# Patient Record
Sex: MALE | Race: WHITE | Employment: OTHER | URBAN - METROPOLITAN AREA
[De-identification: names, ages, dates, MRNs, and addresses within clinical notes are randomized per-mention and may not be internally consistent; named-entity substitution may affect disease eponyms.]

---

## 2017-01-02 RX ORDER — GLIMEPIRIDE 1 MG/1
TABLET ORAL
Qty: 90 TABLET | Refills: 1 | Status: SHIPPED | OUTPATIENT
Start: 2017-01-02 | End: 2017-07-01

## 2017-03-03 RX ORDER — ATORVASTATIN CALCIUM 40 MG/1
TABLET, FILM COATED ORAL
Qty: 30 TABLET | Refills: 0 | Status: SHIPPED | OUTPATIENT
Start: 2017-03-03 | End: 2017-04-06

## 2017-03-03 RX ORDER — LISINOPRIL 5 MG/1
TABLET ORAL
Qty: 30 TABLET | Refills: 0 | Status: SHIPPED | OUTPATIENT
Start: 2017-03-03 | End: 2017-04-06

## 2017-03-08 ENCOUNTER — TELEPHONE (OUTPATIENT)
Dept: INTERNAL MEDICINE CLINIC | Facility: CLINIC | Age: 77
End: 2017-03-08

## 2017-03-08 ENCOUNTER — OFFICE VISIT (OUTPATIENT)
Dept: INTERNAL MEDICINE CLINIC | Facility: CLINIC | Age: 77
End: 2017-03-08

## 2017-03-08 VITALS
OXYGEN SATURATION: 98 % | DIASTOLIC BLOOD PRESSURE: 80 MMHG | SYSTOLIC BLOOD PRESSURE: 124 MMHG | WEIGHT: 191.63 LBS | BODY MASS INDEX: 30.08 KG/M2 | HEART RATE: 100 BPM | TEMPERATURE: 97 F | HEIGHT: 67 IN

## 2017-03-08 DIAGNOSIS — N28.9 RENAL INSUFFICIENCY: ICD-10-CM

## 2017-03-08 DIAGNOSIS — IMO0001 UNCONTROLLED TYPE 2 DIABETES MELLITUS WITHOUT COMPLICATION, WITHOUT LONG-TERM CURRENT USE OF INSULIN: Chronic | ICD-10-CM

## 2017-03-08 DIAGNOSIS — R42 DIZZINESS: Primary | ICD-10-CM

## 2017-03-08 LAB
ALBUMIN SERPL BCP-MCNC: 4.3 G/DL (ref 3.5–4.8)
ALBUMIN/GLOB SERPL: 1.8 {RATIO} (ref 1–2)
ALP SERPL-CCNC: 75 U/L (ref 32–100)
ALT SERPL-CCNC: 40 U/L (ref 17–63)
ANION GAP SERPL CALC-SCNC: 10 MMOL/L (ref 0–18)
AST SERPL-CCNC: 28 U/L (ref 15–41)
BASOPHILS # BLD: 0 K/UL (ref 0–0.2)
BASOPHILS NFR BLD: 1 %
BILIRUB SERPL-MCNC: 0.5 MG/DL (ref 0.3–1.2)
BUN SERPL-MCNC: 21 MG/DL (ref 8–20)
BUN/CREAT SERPL: 15.4 (ref 10–20)
CALCIUM SERPL-MCNC: 9.6 MG/DL (ref 8.5–10.5)
CHLORIDE SERPL-SCNC: 106 MMOL/L (ref 95–110)
CO2 SERPL-SCNC: 26 MMOL/L (ref 22–32)
CREAT SERPL-MCNC: 1.36 MG/DL (ref 0.5–1.5)
EOSINOPHIL # BLD: 0.6 K/UL (ref 0–0.7)
EOSINOPHIL NFR BLD: 8 %
ERYTHROCYTE [DISTWIDTH] IN BLOOD BY AUTOMATED COUNT: 13.3 % (ref 11–15)
GLOBULIN PLAS-MCNC: 2.4 G/DL (ref 2.5–3.7)
GLUCOSE SERPL-MCNC: 141 MG/DL (ref 70–99)
HCT VFR BLD AUTO: 45.9 % (ref 41–52)
HGB BLD-MCNC: 15.2 G/DL (ref 13.5–17.5)
LYMPHOCYTES # BLD: 1.5 K/UL (ref 1–4)
LYMPHOCYTES NFR BLD: 22 %
MCH RBC QN AUTO: 31.6 PG (ref 27–32)
MCHC RBC AUTO-ENTMCNC: 33.2 G/DL (ref 32–37)
MCV RBC AUTO: 95.4 FL (ref 80–100)
MONOCYTES # BLD: 0.5 K/UL (ref 0–1)
MONOCYTES NFR BLD: 8 %
NEUTROPHILS # BLD AUTO: 4.1 K/UL (ref 1.8–7.7)
NEUTROPHILS NFR BLD: 61 %
OSMOLALITY UR CALC.SUM OF ELEC: 299 MOSM/KG (ref 275–295)
PLATELET # BLD AUTO: 295 K/UL (ref 140–400)
PMV BLD AUTO: 7.7 FL (ref 7.4–10.3)
POTASSIUM SERPL-SCNC: 4.6 MMOL/L (ref 3.3–5.1)
PROT SERPL-MCNC: 6.7 G/DL (ref 5.9–8.4)
RBC # BLD AUTO: 4.81 M/UL (ref 4.5–5.9)
SODIUM SERPL-SCNC: 142 MMOL/L (ref 136–144)
TSH SERPL-ACNC: 4.89 UIU/ML (ref 0.34–5.6)
VIT B12 SERPL-MCNC: 491 PG/ML (ref 181–914)
WBC # BLD AUTO: 6.8 K/UL (ref 4–11)

## 2017-03-08 PROCEDURE — 36415 COLL VENOUS BLD VENIPUNCTURE: CPT | Performed by: INTERNAL MEDICINE

## 2017-03-08 PROCEDURE — 99213 OFFICE O/P EST LOW 20 MIN: CPT | Performed by: INTERNAL MEDICINE

## 2017-03-08 PROCEDURE — G0463 HOSPITAL OUTPT CLINIC VISIT: HCPCS | Performed by: INTERNAL MEDICINE

## 2017-03-08 RX ORDER — DOXEPIN HYDROCHLORIDE 50 MG/1
1 CAPSULE ORAL DAILY
Status: ON HOLD | COMMUNITY
End: 2018-10-25

## 2017-03-08 NOTE — PROGRESS NOTES
HPI:    Patient ID: Wolfgang Sullivan is a 77year old male. HPI  Dizziness  Patient has had episodes of orthostatic lightheadedness in the morning. Later in the day he felt better. Now he feels just fine. Once he gets up and going he feels better.    C Comment:Other reaction(s): Unknown  Shellfish               Swelling    Comment:Other reaction(s): SHELLFISH DERIVED  Shellfish Allergy       Unknown    HISTORY:  Past Medical History   Diagnosis Date   • Prostate cancer ([facility] [address] 2001     S/P radical prostate

## 2017-03-08 NOTE — PATIENT INSTRUCTIONS
ASSESSMENT/PLAN:   Diagnoses and all orders for this visit:    Dizziness  Unclear cause for his dizziness.  Recommend lab testing, needs to get up slow in the morning and make sure he is drinking plenty of fluids  diabetes mellitus   Will check his A1C

## 2017-03-08 NOTE — TELEPHONE ENCOUNTER
Called and spoke to patient. Per patient for the past two days he has had a couple of episodes of feeling dizzy when he arises from a laying position into a sitting position.   Per patient, this morning he sat up from his bed to get up for the day and when

## 2017-03-09 LAB — HBA1C MFR BLD: 6.8 % (ref 4–6)

## 2017-03-10 NOTE — PROGRESS NOTES
Quick Note:    CBC Normal (white blood cells and red blood cells and platelets),   CMP Normal (electrolytes, and liver functions), significant decline in kidney function. No motrin, ibuprofen, advil, alleve, naprosyn with these medications.  No motrin, ibupr

## 2017-03-13 ENCOUNTER — OFFICE VISIT (OUTPATIENT)
Dept: INTERNAL MEDICINE CLINIC | Facility: CLINIC | Age: 77
End: 2017-03-13

## 2017-03-13 VITALS
OXYGEN SATURATION: 99 % | BODY MASS INDEX: 29.98 KG/M2 | WEIGHT: 191 LBS | DIASTOLIC BLOOD PRESSURE: 80 MMHG | SYSTOLIC BLOOD PRESSURE: 118 MMHG | TEMPERATURE: 98 F | HEIGHT: 67 IN | HEART RATE: 84 BPM | RESPIRATION RATE: 16 BRPM

## 2017-03-13 DIAGNOSIS — R42 DIZZINESS: ICD-10-CM

## 2017-03-13 DIAGNOSIS — I10 ESSENTIAL HYPERTENSION: Primary | ICD-10-CM

## 2017-03-13 DIAGNOSIS — N28.9 RENAL INSUFFICIENCY: ICD-10-CM

## 2017-03-13 LAB
ANION GAP SERPL CALC-SCNC: 8 MMOL/L (ref 0–18)
BACTERIA UR QL AUTO: NEGATIVE /HPF
BILIRUB UR QL: NEGATIVE
BUN SERPL-MCNC: 15 MG/DL (ref 8–20)
BUN/CREAT SERPL: 12.8 (ref 10–20)
CALCIUM SERPL-MCNC: 9.7 MG/DL (ref 8.5–10.5)
CHLORIDE SERPL-SCNC: 107 MMOL/L (ref 95–110)
CLARITY UR: CLEAR
CO2 SERPL-SCNC: 27 MMOL/L (ref 22–32)
COLOR UR: YELLOW
CREAT SERPL-MCNC: 1.17 MG/DL (ref 0.5–1.5)
ERYTHROCYTE [SEDIMENTATION RATE] IN BLOOD: 5 MM/HR (ref 0–20)
GLUCOSE SERPL-MCNC: 153 MG/DL (ref 70–99)
HGB UR QL STRIP.AUTO: NEGATIVE
HYALINE CASTS #/AREA URNS AUTO: 1 /LPF
LEUKOCYTE ESTERASE UR QL STRIP.AUTO: NEGATIVE
NITRITE UR QL STRIP.AUTO: NEGATIVE
OSMOLALITY UR CALC.SUM OF ELEC: 298 MOSM/KG (ref 275–295)
PH UR: 5 [PH] (ref 5–8)
POTASSIUM SERPL-SCNC: 5 MMOL/L (ref 3.3–5.1)
PROT UR-MCNC: NEGATIVE MG/DL
RBC #/AREA URNS AUTO: 0 /HPF
SODIUM SERPL-SCNC: 142 MMOL/L (ref 136–144)
SP GR UR STRIP: 1.02 (ref 1–1.03)
UROBILINOGEN UR STRIP-ACNC: <2
VIT C UR-MCNC: 40 MG/DL
WBC #/AREA URNS AUTO: <1 /HPF

## 2017-03-13 PROCEDURE — 99212 OFFICE O/P EST SF 10 MIN: CPT | Performed by: INTERNAL MEDICINE

## 2017-03-13 PROCEDURE — G0463 HOSPITAL OUTPT CLINIC VISIT: HCPCS | Performed by: INTERNAL MEDICINE

## 2017-03-13 PROCEDURE — 36415 COLL VENOUS BLD VENIPUNCTURE: CPT | Performed by: INTERNAL MEDICINE

## 2017-03-13 NOTE — PROGRESS NOTES
HPI:    Patient ID: Chikis Haynes is a 77year old male. HPI  Dizziness  Patient's symptoms have fully resolved. He has had no further dizziness. Drinking more fluids and needs his renal function rechecked. Hasn't been taking any NSAIDs.     Hypertamanda CONCENTRATE OR) Take by mouth. Disp:  Rfl:    Glucose Blood (ONETOUCH ULTRA BLUE) In Vitro Strip  Disp:  Rfl:    Vitamin C (VITAMIN C) 500 MG Oral Tab Take 1 tablet by mouth 2 (two) times daily.  1 daily Disp:  Rfl:    aspirin (ASPIR-81) 81 MG Oral Tab EC T

## 2017-03-15 LAB — ANA NUCLEOLAR TITR SER IF: 40 {TITER}

## 2017-03-16 ENCOUNTER — HOSPITAL ENCOUNTER (OUTPATIENT)
Dept: ULTRASOUND IMAGING | Age: 77
Discharge: HOME | End: 2017-03-16
Attending: INTERNAL MEDICINE
Payer: MEDICARE

## 2017-03-16 DIAGNOSIS — N28.9 RENAL INSUFFICIENCY: ICD-10-CM

## 2017-03-16 PROCEDURE — 76770 US EXAM ABDO BACK WALL COMP: CPT

## 2017-04-06 RX ORDER — LISINOPRIL 5 MG/1
TABLET ORAL
Qty: 30 TABLET | Refills: 0 | Status: SHIPPED | OUTPATIENT
Start: 2017-04-06 | End: 2017-05-04

## 2017-04-06 RX ORDER — ATORVASTATIN CALCIUM 40 MG/1
TABLET, FILM COATED ORAL
Qty: 30 TABLET | Refills: 0 | Status: SHIPPED | OUTPATIENT
Start: 2017-04-06 | End: 2017-05-04

## 2017-05-04 RX ORDER — ATORVASTATIN CALCIUM 40 MG/1
TABLET, FILM COATED ORAL
Qty: 30 TABLET | Refills: 3 | Status: SHIPPED | OUTPATIENT
Start: 2017-05-04 | End: 2017-09-08

## 2017-05-04 RX ORDER — LISINOPRIL 5 MG/1
TABLET ORAL
Qty: 30 TABLET | Refills: 3 | Status: SHIPPED | OUTPATIENT
Start: 2017-05-04 | End: 2017-09-01

## 2017-07-04 RX ORDER — GLIMEPIRIDE 1 MG/1
TABLET ORAL
Qty: 90 TABLET | Refills: 0 | Status: SHIPPED | OUTPATIENT
Start: 2017-07-04 | End: 2017-09-29

## 2017-07-13 ENCOUNTER — TELEPHONE (OUTPATIENT)
Dept: INTERNAL MEDICINE CLINIC | Facility: CLINIC | Age: 77
End: 2017-07-13

## 2017-07-13 RX ORDER — MONTELUKAST SODIUM 10 MG/1
10 TABLET ORAL DAILY
Qty: 7 TABLET | Refills: 0 | Status: SHIPPED | OUTPATIENT
Start: 2017-07-13 | End: 2017-07-20

## 2017-07-13 RX ORDER — AMOXICILLIN 500 MG/1
500 CAPSULE ORAL 3 TIMES DAILY
Qty: 30 CAPSULE | Refills: 0 | Status: SHIPPED | OUTPATIENT
Start: 2017-07-13 | End: 2017-07-23

## 2017-07-13 NOTE — TELEPHONE ENCOUNTER
Spoke with wife Leslee Canseco, informed prescription was sent to pharmacy and to make appointment if not feeling better. Wife verbalized understanding.

## 2017-07-13 NOTE — TELEPHONE ENCOUNTER
Try singuilar 10 mg at night. If no better, can he come in after 4 days. Rx. Sent.  See new Rx, sent to pharmacy please notify patientSee new Rx, f/u if not better

## 2017-09-01 RX ORDER — LISINOPRIL 5 MG/1
TABLET ORAL
Qty: 30 TABLET | Refills: 3 | Status: SHIPPED | OUTPATIENT
Start: 2017-09-01 | End: 2017-12-28

## 2017-09-08 RX ORDER — ATORVASTATIN CALCIUM 40 MG/1
TABLET, FILM COATED ORAL
Qty: 30 TABLET | Refills: 0 | Status: SHIPPED | OUTPATIENT
Start: 2017-09-08 | End: 2017-10-13

## 2017-09-29 RX ORDER — GLIMEPIRIDE 1 MG/1
TABLET ORAL
Qty: 90 TABLET | Refills: 0 | Status: SHIPPED | OUTPATIENT
Start: 2017-09-29 | End: 2017-12-28

## 2017-10-04 ENCOUNTER — OFFICE VISIT (OUTPATIENT)
Dept: INTERNAL MEDICINE CLINIC | Facility: CLINIC | Age: 77
End: 2017-10-04

## 2017-10-04 ENCOUNTER — MED REC SCAN ONLY (OUTPATIENT)
Dept: INTERNAL MEDICINE CLINIC | Facility: CLINIC | Age: 77
End: 2017-10-04

## 2017-10-04 VITALS
HEIGHT: 67 IN | DIASTOLIC BLOOD PRESSURE: 84 MMHG | SYSTOLIC BLOOD PRESSURE: 128 MMHG | BODY MASS INDEX: 29.19 KG/M2 | WEIGHT: 186 LBS | TEMPERATURE: 98 F | HEART RATE: 73 BPM

## 2017-10-04 DIAGNOSIS — IMO0001 UNCONTROLLED TYPE 2 DIABETES MELLITUS WITHOUT COMPLICATION, WITHOUT LONG-TERM CURRENT USE OF INSULIN: Primary | Chronic | ICD-10-CM

## 2017-10-04 DIAGNOSIS — Z12.5 SCREENING PSA (PROSTATE SPECIFIC ANTIGEN): ICD-10-CM

## 2017-10-04 DIAGNOSIS — Z85.46 PERSONAL HISTORY OF PROSTATE CANCER: ICD-10-CM

## 2017-10-04 DIAGNOSIS — I10 ESSENTIAL HYPERTENSION: ICD-10-CM

## 2017-10-04 PROCEDURE — 90653 IIV ADJUVANT VACCINE IM: CPT | Performed by: INTERNAL MEDICINE

## 2017-10-04 PROCEDURE — G0008 ADMIN INFLUENZA VIRUS VAC: HCPCS | Performed by: INTERNAL MEDICINE

## 2017-10-04 PROCEDURE — 36415 COLL VENOUS BLD VENIPUNCTURE: CPT | Performed by: INTERNAL MEDICINE

## 2017-10-04 PROCEDURE — 99213 OFFICE O/P EST LOW 20 MIN: CPT | Performed by: INTERNAL MEDICINE

## 2017-10-04 PROCEDURE — G0463 HOSPITAL OUTPT CLINIC VISIT: HCPCS | Performed by: INTERNAL MEDICINE

## 2017-10-04 NOTE — PATIENT INSTRUCTIONS
ASSESSMENT/PLAN:   Diagnoses and all orders for this visit:    Uncontrolled type 2 diabetes mellitus without complication, without long-term current use of insulin ([facility] [address]  Patient overall doing well, to recheck labs  Essential hypertension  BP overall doing

## 2017-10-04 NOTE — PROGRESS NOTES
HPI:    Patient ID: Wolfgang Sullivan is a 77year old male. HPI  Diabetes  Patient here for follow up of Diabetes. Has been taking medications regularly. Currently taking metformin/glimeperide  Checks sugars 1 times daily.   Fasting sugars average 3   multivitamin Oral Tab Take 1 tablet by mouth daily. Disp:  Rfl:    METFORMIN HCL 1000 MG Oral Tab TAKE ONE TABLET BY MOUTH TWICE DAILY Disp: 180 tablet Rfl: 1   Omega-3 Fatty Acids (FISH OIL CONCENTRATE OR) Take by mouth.  Disp:  Rfl:    Glucose Blood (

## 2017-10-13 RX ORDER — ATORVASTATIN CALCIUM 40 MG/1
TABLET, FILM COATED ORAL
Qty: 30 TABLET | Refills: 3 | Status: SHIPPED | OUTPATIENT
Start: 2017-10-13 | End: 2017-10-23

## 2017-10-20 NOTE — TELEPHONE ENCOUNTER
Diabetes Medications  Protocol Criteria:  · Appointment scheduled in the past 6 months or the next 3 months  · A1C < 7.5 in the past 6 months  · Creatinine in the past 12 months  · Creatinine result < 1.5   Recent Outpatient Visits            2 weeks ago U

## 2017-10-23 ENCOUNTER — TELEPHONE (OUTPATIENT)
Dept: INTERNAL MEDICINE CLINIC | Facility: CLINIC | Age: 77
End: 2017-10-23

## 2017-10-23 RX ORDER — ATORVASTATIN CALCIUM 40 MG/1
TABLET, FILM COATED ORAL
Qty: 90 TABLET | Refills: 1 | Status: SHIPPED | OUTPATIENT
Start: 2017-10-23 | End: 2018-10-26

## 2017-10-23 NOTE — TELEPHONE ENCOUNTER
Current Outpatient Prescriptions:     •  ATORVASTATIN 40 MG Oral Tab, TAKE ONE TABLET BY MOUTH ONCE DAILY, Disp: 30 tablet, Rfl: 3    Patient calling states [facility] never received rx from October 13th please send new rx patient is completely out of medi

## 2017-12-28 RX ORDER — GLIMEPIRIDE 1 MG/1
TABLET ORAL
Qty: 90 TABLET | Refills: 0 | Status: SHIPPED | OUTPATIENT
Start: 2017-12-28 | End: 2018-03-24

## 2017-12-28 RX ORDER — LISINOPRIL 5 MG/1
TABLET ORAL
Qty: 90 TABLET | Refills: 0 | Status: SHIPPED | OUTPATIENT
Start: 2017-12-28 | End: 2018-03-19

## 2018-01-12 ENCOUNTER — TELEPHONE (OUTPATIENT)
Dept: INTERNAL MEDICINE CLINIC | Facility: CLINIC | Age: 78
End: 2018-01-12

## 2018-01-19 ENCOUNTER — TELEPHONE (OUTPATIENT)
Dept: INTERNAL MEDICINE CLINIC | Facility: CLINIC | Age: 78
End: 2018-01-19

## 2018-01-19 NOTE — TELEPHONE ENCOUNTER
Patient states his pharmacy will not take the rx send for the One touch meter,lancets and strips .  They need a new rx

## 2018-01-27 ENCOUNTER — HOSPITAL ENCOUNTER (EMERGENCY)
Facility: HOSPITAL | Age: 78
Discharge: HOME | End: 2018-01-27
Attending: EMERGENCY MEDICINE
Payer: MEDICARE

## 2018-01-27 ENCOUNTER — APPOINTMENT (OUTPATIENT)
Dept: GENERAL RADIOLOGY | Facility: HOSPITAL | Age: 78
End: 2018-01-27
Payer: MEDICARE

## 2018-01-27 VITALS
HEART RATE: 77 BPM | OXYGEN SATURATION: 97 % | SYSTOLIC BLOOD PRESSURE: 136 MMHG | TEMPERATURE: 98 F | DIASTOLIC BLOOD PRESSURE: 66 MMHG | RESPIRATION RATE: 18 BRPM

## 2018-01-27 DIAGNOSIS — R09.1 PLEURISY: Primary | ICD-10-CM

## 2018-01-27 LAB
ANION GAP SERPL CALC-SCNC: 8 MMOL/L (ref 0–18)
BASOPHILS # BLD: 0 K/UL (ref 0–0.2)
BASOPHILS NFR BLD: 1 %
BUN SERPL-MCNC: 16 MG/DL (ref 8–20)
BUN/CREAT SERPL: 12.5 (ref 10–20)
CALCIUM SERPL-MCNC: 9.1 MG/DL (ref 8.5–10.5)
CHLORIDE SERPL-SCNC: 104 MMOL/L (ref 95–110)
CO2 SERPL-SCNC: 25 MMOL/L (ref 22–32)
CREAT SERPL-MCNC: 1.28 MG/DL (ref 0.5–1.5)
D DIMER PPP FEU-MCNC: <0.27 MCG/ML (ref ?–0.76)
EOSINOPHIL # BLD: 0.4 K/UL (ref 0–0.7)
EOSINOPHIL NFR BLD: 7 %
ERYTHROCYTE [DISTWIDTH] IN BLOOD BY AUTOMATED COUNT: 13.7 % (ref 11–15)
GLUCOSE SERPL-MCNC: 177 MG/DL (ref 70–99)
HCT VFR BLD AUTO: 43.6 % (ref 41–52)
HGB BLD-MCNC: 14.5 G/DL (ref 13.5–17.5)
LYMPHOCYTES # BLD: 1.3 K/UL (ref 1–4)
LYMPHOCYTES NFR BLD: 23 %
MCH RBC QN AUTO: 31.6 PG (ref 27–32)
MCHC RBC AUTO-ENTMCNC: 33.1 G/DL (ref 32–37)
MCV RBC AUTO: 95.3 FL (ref 80–100)
MONOCYTES # BLD: 0.5 K/UL (ref 0–1)
MONOCYTES NFR BLD: 8 %
NEUTROPHILS # BLD AUTO: 3.4 K/UL (ref 1.8–7.7)
NEUTROPHILS NFR BLD: 62 %
OSMOLALITY UR CALC.SUM OF ELEC: 290 MOSM/KG (ref 275–295)
PLATELET # BLD AUTO: 276 K/UL (ref 140–400)
PMV BLD AUTO: 7.2 FL (ref 7.4–10.3)
POTASSIUM SERPL-SCNC: 4.2 MMOL/L (ref 3.3–5.1)
RBC # BLD AUTO: 4.58 M/UL (ref 4.5–5.9)
SODIUM SERPL-SCNC: 137 MMOL/L (ref 136–144)
TROPONIN I SERPL-MCNC: 0 NG/ML (ref ?–0.03)
TROPONIN I SERPL-MCNC: 0 NG/ML (ref ?–0.03)
WBC # BLD AUTO: 5.6 K/UL (ref 4–11)

## 2018-01-27 PROCEDURE — 80048 BASIC METABOLIC PNL TOTAL CA: CPT

## 2018-01-27 PROCEDURE — 93005 ELECTROCARDIOGRAM TRACING: CPT

## 2018-01-27 PROCEDURE — 71045 X-RAY EXAM CHEST 1 VIEW: CPT

## 2018-01-27 PROCEDURE — 85025 COMPLETE CBC W/AUTO DIFF WBC: CPT | Performed by: EMERGENCY MEDICINE

## 2018-01-27 PROCEDURE — 93010 ELECTROCARDIOGRAM REPORT: CPT | Performed by: EMERGENCY MEDICINE

## 2018-01-27 PROCEDURE — 80048 BASIC METABOLIC PNL TOTAL CA: CPT | Performed by: EMERGENCY MEDICINE

## 2018-01-27 PROCEDURE — 99285 EMERGENCY DEPT VISIT HI MDM: CPT

## 2018-01-27 PROCEDURE — 84484 ASSAY OF TROPONIN QUANT: CPT

## 2018-01-27 PROCEDURE — 84484 ASSAY OF TROPONIN QUANT: CPT | Performed by: EMERGENCY MEDICINE

## 2018-01-27 PROCEDURE — 36415 COLL VENOUS BLD VENIPUNCTURE: CPT

## 2018-01-27 PROCEDURE — 85379 FIBRIN DEGRADATION QUANT: CPT | Performed by: EMERGENCY MEDICINE

## 2018-01-27 PROCEDURE — 85025 COMPLETE CBC W/AUTO DIFF WBC: CPT

## 2018-01-28 NOTE — ED PROVIDER NOTES
Patient Seen in: [facility] AND [facility] Emergency Department    History   Patient presents with:  Chest Pain Angina (cardiovascular)    Stated Complaint: CP    HPI    Patient is a 69-year-old male presents to the emergency department with a chief complaint of 97.7 °F (36.5 °C) (Oral)   Resp 19   SpO2 97%         Physical Exam   Constitutional: He is oriented to person, place, and time. He appears well-developed and well-nourished. HENT:   Head: Normocephalic and atraumatic.    Eyes: Conjunctivae and EOM are no noted on EKG Report. Rate: 62  Rhythm: Sinus Rhythm  Reading: No acute changes.   Rare PVC           ED Course as of Jan 27 1814  ------------------------------------------------------------       MDM   Troponin and d-dimer normal.  Chest x-ray normal.  No

## 2018-03-19 RX ORDER — LISINOPRIL 5 MG/1
TABLET ORAL
Qty: 90 TABLET | Refills: 0 | Status: SHIPPED | OUTPATIENT
Start: 2018-03-19 | End: 2018-06-29

## 2018-03-25 RX ORDER — GLIMEPIRIDE 1 MG/1
TABLET ORAL
Qty: 90 TABLET | Refills: 1 | Status: SHIPPED | OUTPATIENT
Start: 2018-03-25 | End: 2018-08-17

## 2018-04-04 ENCOUNTER — OFFICE VISIT (OUTPATIENT)
Dept: INTERNAL MEDICINE CLINIC | Facility: CLINIC | Age: 78
End: 2018-04-04

## 2018-04-04 VITALS
TEMPERATURE: 98 F | BODY MASS INDEX: 25.76 KG/M2 | WEIGHT: 170 LBS | DIASTOLIC BLOOD PRESSURE: 74 MMHG | SYSTOLIC BLOOD PRESSURE: 107 MMHG | HEART RATE: 73 BPM | HEIGHT: 68 IN | OXYGEN SATURATION: 98 %

## 2018-04-04 DIAGNOSIS — IMO0001 UNCONTROLLED TYPE 2 DIABETES MELLITUS WITHOUT COMPLICATION, WITHOUT LONG-TERM CURRENT USE OF INSULIN: Primary | Chronic | ICD-10-CM

## 2018-04-04 DIAGNOSIS — I10 ESSENTIAL HYPERTENSION: ICD-10-CM

## 2018-04-04 PROCEDURE — 99213 OFFICE O/P EST LOW 20 MIN: CPT | Performed by: INTERNAL MEDICINE

## 2018-04-04 PROCEDURE — 36415 COLL VENOUS BLD VENIPUNCTURE: CPT | Performed by: INTERNAL MEDICINE

## 2018-04-04 RX ORDER — BIOTIN 1 MG
1 TABLET ORAL DAILY
COMMUNITY
End: 2018-09-10

## 2018-04-04 NOTE — PROGRESS NOTES
HPI:    Patient ID: Jerson Shay is a 69year old male. HPI  Diabetes  Patient here for follow up of Diabetes. Has been taking medications regularly. Currently taking metformin/glimeperid  Checks sugars 1 times daily.   Fasting sugars average 9 Blood Glucose Monitoring Suppl ([address]   [address]) w/Device Does not apply Kit Testing twice a day    Diagnosis E11.65 Disp: 1 kit Rfl: 0   ONETOUCH LANCETS Does not apply Misc Testing twice a day   Diagnosis E11.65 Disp: 100 each Rfl: 3   Glucose Bl long-term current use of insulin (HCC)  Sugar overall doing very well, will check labs  Essential hypertension  BP quite good, likely related to his increased walking, keep it up!!           DB#[id number]

## 2018-04-15 NOTE — TELEPHONE ENCOUNTER
Refilled per Epic protocol.     Diabetes Medications  Protocol Criteria:  · Appointment scheduled in the past 6 months or the next 3 months  · A1C < 7.5 in the past 6 months  · Creatinine in the past 12 months  · Creatinine result < 1.5   Recent Outpatient

## 2018-05-14 ENCOUNTER — PATIENT OUTREACH (OUTPATIENT)
Dept: CASE MANAGEMENT | Age: 78
End: 2018-05-14

## 2018-05-14 NOTE — PROGRESS NOTES
Outreached to patient in regards to our Chronic Care Management program. Patient stated he would like to think about it before enrollment. I informed patient I will mail out letter and follow up in 2 weeks and patient agreed. Thank you.

## 2018-05-26 ENCOUNTER — APPOINTMENT (OUTPATIENT)
Dept: ULTRASOUND IMAGING | Facility: HOSPITAL | Age: 78
DRG: 694 | End: 2018-05-26
Attending: EMERGENCY MEDICINE
Payer: MEDICARE

## 2018-05-26 ENCOUNTER — HOSPITAL ENCOUNTER (EMERGENCY)
Facility: HOSPITAL | Age: 78
Discharge: HOME | DRG: 694 | End: 2018-05-26
Attending: EMERGENCY MEDICINE
Payer: MEDICARE

## 2018-05-26 ENCOUNTER — APPOINTMENT (OUTPATIENT)
Dept: CT IMAGING | Facility: HOSPITAL | Age: 78
DRG: 694 | End: 2018-05-26
Attending: EMERGENCY MEDICINE
Payer: MEDICARE

## 2018-05-26 VITALS
HEART RATE: 72 BPM | OXYGEN SATURATION: 100 % | HEIGHT: 68 IN | WEIGHT: 171 LBS | TEMPERATURE: 98 F | DIASTOLIC BLOOD PRESSURE: 71 MMHG | RESPIRATION RATE: 16 BRPM | BODY MASS INDEX: 25.91 KG/M2 | SYSTOLIC BLOOD PRESSURE: 139 MMHG

## 2018-05-26 DIAGNOSIS — R10.32 ABDOMINAL PAIN, LEFT LOWER QUADRANT: Primary | ICD-10-CM

## 2018-05-26 DIAGNOSIS — E86.0 DEHYDRATION: ICD-10-CM

## 2018-05-26 PROCEDURE — 96374 THER/PROPH/DIAG INJ IV PUSH: CPT

## 2018-05-26 PROCEDURE — 81003 URINALYSIS AUTO W/O SCOPE: CPT | Performed by: EMERGENCY MEDICINE

## 2018-05-26 PROCEDURE — 85025 COMPLETE CBC W/AUTO DIFF WBC: CPT | Performed by: EMERGENCY MEDICINE

## 2018-05-26 PROCEDURE — 96361 HYDRATE IV INFUSION ADD-ON: CPT

## 2018-05-26 PROCEDURE — 74176 CT ABD & PELVIS W/O CONTRAST: CPT | Performed by: EMERGENCY MEDICINE

## 2018-05-26 PROCEDURE — 99285 EMERGENCY DEPT VISIT HI MDM: CPT

## 2018-05-26 PROCEDURE — 93975 VASCULAR STUDY: CPT | Performed by: EMERGENCY MEDICINE

## 2018-05-26 PROCEDURE — 76870 US EXAM SCROTUM: CPT | Performed by: EMERGENCY MEDICINE

## 2018-05-26 PROCEDURE — 80048 BASIC METABOLIC PNL TOTAL CA: CPT | Performed by: EMERGENCY MEDICINE

## 2018-05-26 RX ORDER — MORPHINE SULFATE 4 MG/ML
INJECTION, SOLUTION INTRAMUSCULAR; INTRAVENOUS
Status: DISCONTINUED
Start: 2018-05-26 | End: 2018-05-26

## 2018-05-26 RX ORDER — MORPHINE SULFATE 4 MG/ML
4 INJECTION, SOLUTION INTRAMUSCULAR; INTRAVENOUS ONCE
Status: COMPLETED | OUTPATIENT
Start: 2018-05-26 | End: 2018-05-26

## 2018-05-26 NOTE — ED NOTES
Pt reports, \"last void 530am, rates lower left abd pain 2/10, bladder scan resulted 0ml\" Bladder non distended. Denies fever/chills.

## 2018-05-26 NOTE — ED INITIAL ASSESSMENT (HPI)
Lower abd pain with urinary retention + urgency since 0530, also reports recent left testicular swelling several days ago, now resolved, pt reports pain/soreness to site.

## 2018-05-26 NOTE — ED PROVIDER NOTES
Patient Seen in: [facility] AND [facility] Emergency Department    History   Patient presents with:  Abdomen/Flank Pain (GI/)    Stated Complaint: L Testicular Pain. Feels the need to urinate but can't.  Lower Left Abdominal Pa*    HPI    52-year-old male franky reviewed and negative except as noted above.     Physical Exam   ED Triage Vitals  BP: 131/80 [05/26/18 0705]  Pulse: 69 [05/26/18 0705]  Resp: 20 [05/26/18 0705]  Temp: 98.1 °F (36.7 °C) [05/26/18 0705]  Temp src: Oral [05/26/18 0705]  SpO2: 100 % [05/26/1 at the right lower pole. No solid or suspicious masses or hydronephrosis. 4. Uncomplicated diverticulosis.  Fat halo sign involving several loops of colon which may reflect obesity although findings also have been reported in cases of inflammatory bowel di within normal limits   BASIC METABOLIC PANEL (8) - Abnormal; Notable for the following:     Glucose 136 (*)     Calculated Osmolality 297 (*)     GFR, Non- 49 (*)     GFR, -American 57 (*)     All other components within normal limit basic health screening including reassessment of your blood pressure.     Medications Prescribed:  Discharge Medication List as of 5/26/2018 11:58 AM

## 2018-05-26 NOTE — ED NOTES
Pt back from [address]. Family at [facility].

## 2018-05-29 ENCOUNTER — APPOINTMENT (OUTPATIENT)
Dept: CT IMAGING | Facility: HOSPITAL | Age: 78
DRG: 694 | End: 2018-05-29
Attending: EMERGENCY MEDICINE
Payer: MEDICARE

## 2018-05-29 ENCOUNTER — OFFICE VISIT (OUTPATIENT)
Dept: INTERNAL MEDICINE CLINIC | Facility: CLINIC | Age: 78
End: 2018-05-29

## 2018-05-29 ENCOUNTER — HOSPITAL ENCOUNTER (INPATIENT)
Facility: HOSPITAL | Age: 78
LOS: 2 days | Discharge: HOME | DRG: 694 | End: 2018-05-31
Attending: EMERGENCY MEDICINE | Admitting: INTERNAL MEDICINE
Payer: MEDICARE

## 2018-05-29 VITALS
SYSTOLIC BLOOD PRESSURE: 153 MMHG | HEART RATE: 66 BPM | DIASTOLIC BLOOD PRESSURE: 84 MMHG | TEMPERATURE: 97 F | OXYGEN SATURATION: 99 %

## 2018-05-29 DIAGNOSIS — IMO0001 UNCONTROLLED TYPE 2 DIABETES MELLITUS WITHOUT COMPLICATION, WITHOUT LONG-TERM CURRENT USE OF INSULIN: Chronic | ICD-10-CM

## 2018-05-29 DIAGNOSIS — R10.32 ABDOMINAL PAIN, LLQ: Primary | ICD-10-CM

## 2018-05-29 DIAGNOSIS — D73.5 SPLENIC INFARCT: ICD-10-CM

## 2018-05-29 DIAGNOSIS — N50.812 TESTICULAR PAIN, LEFT: ICD-10-CM

## 2018-05-29 DIAGNOSIS — IMO0002 NONSPECIFIC ELEVATION OF LEVEL OF TRANSAMINASE OR LACTIC ACID DEHYDROGENASE (LDH): ICD-10-CM

## 2018-05-29 DIAGNOSIS — R10.32 ABDOMINAL PAIN, LEFT LOWER QUADRANT: Primary | ICD-10-CM

## 2018-05-29 PROCEDURE — G0463 HOSPITAL OUTPT CLINIC VISIT: HCPCS | Performed by: INTERNAL MEDICINE

## 2018-05-29 PROCEDURE — 74177 CT ABD & PELVIS W/CONTRAST: CPT | Performed by: EMERGENCY MEDICINE

## 2018-05-29 PROCEDURE — 99214 OFFICE O/P EST MOD 30 MIN: CPT | Performed by: INTERNAL MEDICINE

## 2018-05-29 RX ORDER — DIPHENHYDRAMINE HYDROCHLORIDE 50 MG/ML
25 INJECTION INTRAMUSCULAR; INTRAVENOUS ONCE
Status: COMPLETED | OUTPATIENT
Start: 2018-05-29 | End: 2018-05-29

## 2018-05-29 RX ORDER — METHYLPREDNISOLONE SODIUM SUCCINATE 125 MG/2ML
80 INJECTION, POWDER, LYOPHILIZED, FOR SOLUTION INTRAMUSCULAR; INTRAVENOUS ONCE
Status: COMPLETED | OUTPATIENT
Start: 2018-05-29 | End: 2018-05-29

## 2018-05-29 RX ORDER — MORPHINE SULFATE 4 MG/ML
2 INJECTION, SOLUTION INTRAMUSCULAR; INTRAVENOUS EVERY 4 HOURS PRN
Status: DISCONTINUED | OUTPATIENT
Start: 2018-05-29 | End: 2018-05-31

## 2018-05-29 RX ORDER — MULTIVIT WITH MINERALS/LUTEIN
500 TABLET ORAL DAILY
Status: ON HOLD | COMMUNITY
End: 2018-10-25

## 2018-05-29 RX ORDER — LISINOPRIL 5 MG/1
5 TABLET ORAL DAILY
Status: DISCONTINUED | OUTPATIENT
Start: 2018-05-30 | End: 2018-05-30

## 2018-05-29 RX ORDER — ASCORBIC ACID 500 MG
500 TABLET ORAL DAILY
Status: DISCONTINUED | OUTPATIENT
Start: 2018-05-30 | End: 2018-05-31

## 2018-05-29 RX ORDER — ATORVASTATIN CALCIUM 40 MG/1
40 TABLET, FILM COATED ORAL NIGHTLY
Status: DISCONTINUED | OUTPATIENT
Start: 2018-05-29 | End: 2018-05-31

## 2018-05-29 RX ORDER — MULTIVITAMIN WITH IRON
300 TABLET ORAL DAILY
Status: DISCONTINUED | OUTPATIENT
Start: 2018-05-29 | End: 2018-05-29 | Stop reason: RX

## 2018-05-29 RX ORDER — HYDROMORPHONE HYDROCHLORIDE 1 MG/ML
0.5 INJECTION, SOLUTION INTRAMUSCULAR; INTRAVENOUS; SUBCUTANEOUS EVERY 30 MIN PRN
Status: ACTIVE | OUTPATIENT
Start: 2018-05-29 | End: 2018-05-29

## 2018-05-29 RX ORDER — ONDANSETRON 2 MG/ML
4 INJECTION INTRAMUSCULAR; INTRAVENOUS EVERY 4 HOURS PRN
Status: DISCONTINUED | OUTPATIENT
Start: 2018-05-29 | End: 2018-05-31

## 2018-05-29 RX ORDER — DEXTROSE MONOHYDRATE 25 G/50ML
50 INJECTION, SOLUTION INTRAVENOUS AS NEEDED
Status: DISCONTINUED | OUTPATIENT
Start: 2018-05-29 | End: 2018-05-31

## 2018-05-29 RX ORDER — MORPHINE SULFATE 4 MG/ML
INJECTION, SOLUTION INTRAMUSCULAR; INTRAVENOUS
Status: COMPLETED
Start: 2018-05-29 | End: 2018-05-29

## 2018-05-29 RX ORDER — HYDROCODONE BITARTRATE AND ACETAMINOPHEN 5; 325 MG/1; MG/1
1 TABLET ORAL EVERY 6 HOURS PRN
Status: DISCONTINUED | OUTPATIENT
Start: 2018-05-29 | End: 2018-05-31

## 2018-05-29 RX ORDER — MORPHINE SULFATE 4 MG/ML
2 INJECTION, SOLUTION INTRAMUSCULAR; INTRAVENOUS ONCE
Status: COMPLETED | OUTPATIENT
Start: 2018-05-29 | End: 2018-05-29

## 2018-05-29 RX ORDER — FERROUS GLUCONATE 324(37.5)
324 TABLET ORAL 2 TIMES DAILY
Status: DISCONTINUED | OUTPATIENT
Start: 2018-05-29 | End: 2018-05-31

## 2018-05-29 RX ORDER — MORPHINE SULFATE 4 MG/ML
4 INJECTION, SOLUTION INTRAMUSCULAR; INTRAVENOUS ONCE
Status: COMPLETED | OUTPATIENT
Start: 2018-05-29 | End: 2018-05-29

## 2018-05-29 RX ORDER — ONDANSETRON 2 MG/ML
4 INJECTION INTRAMUSCULAR; INTRAVENOUS EVERY 6 HOURS PRN
Status: DISCONTINUED | OUTPATIENT
Start: 2018-05-29 | End: 2018-05-31

## 2018-05-29 RX ORDER — ASPIRIN 81 MG/1
81 TABLET ORAL EVERY MORNING
Status: DISCONTINUED | OUTPATIENT
Start: 2018-05-30 | End: 2018-05-31

## 2018-05-29 RX ORDER — DOXEPIN HYDROCHLORIDE 50 MG/1
1 CAPSULE ORAL DAILY
Status: DISCONTINUED | OUTPATIENT
Start: 2018-05-30 | End: 2018-05-31

## 2018-05-29 NOTE — PROGRESS NOTES
HPI:    Patient ID: Cate Sidhu is a 69year old male.     HPI  Patient comes in today with complaint of left lower quadrant abdominal pain shooting down and pain and discomfort to the left testicle patient was in  with same symptoms he had CAT Glucose Monitoring Suppl ([address]   [address]) w/Device Does not apply Kit Testing twice a day    Diagnosis E11.65 Disp: 1 kit Rfl: 0   ONETOUCH LANCETS Does not apply Misc Testing twice a day   Diagnosis E11.65 Disp: 100 each Rfl: 3   Glucose Blood (ONE OTHER SURGICAL HISTORY      Comment: radical prostatectomy   Family History   Problem Relation Age of Onset   • Heart Attack Father      CAD   • Cancer Father      lung   • Cancer Sister      colon      Social History: Smoking status: Former Smoker

## 2018-05-29 NOTE — ED NOTES
Pt care discussed with Dr. Millan. Pt seen here on 5/26 and had a full work up done. Per MD, waiting for orders until MD mcfarland completed.

## 2018-05-29 NOTE — ED PROVIDER NOTES
Patient Seen in: [facility] AND [facility] Emergency Department    History   Patient presents with:  Abdomen/Flank Pain (GI/)    Stated Complaint: LLQ abdominal pain with bilateral testicle swelling since Saturday    HPI    69year old male with past medical h radical prostatectomy        Smoking status: Former Smoker                                                              Packs/day: 1.00      Years: 0.00         Types: Cigarettes     Quit date: 1/1/1998  Smokeless tobacco: Never Used                      A testis shows no mass and no tenderness. Circumcised. Genitourinary Comments: Pt states testicular appearance at baseline with known chronic hydroceles. Black stool, heme negative. Musculoskeletal: Normal range of motion.  He exhibits no edema or tendern result                 Please view results for these tests on the individual orders.    LACTIC ACID RFLX DO NOT CANCEL   RAINBOW DRAW LAVENDER   RAINBOW DRAW DARK GREEN   RAINBOW DRAW GOLD       ED Course as of May 29 1549  --------------------------------- with results and pt will be admitted. Disposition and Plan     Clinical Impression:  Abdominal pain, left lower quadrant  (primary encounter diagnosis)    Disposition:  There is no disposition on file for this visit.   There is no disposition time on marlene

## 2018-05-29 NOTE — IMAGING NOTE
Ok to inject per Dr. Aneudy Dunn. Pt only has a shrimp allergy. Patient has never been injected by iodine.

## 2018-05-29 NOTE — PATIENT INSTRUCTIONS
ASSESSMENT/PLAN:   Abdominal pain, llq  (primary encounter diagnosis) patient with guarding and rebound tenderness in left lower quadrant sounds like acute abdomen belly soft will reevaluate in ER probably admitted and have further GI and surgery see him

## 2018-05-29 NOTE — ED INITIAL ASSESSMENT (HPI)
Pt sent to ED by pmd for eval of LLQ and swelling to bilateral testicles with pain to the left side. Voiding per normal but urine tests \"showed some blood\". Reports having diarrhea yesterday.

## 2018-05-29 NOTE — PROGRESS NOTES
Outreached to patient in regards to letter mailed for enrollment to Chronic Care Management program. Patient stated that he would like to think about it and I asked patient if I can call him later in the week and patient agreed. Thank you.

## 2018-05-30 ENCOUNTER — APPOINTMENT (OUTPATIENT)
Dept: ULTRASOUND IMAGING | Facility: HOSPITAL | Age: 78
DRG: 694 | End: 2018-05-30
Attending: INTERNAL MEDICINE
Payer: MEDICARE

## 2018-05-30 ENCOUNTER — APPOINTMENT (OUTPATIENT)
Dept: CV DIAGNOSTICS | Facility: HOSPITAL | Age: 78
DRG: 694 | End: 2018-05-30
Attending: INTERNAL MEDICINE
Payer: MEDICARE

## 2018-05-30 PROCEDURE — 76770 US EXAM ABDO BACK WALL COMP: CPT | Performed by: INTERNAL MEDICINE

## 2018-05-30 PROCEDURE — 99221 1ST HOSP IP/OBS SF/LOW 40: CPT | Performed by: SURGERY

## 2018-05-30 PROCEDURE — 99222 1ST HOSP IP/OBS MODERATE 55: CPT | Performed by: UROLOGY

## 2018-05-30 PROCEDURE — 99223 1ST HOSP IP/OBS HIGH 75: CPT | Performed by: INTERNAL MEDICINE

## 2018-05-30 PROCEDURE — 93306 TTE W/DOPPLER COMPLETE: CPT | Performed by: INTERNAL MEDICINE

## 2018-05-30 PROCEDURE — 99222 1ST HOSP IP/OBS MODERATE 55: CPT | Performed by: INTERNAL MEDICINE

## 2018-05-30 RX ORDER — SODIUM CHLORIDE 9 MG/ML
INJECTION, SOLUTION INTRAVENOUS CONTINUOUS
Status: DISCONTINUED | OUTPATIENT
Start: 2018-05-30 | End: 2018-05-31

## 2018-05-30 RX ORDER — ENOXAPARIN SODIUM 100 MG/ML
40 INJECTION SUBCUTANEOUS DAILY
Status: DISCONTINUED | OUTPATIENT
Start: 2018-05-30 | End: 2018-05-31

## 2018-05-30 NOTE — CONSULTS
Hematology Consultation Note    Patient Name: Freida Ty   YOB: 1940   Medical Record Number: [id number]   CSN: [id number]   Consulting Physician: Lei Biggs MD  Referring Physician(s): Anne Sultana MD  Date of Consultation II or unspecified type diabetes mellitus without mention of complication, not stated as uncontrolled        Past Surgical History:  Past Surgical History:  No date: APPENDECTOMY  No date: CHEST X-RAY 1 VW      Comment: chest xray negative  No date: Samuel Catherine weakness. Neurological: Negative for headaches, dizziness, speech problems, gait problems and weakness. A comprehensive 14 point review of systems was completed. Pertinent positives and negatives noted in the the HPI.      Vital Signs:  /78 (BP L for prostate cancer in 2001 status post radical prostatectomy with noted undetectable PSA in October, 2017, bilateral hydroceles, & mild hypogammaglobulinemia admitted with LLQ abdominal pain found to have evidence of mild splenic infarct.     Plan:    1.)

## 2018-05-30 NOTE — PROGRESS NOTES
Pharmacy Note: Dietary Supplement Discontinuation Per Policy    FISH OIL CONCENTRATE CAPS 300 mg has been discontinued on Claven Dasen per policy. This supplement may be restarted upon discharge using the medication reconciliation process.     Thank stefanie

## 2018-05-30 NOTE — PROGRESS NOTES
[facility] HOSP - [facility]    GI Progress Note      Kat Rodriguez Patient Status:  Inpatient    1940 MRN [id number]   Location [facility] 4W/SW/SE Attending Bandar Schwab MD   Hosp Day # 1 PCP Jennifer Campo.  Martínez Moore MD          [address] [place] [address] approximately 10% of the splenic parenchyma. 3. Equivocal wall thickening involving the hepatic flexure of the colon, which may reflect low-grade infectious/inflammatory colitis. No definite CT manifestations of ischemic bowel.  No evidence of bowel obstruc

## 2018-05-30 NOTE — CONSULTS
[facility]    Report of Consultation    Kenneth London Patient Status:  Inpatient    1940 MRN [id number]   Location [facility] 4W/SW/SE Attending Rajesh Bustamante MD   Hosp Day # 1 PCP Mu España.  Malia Mora MD     Date of Medications:    Current Facility-Administered Medications:  0.9%  NaCl infusion  Intravenous Continuous   Enoxaparin Sodium (LOVENOX) 40 MG/0.4ML injection 40 mg 40 mg Subcutaneous Daily   ondansetron HCl (ZOFRAN) injection 4 mg 4 mg Intravenous Q4H PRN strips. Use to test blood glucose twice daily   atorvastatin 40 MG Oral Tab TAKE ONE TABLET BY MOUTH ONCE DAILY   multivitamin Oral Tab Take 1 tablet by mouth daily. Omega-3 Fatty Acids (FISH OIL CONCENTRATE OR) Take 300 mg by mouth.      Glucose Blood ( has a normal mood and affect.  His speech is normal and behavior is normal.       Results:     Laboratory Data:    Lab Results  Component Value Date   WBC 9.5 05/30/2018   HGB 13.2 (L) 05/30/2018   HCT 39.0 (L) 05/30/2018    05/30/2018   CREATSERUM 1 structures. 9. Moderate left greater than right hydroceles, partially imaged. 10. Lesser incidental findings as above.      Dictated by (CST): Ruben aHwk MD on 5/29/2018 at 15:52     Approved by (CST): Ruben Hawk MD on 5/29/2018 at 16:07

## 2018-05-30 NOTE — CONSULTS
[facility]    Report of Consultation    Chikis San Martin Patient Status:  Inpatient    1940 MRN [id number]   Location [facility] 4W/SW/SE Attending Jose Snowden MD   Hosp Day # 0 PCP Jerrod Barrios.  Sherrie Neumann MD     Reason for C History:  No date: APPENDECTOMY  No date: CHEST X-RAY 1 VW      Comment: chest xray negative  No date: CHOLECYSTECTOMY  2005: INGUINAL HERNIA REPAIR Right  2001: OTHER SURGICAL HISTORY      Comment: radical prostatectomy  Family History   Problem Relation sounds active all four quadrants,     no masses, no organomegaly   Genitalia:    Normal male without lesion, discharge or tenderness, bilateral hydrocele       Extremities:   Extremities normal, atraumatic, no cyanosis or edema   Pulses:   2+ and symmetric which may reflect chronic inflammation, obesity, or steroid use amongst other etiologies. 5. Colonic diverticulosis. 6. Large hiatal hernia. 7. Post cholecystectomy. 8. Post prostatectomy.  Surgical clips in the pelvis limiting assessment of the pelvic stru significance. Plan:  1. Continue zosyn  2. Renal US  3.  Urology consult    Juan Diego Palafox MD  5/29/2018  8:23 PM

## 2018-05-30 NOTE — CM/SW NOTE
05/30/18 1600   CM/SW Referral Data   Referral Source    Reason for Referral Discharge planning   Informant Patient   Pertinent Medical Hx   Primary Care Physician Name (Dr. Angela Waldron)   Patient Info   Patient's Mental Status Alert;Oriented

## 2018-05-30 NOTE — CONSULTS
[facility]    Report of Consultation    Wolfgang Sullivan Patient Status:  Inpatient    1940 MRN [id number]   Location [facility] 4W/SW/SE Attending Butch Posey MD   Hosp Day # 1 PCP Aris Bonilla.  Ashely Muller MD     Date of APPENDECTOMY  No date: CHEST X-RAY 1 VW      Comment: chest xray negative  No date: CHOLECYSTECTOMY  2005: INGUINAL HERNIA REPAIR Right  2001: OTHER SURGICAL HISTORY      Comment: radical prostatectomy    Family History  Family History   Problem Relation A TWICE DAILY   GLIMEPIRIDE 1 MG Oral Tab TAKE ONE TABLET BY MOUTH ONCE DAILY WITH BREAKFAST   LISINOPRIL 5 MG Oral Tab TAKE ONE TABLET BY MOUTH ONCE DAILY   Blood Glucose Monitoring Suppl ([address]   [address]) w/Device Does not apply Kit Testing twice a d Results  Component Value Date   WBC 9.5 05/30/2018   HGB 13.2 (L) 05/30/2018   HCT 39.0 (L) 05/30/2018    05/30/2018   CREATSERUM 1.75 (H) 05/30/2018   BUN 22 (H) 05/30/2018    (L) 05/30/2018   K 5.0 05/30/2018    05/30/2018   CO2 24 05/ lower quadrant  Continue Zosyn pending the results of his urine culture which was sent as part of a urinalysis with reflex culture yesterday. His pain appears to be resolved at the present time.   He could have passed a small stone before CT scan at either

## 2018-05-30 NOTE — H&P
[facility]    History and Physical    Felicitas Astorga Patient Status:  Inpatient    1940 MRN [id number]   Location [facility] 4W/SW/SE Attending Gonzalo Stallings MD   Hosp Day # 1 PCP Bianka Murry.  Melquiades Pennington MD     Date:   • Type II or unspecified type diabetes mellitus without mention of complication, not stated as uncontrolled      Past Surgical History:  No date: APPENDECTOMY  No date: CHEST X-RAY 1 VW      Comment: chest xray negative  No date: CHOLECYSTECTOMY  2005: mouth.     Glucose Blood (ONETOUCH ULTRA BLUE) In Vitro Strip    aspirin (ASPIR-81) 81 MG Oral Tab EC Take 81 mg by mouth every morning.  take 1 tablet (81MG)  by ORAL route  every day   Ferrous Gluconate 325 MG Oral Tab Take 1 tablet by mouth 2 (two) times LLQ tenderness). Genitourinary:   Genitourinary Comments: No tenderness    Lymphadenopathy:     He has no cervical adenopathy. Neurological: He is alert and oriented to person, place, and time. Skin: Skin is warm and dry.    Psychiatric: He has a norm etiologies. 5. Colonic diverticulosis. 6. Large hiatal hernia. 7. Post cholecystectomy. 8. Post prostatectomy. Surgical clips in the pelvis limiting assessment of the pelvic structures. 9. Moderate left greater than right hydroceles, partially imaged.  10. will reasonably be expected to span two midnight's based on the clinical documentation in H+P. Based on patients current state of illness, I anticipate that, after discharge, patient will require TBD.         Seven Null MD  5/30/2018

## 2018-05-31 VITALS
SYSTOLIC BLOOD PRESSURE: 122 MMHG | RESPIRATION RATE: 20 BRPM | HEIGHT: 68 IN | TEMPERATURE: 98 F | HEART RATE: 78 BPM | DIASTOLIC BLOOD PRESSURE: 68 MMHG | WEIGHT: 175.5 LBS | OXYGEN SATURATION: 96 % | BODY MASS INDEX: 26.6 KG/M2

## 2018-05-31 PROCEDURE — 99238 HOSP IP/OBS DSCHRG MGMT 30/<: CPT | Performed by: INTERNAL MEDICINE

## 2018-05-31 RX ORDER — CEFADROXIL 500 MG/1
500 CAPSULE ORAL 2 TIMES DAILY
Qty: 10 CAPSULE | Refills: 0 | Status: SHIPPED | OUTPATIENT
Start: 2018-05-31 | End: 2018-06-05

## 2018-05-31 RX ORDER — PANTOPRAZOLE SODIUM 40 MG/1
40 TABLET, DELAYED RELEASE ORAL
Status: DISCONTINUED | OUTPATIENT
Start: 2018-05-31 | End: 2018-05-31

## 2018-05-31 RX ORDER — PANTOPRAZOLE SODIUM 40 MG/1
40 TABLET, DELAYED RELEASE ORAL
Qty: 30 TABLET | Refills: 0 | Status: SHIPPED | OUTPATIENT
Start: 2018-06-01 | End: 2018-09-04

## 2018-05-31 NOTE — PROGRESS NOTES
[facility]    GI Progress Note      Binmaggie Fitch Patient Status:  Inpatient    1940 MRN [id number]   Location [facility] 4W/SW/SE Attending Eugenio Ca MD   Hosp Day # 2 PCP Will Cárdenas.  Richi Crews MD          [address] [place] [address] Normal echotexture. No hydronephrosis, mass, calculus or perirenal fluid. No hydronephrosis is seen. BLADDER:        Normal.  No visible wall thickening, mass, or calculus. Jets could not be identified after 5 minutes of observation.      CONCLUSION:

## 2018-06-01 ENCOUNTER — PATIENT OUTREACH (OUTPATIENT)
Dept: CASE MANAGEMENT | Age: 78
End: 2018-06-01

## 2018-06-01 NOTE — PROGRESS NOTES
Mónica Ped [phone] for post hospital follow up, [facility] contact information provided. TCM book by date 6-.

## 2018-06-01 NOTE — DISCHARGE SUMMARY
DISCHARGE SUMMARY     Jennie Reece Patient Status:  Inpatient    1940 MRN [id number]   Location [facility] 4W/SW/SE Attending No att. providers found   [facility] Day # 2 PCP Janiya Joe MD     Date of Admission: 2018  Date of Dis auscultation bilaterally  Cardiovascular: S1, S2 normal  Abdominal: soft, non-tender; bowel sounds normal; no masses,  no organomegaly  Extremities: extremities normal, atraumatic, no cyanosis or edema  Pulses: 2+ and symmetric    Procedures during hospita known as:  AMARYL      TAKE ONE TABLET BY MOUTH ONCE DAILY WITH BREAKFAST   Quantity:  90 tablet  Refills:  1     lisinopril 5 MG Tabs  Commonly known as:  PRINIVIL,ZESTRIL      TAKE ONE TABLET BY MOUTH ONCE DAILY   Quantity:  90 tablet  Refills:  0     Me R-1    LISINOPRIL 5 MG Oral Tab  TAKE ONE TABLET BY MOUTH ONCE DAILY, Normal, Disp-90 tablet, R-0    Blood Glucose Monitoring Suppl ([address]   [address]) w/Device Does not apply Kit  Testing twice a day    Diagnosis E11.65, Normal, Disp-1 kit, R-0    ONE Nadia Menon MD 6/1/2018    Time spent:  30 to 74 minutes

## 2018-06-04 NOTE — PROGRESS NOTES
Mónica Montana [phone] for post hospital follow up, NCM contact information provided.  TCM book by date 6-

## 2018-06-06 ENCOUNTER — OFFICE VISIT (OUTPATIENT)
Dept: INTERNAL MEDICINE CLINIC | Facility: CLINIC | Age: 78
End: 2018-06-06

## 2018-06-06 ENCOUNTER — TELEPHONE (OUTPATIENT)
Dept: HEMATOLOGY/ONCOLOGY | Facility: HOSPITAL | Age: 78
End: 2018-06-06

## 2018-06-06 ENCOUNTER — HOSPITAL ENCOUNTER (OUTPATIENT)
Dept: ULTRASOUND IMAGING | Age: 78
Discharge: HOME | End: 2018-06-06
Attending: INTERNAL MEDICINE | Admitting: INTERNAL MEDICINE
Payer: MEDICARE

## 2018-06-06 VITALS
HEIGHT: 68 IN | DIASTOLIC BLOOD PRESSURE: 70 MMHG | HEART RATE: 65 BPM | SYSTOLIC BLOOD PRESSURE: 136 MMHG | BODY MASS INDEX: 27.43 KG/M2 | WEIGHT: 181 LBS | OXYGEN SATURATION: 98 % | TEMPERATURE: 98 F

## 2018-06-06 DIAGNOSIS — R10.32 ABDOMINAL PAIN, LEFT LOWER QUADRANT: ICD-10-CM

## 2018-06-06 DIAGNOSIS — D73.5 SPLENIC INFARCT: ICD-10-CM

## 2018-06-06 DIAGNOSIS — R10.32 ABDOMINAL PAIN, LEFT LOWER QUADRANT: Primary | ICD-10-CM

## 2018-06-06 PROCEDURE — 99495 TRANSJ CARE MGMT MOD F2F 14D: CPT | Performed by: INTERNAL MEDICINE

## 2018-06-06 PROCEDURE — 81003 URINALYSIS AUTO W/O SCOPE: CPT | Performed by: INTERNAL MEDICINE

## 2018-06-06 PROCEDURE — 1111F DSCHRG MED/CURRENT MED MERGE: CPT | Performed by: INTERNAL MEDICINE

## 2018-06-06 PROCEDURE — 76775 US EXAM ABDO BACK WALL LIM: CPT | Performed by: INTERNAL MEDICINE

## 2018-06-06 RX ORDER — TRAMADOL HYDROCHLORIDE 50 MG/1
50 TABLET ORAL EVERY 6 HOURS PRN
Qty: 30 TABLET | Refills: 1 | Status: SHIPPED | OUTPATIENT
Start: 2018-06-06 | End: 2018-07-10 | Stop reason: ALTCHOICE

## 2018-06-06 NOTE — PROGRESS NOTES
Several attempts made to reach the patient with no return call. Patient completed HFU on 6/6/2018. Closing encounter.

## 2018-06-06 NOTE — ASSESSMENT & PLAN NOTE
Pain had resolved and it was presumed to be passed renal stone (but we never got the stone). Patient now with recurrent symptoms.  Spoke with Dr Rajwinder Whittaker, will repeat US of his kidneys today and f/u with him in the am. Should take tylenol for pain, will give

## 2018-06-06 NOTE — PROGRESS NOTES
HPI:    Sanaz Tom is a 69year old male here today for hospital follow up.    He was discharged from Inpatient hospital[facility]  to [address] [place] [address] Date: 5/29/18  Discharge Date: 5/31/18  Hospital Discharge Diagnosis:    abd pain LLQ, hyd Tab TAKE ONE TABLET BY MOUTH ONCE DAILY WITH BREAKFAST   LISINOPRIL 5 MG Oral Tab TAKE ONE TABLET BY MOUTH ONCE DAILY   Blood Glucose Monitoring Suppl ([address]   [address]) w/Device Does not apply Kit Testing twice a day    Diagnosis E11.65   ONETOUCH LA ago. His smoking use included Cigarettes. He smoked 1.00 pack per day. He has never used smokeless tobacco. He reports that he does not drink alcohol or use drugs. ROS:   Review of Systems   Respiratory: Negative for cough and shortness of breath.     C Diagnoses and/or Management Options: moderate  · Amount and/or Complexity of Data to Be Reviewed: moderate  · Risk of Significant Complications, Morbidity, and/or Mortality: moderate    Overall Risk:   moderate    Patient seen within 7 days from [facility]

## 2018-06-07 ENCOUNTER — OFFICE VISIT (OUTPATIENT)
Dept: SURGERY | Facility: CLINIC | Age: 78
End: 2018-06-07

## 2018-06-07 ENCOUNTER — APPOINTMENT (OUTPATIENT)
Dept: LAB | Facility: HOSPITAL | Age: 78
End: 2018-06-07
Attending: UROLOGY
Payer: MEDICARE

## 2018-06-07 ENCOUNTER — TELEPHONE (OUTPATIENT)
Dept: SURGERY | Facility: CLINIC | Age: 78
End: 2018-06-07

## 2018-06-07 VITALS
DIASTOLIC BLOOD PRESSURE: 83 MMHG | TEMPERATURE: 98 F | SYSTOLIC BLOOD PRESSURE: 122 MMHG | BODY MASS INDEX: 27.43 KG/M2 | HEART RATE: 87 BPM | HEIGHT: 68 IN | RESPIRATION RATE: 16 BRPM | WEIGHT: 181 LBS

## 2018-06-07 DIAGNOSIS — R10.9 LEFT FLANK PAIN: Primary | ICD-10-CM

## 2018-06-07 DIAGNOSIS — N13.30 HYDRONEPHROSIS OF LEFT KIDNEY: ICD-10-CM

## 2018-06-07 PROCEDURE — G0463 HOSPITAL OUTPT CLINIC VISIT: HCPCS | Performed by: UROLOGY

## 2018-06-07 PROCEDURE — 99213 OFFICE O/P EST LOW 20 MIN: CPT | Performed by: UROLOGY

## 2018-06-07 PROCEDURE — 81003 URINALYSIS AUTO W/O SCOPE: CPT

## 2018-06-07 NOTE — TELEPHONE ENCOUNTER
Dr. Marianne Dang asked me to call pt and make him an appointment for today, I called pt and offered 4:00pm opening and pt was very happy. Instructed pt to come to the [facility] OF THE [address] in the [facility] pt is aware and agrees. Pt placed on schedule.

## 2018-06-07 NOTE — PROGRESS NOTES
Freida Ty is a 69year old male.     HPI:   Patient presents with:  Testicular Swelling: left testicle pain and swelling   Abdominal Pain: left lower abd pain travels to Left groin       69-year-old male who I saw in consultation May 29, 2018 for l History: Smoking status: Former Smoker                                                              Packs/day: 1.00      Years: 0.00         Types: Cigarettes     Quit date: 1/1/1998  Smokeless tobacco: Never Used                      Alcohol use:  No 81 MG Oral Tab EC Take 81 mg by mouth every morning. take 1 tablet (81MG)  by ORAL route  every day Disp:  Rfl:    Ferrous Gluconate 325 MG Oral Tab Take 1 tablet by mouth 2 (two) times daily.  take 1 by Oral route 3 times every day Disp:  Rfl:        Aller

## 2018-06-13 ENCOUNTER — HOSPITAL ENCOUNTER (OUTPATIENT)
Dept: CT IMAGING | Facility: HOSPITAL | Age: 78
Discharge: HOME | End: 2018-06-13
Attending: UROLOGY
Payer: MEDICARE

## 2018-06-13 DIAGNOSIS — N13.30 HYDRONEPHROSIS OF LEFT KIDNEY: ICD-10-CM

## 2018-06-13 DIAGNOSIS — R10.9 LEFT FLANK PAIN: ICD-10-CM

## 2018-06-13 PROCEDURE — 82565 ASSAY OF CREATININE: CPT

## 2018-06-13 PROCEDURE — 74178 CT ABD&PLV WO CNTR FLWD CNTR: CPT | Performed by: UROLOGY

## 2018-06-21 ENCOUNTER — OFFICE VISIT (OUTPATIENT)
Dept: SURGERY | Facility: CLINIC | Age: 78
End: 2018-06-21

## 2018-06-21 VITALS
HEART RATE: 77 BPM | SYSTOLIC BLOOD PRESSURE: 125 MMHG | RESPIRATION RATE: 17 BRPM | HEIGHT: 68 IN | WEIGHT: 181 LBS | DIASTOLIC BLOOD PRESSURE: 90 MMHG | BODY MASS INDEX: 27.43 KG/M2

## 2018-06-21 DIAGNOSIS — R10.9 LEFT FLANK PAIN: Primary | ICD-10-CM

## 2018-06-21 DIAGNOSIS — N13.30 HYDRONEPHROSIS OF LEFT KIDNEY: ICD-10-CM

## 2018-06-21 PROCEDURE — 99213 OFFICE O/P EST LOW 20 MIN: CPT | Performed by: UROLOGY

## 2018-06-21 PROCEDURE — G0463 HOSPITAL OUTPT CLINIC VISIT: HCPCS | Performed by: UROLOGY

## 2018-06-21 NOTE — PROGRESS NOTES
Neeraj Sales is a 69year old male. HPI:   Patient presents with:  genital pain  Kidney Problem    72-year-old male presents in follow-up to a previous visit June 7, 2018.   The patient had been admitted in May 2018 with left-sided flank pain that mention of complication, not stated as uncontrolled       Past Surgical History:  No date: APPENDECTOMY  No date: CHEST X-RAY 1 VW      Comment: chest xray negative  No date: CHOLECYSTECTOMY  2005: INGUINAL HERNIA REPAIR Right  2001: OTHER SURGICAL HISTORY covered by insurance, glucometer, lancets and test strips.   Use to test blood glucose twice daily Disp: 1 kit Rfl: 0   atorvastatin 40 MG Oral Tab TAKE ONE TABLET BY MOUTH ONCE DAILY Disp: 90 tablet Rfl: 1   multivitamin Oral Tab Take 1 tablet by mouth germania Visit:    No prescriptions requested or ordered in this encounter    Imaging & Referrals:  None     6/21/2018  Garrick Gee MD

## 2018-06-29 RX ORDER — LISINOPRIL 5 MG/1
TABLET ORAL
Qty: 90 TABLET | Refills: 0 | Status: SHIPPED | OUTPATIENT
Start: 2018-06-29 | End: 2018-09-29

## 2018-07-10 ENCOUNTER — OFFICE VISIT (OUTPATIENT)
Dept: INTERNAL MEDICINE CLINIC | Facility: CLINIC | Age: 78
End: 2018-07-10

## 2018-07-10 ENCOUNTER — HOSPITAL ENCOUNTER (OUTPATIENT)
Dept: ULTRASOUND IMAGING | Facility: HOSPITAL | Age: 78
Discharge: HOME | End: 2018-07-10
Attending: INTERNAL MEDICINE | Admitting: INTERNAL MEDICINE
Payer: MEDICARE

## 2018-07-10 VITALS
BODY MASS INDEX: 27.89 KG/M2 | WEIGHT: 184 LBS | HEIGHT: 68 IN | HEART RATE: 85 BPM | DIASTOLIC BLOOD PRESSURE: 76 MMHG | SYSTOLIC BLOOD PRESSURE: 116 MMHG

## 2018-07-10 DIAGNOSIS — M79.661 RIGHT CALF PAIN: ICD-10-CM

## 2018-07-10 DIAGNOSIS — M79.661 RIGHT CALF PAIN: Primary | ICD-10-CM

## 2018-07-10 PROCEDURE — 99213 OFFICE O/P EST LOW 20 MIN: CPT | Performed by: INTERNAL MEDICINE

## 2018-07-10 PROCEDURE — G0463 HOSPITAL OUTPT CLINIC VISIT: HCPCS | Performed by: INTERNAL MEDICINE

## 2018-07-10 PROCEDURE — 93971 EXTREMITY STUDY: CPT | Performed by: INTERNAL MEDICINE

## 2018-07-10 NOTE — PROGRESS NOTES
Pt has been informed to report to [facility] hospital today at 7:30 for call and hold US.

## 2018-07-12 NOTE — PATIENT INSTRUCTIONS
ASSESSMENT/PLAN:   Right calf pain  (primary encounter diagnosis) this could be either ligament or muscle strain or tear also have to rule out DVT will order ultrasound for venous Doppler and also ultrasound of muscle ligament, ice as need med for svitlana

## 2018-07-12 NOTE — PROGRESS NOTES
HPI:    Patient ID: Josiah Lewis is a 69year old male. HPI  Patient comes in with complaint of right cough pain for a few days now he was walking when suddenly he felt the pain pain with pressure symptoms. Not much swelling. No direct trauma. Disp: 100 each Rfl: 3   Glucose Blood (ONETOUCH ULTRA BLUE) In Vitro Strip Testing twice a day  Diagnosis E11.65 Disp: 100 strip Rfl: 3   Blood Glucose Monitoring Suppl Does not apply Kit Please dispense unit covered by insurance, glucometer, lancets and t swelling    Neurological: He is alert and oriented to person, place, and time. He has normal reflexes. Skin: Skin is warm and dry. Psychiatric: He has a normal mood and affect.  His behavior is normal. Judgment and thought content normal.   Nursing note

## 2018-08-08 ENCOUNTER — HOSPITAL ENCOUNTER (OUTPATIENT)
Dept: CT IMAGING | Facility: HOSPITAL | Age: 78
Discharge: HOME | End: 2018-08-08
Attending: UROLOGY
Payer: MEDICARE

## 2018-08-08 DIAGNOSIS — N13.30 HYDRONEPHROSIS OF LEFT KIDNEY: ICD-10-CM

## 2018-08-08 LAB — CREAT BLD-MCNC: 1.2 MG/DL (ref 0.5–1.5)

## 2018-08-08 PROCEDURE — 74178 CT ABD&PLV WO CNTR FLWD CNTR: CPT | Performed by: UROLOGY

## 2018-08-08 PROCEDURE — 82565 ASSAY OF CREATININE: CPT

## 2018-08-16 ENCOUNTER — TELEPHONE (OUTPATIENT)
Dept: SURGERY | Facility: CLINIC | Age: 78
End: 2018-08-16

## 2018-08-16 ENCOUNTER — OFFICE VISIT (OUTPATIENT)
Dept: SURGERY | Facility: CLINIC | Age: 78
End: 2018-08-16
Payer: MEDICARE

## 2018-08-16 ENCOUNTER — LAB ENCOUNTER (OUTPATIENT)
Dept: LAB | Facility: HOSPITAL | Age: 78
End: 2018-08-16
Attending: UROLOGY
Payer: MEDICARE

## 2018-08-16 VITALS
BODY MASS INDEX: 27.89 KG/M2 | WEIGHT: 184 LBS | HEART RATE: 81 BPM | DIASTOLIC BLOOD PRESSURE: 79 MMHG | RESPIRATION RATE: 16 BRPM | SYSTOLIC BLOOD PRESSURE: 118 MMHG | TEMPERATURE: 98 F | HEIGHT: 68 IN

## 2018-08-16 DIAGNOSIS — R39.9 SYMPTOM INVOLVING BLADDER: ICD-10-CM

## 2018-08-16 DIAGNOSIS — N13.30 HYDRONEPHROSIS OF LEFT KIDNEY: Primary | ICD-10-CM

## 2018-08-16 DIAGNOSIS — Z01.818 PREOP EXAMINATION: ICD-10-CM

## 2018-08-16 DIAGNOSIS — N13.4 HYDROURETER ON LEFT: Primary | ICD-10-CM

## 2018-08-16 LAB
ANION GAP SERPL CALC-SCNC: 7 MMOL/L (ref 0–18)
BASOPHILS # BLD: 0 K/UL (ref 0–0.2)
BASOPHILS NFR BLD: 1 %
BUN SERPL-MCNC: 17 MG/DL (ref 8–20)
BUN/CREAT SERPL: 13.4 (ref 10–20)
CALCIUM SERPL-MCNC: 9.9 MG/DL (ref 8.5–10.5)
CHLORIDE SERPL-SCNC: 108 MMOL/L (ref 95–110)
CO2 SERPL-SCNC: 26 MMOL/L (ref 22–32)
CREAT SERPL-MCNC: 1.27 MG/DL (ref 0.5–1.5)
EOSINOPHIL # BLD: 0.5 K/UL (ref 0–0.7)
EOSINOPHIL NFR BLD: 7 %
ERYTHROCYTE [DISTWIDTH] IN BLOOD BY AUTOMATED COUNT: 13.3 % (ref 11–15)
GLUCOSE SERPL-MCNC: 50 MG/DL (ref 70–99)
HCT VFR BLD AUTO: 43.7 % (ref 41–52)
HGB BLD-MCNC: 14.7 G/DL (ref 13.5–17.5)
LYMPHOCYTES # BLD: 1.2 K/UL (ref 1–4)
LYMPHOCYTES NFR BLD: 20 %
MCH RBC QN AUTO: 32.3 PG (ref 27–32)
MCHC RBC AUTO-ENTMCNC: 33.7 G/DL (ref 32–37)
MCV RBC AUTO: 95.9 FL (ref 80–100)
MONOCYTES # BLD: 0.6 K/UL (ref 0–1)
MONOCYTES NFR BLD: 10 %
NEUTROPHILS # BLD AUTO: 3.9 K/UL (ref 1.8–7.7)
NEUTROPHILS NFR BLD: 62 %
OSMOLALITY UR CALC.SUM OF ELEC: 291 MOSM/KG (ref 275–295)
PLATELET # BLD AUTO: 289 K/UL (ref 140–400)
PMV BLD AUTO: 7.1 FL (ref 7.4–10.3)
POTASSIUM SERPL-SCNC: 5.1 MMOL/L (ref 3.3–5.1)
RBC # BLD AUTO: 4.56 M/UL (ref 4.5–5.9)
SODIUM SERPL-SCNC: 141 MMOL/L (ref 136–144)
WBC # BLD AUTO: 6.3 K/UL (ref 4–11)

## 2018-08-16 PROCEDURE — 80048 BASIC METABOLIC PNL TOTAL CA: CPT

## 2018-08-16 PROCEDURE — 85025 COMPLETE CBC W/AUTO DIFF WBC: CPT

## 2018-08-16 PROCEDURE — 88108 CYTOPATH CONCENTRATE TECH: CPT | Performed by: UROLOGY

## 2018-08-16 PROCEDURE — 99213 OFFICE O/P EST LOW 20 MIN: CPT | Performed by: UROLOGY

## 2018-08-16 PROCEDURE — 36415 COLL VENOUS BLD VENIPUNCTURE: CPT

## 2018-08-16 PROCEDURE — 87086 URINE CULTURE/COLONY COUNT: CPT

## 2018-08-16 PROCEDURE — G0463 HOSPITAL OUTPT CLINIC VISIT: HCPCS | Performed by: UROLOGY

## 2018-08-16 NOTE — TELEPHONE ENCOUNTER
Paged Dr. Roderick Mckeon to inform him of critical lab result, then phoned patient. Patient has hx of diabetes. Dr. Roderick Mckeon called back and is aware of patient's blood sugar = 50.  Per Dr. Katie Medrano, patient should drink orange juice, go home, test blood ortiz

## 2018-08-16 NOTE — TELEPHONE ENCOUNTER
Reviewing labs glucose was 50. Called patient and he states he has had lunch since his labs were done. Denies any lightheadedness, dizziness, or diaphoresis. 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   Assured

## 2018-08-16 NOTE — PROGRESS NOTES
Radha Nelson is a 69year old male.     HPI:   Patient presents with:  Kidney Problem: pt has no urology issues today as discussed in your last visit pt had  CT scan done 8/8/2016      69year-old male presents in follow-up to a previous visit June 21 Alcohol use:  No                 Medications (Active prior to today's visit):    Current Outpatient Prescriptions:  METFORMIN HCL 1000 MG Oral Tab TAKE 1 TABLET BY MOUTH TWICE DAILY Disp: 180 tablet Rfl: 0   LISINOPRIL 5 MG Oral Tab TAKE 1 TAB SWELLING    Comment:Other reaction(s): SHELLFISH DERIVED  Shellfish Allergy       UNKNOWN      ROS:       PHYSICAL EXAM:        ASSESSMENT/PLAN:   Assessment   Preop examination  Symptom involving bladder  Hydronephrosis of left kidney  (primary encounter

## 2018-08-16 NOTE — TELEPHONE ENCOUNTER
Patient seen in office, scheduled for cystoscopy, left retrograde pyelogram, left ureteroscopy, possible biopsy, left stent placement, Thursday 09/13/18 @ 2:00, [facility]/outpatient, went over pre/labs with patient, labs were done today, all questi

## 2018-08-17 ENCOUNTER — TELEPHONE (OUTPATIENT)
Dept: SURGERY | Facility: CLINIC | Age: 78
End: 2018-08-17

## 2018-08-17 NOTE — TELEPHONE ENCOUNTER
Notified patient regarding negative urine cytology. Advised to continue with current plan and he agreed.

## 2018-08-20 ENCOUNTER — OFFICE VISIT (OUTPATIENT)
Dept: INTERNAL MEDICINE CLINIC | Facility: CLINIC | Age: 78
End: 2018-08-20
Payer: MEDICARE

## 2018-08-20 VITALS
SYSTOLIC BLOOD PRESSURE: 99 MMHG | WEIGHT: 183 LBS | BODY MASS INDEX: 28 KG/M2 | OXYGEN SATURATION: 98 % | DIASTOLIC BLOOD PRESSURE: 65 MMHG | HEART RATE: 87 BPM | TEMPERATURE: 98 F

## 2018-08-20 DIAGNOSIS — I73.9 CLAUDICATION (HCC): ICD-10-CM

## 2018-08-20 DIAGNOSIS — M79.661 RIGHT CALF PAIN: Primary | ICD-10-CM

## 2018-08-20 PROCEDURE — 99213 OFFICE O/P EST LOW 20 MIN: CPT | Performed by: INTERNAL MEDICINE

## 2018-08-20 NOTE — PROGRESS NOTES
HPI:    Patient ID: Jerson Shay is a 69year old male. HPI  Calf pain  Patient with right calf pain for last 2 months. Worse with walking, feels better after he stops walking.  When he rests a few days, it is better, then recurs if he walks too mu route 3 times every day Disp:  Rfl:    ascorbic acid, VITAMIN C, 250 MG Oral Tab Take 500 mg by mouth daily.  Disp:  Rfl:      Allergies:  Iodine-131                  Comment:Other reaction(s): Unknown  Shellfish               SWELLING    Comment:Other reac

## 2018-08-20 NOTE — PATIENT INSTRUCTIONS
ASSESSMENT/PLAN:   Diagnoses and all orders for this visit:    Right calf pain  -     US ART LE DOPPLERS (WLZ=[id number]); Future    Claudication ([address]  -     US ART LE DOPPLERS ([id number]=[id number]); Future    patient with right calf pain with exertion.  Sounds like

## 2018-08-21 ENCOUNTER — HOSPITAL ENCOUNTER (OUTPATIENT)
Dept: ULTRASOUND IMAGING | Facility: HOSPITAL | Age: 78
Discharge: HOME | End: 2018-08-21
Attending: INTERNAL MEDICINE
Payer: MEDICARE

## 2018-08-21 DIAGNOSIS — M79.661 RIGHT CALF PAIN: ICD-10-CM

## 2018-08-21 DIAGNOSIS — I73.9 CLAUDICATION (HCC): ICD-10-CM

## 2018-08-21 PROCEDURE — 93924 LWR XTR VASC STDY BILAT: CPT | Performed by: INTERNAL MEDICINE

## 2018-08-31 ENCOUNTER — TELEPHONE (OUTPATIENT)
Dept: INTERNAL MEDICINE CLINIC | Facility: CLINIC | Age: 78
End: 2018-08-31

## 2018-08-31 NOTE — TELEPHONE ENCOUNTER
Patient phoned office stating that he met with Dr. Bianka Izquierdo who wants to do a procedure on patient's back (sounds like a laminectomy). Patient was wondering if you cleared him for this surgery and what his next steps are.  Should he come in for preop clearjayson

## 2018-09-04 ENCOUNTER — LAB ENCOUNTER (OUTPATIENT)
Dept: LAB | Facility: HOSPITAL | Age: 78
End: 2018-09-04
Attending: INTERNAL MEDICINE
Payer: MEDICARE

## 2018-09-04 ENCOUNTER — OFFICE VISIT (OUTPATIENT)
Dept: INTERNAL MEDICINE CLINIC | Facility: CLINIC | Age: 78
End: 2018-09-04
Payer: MEDICARE

## 2018-09-04 VITALS
DIASTOLIC BLOOD PRESSURE: 70 MMHG | WEIGHT: 186 LBS | BODY MASS INDEX: 28.19 KG/M2 | HEART RATE: 79 BPM | HEIGHT: 68 IN | SYSTOLIC BLOOD PRESSURE: 114 MMHG

## 2018-09-04 DIAGNOSIS — M54.50 LUMBAR SPINE PAIN: ICD-10-CM

## 2018-09-04 DIAGNOSIS — R93.41 ABNORMAL COMPUTED TOMOGRAPHY OF URETER: ICD-10-CM

## 2018-09-04 DIAGNOSIS — Z01.818 PRE-OP EVALUATION: ICD-10-CM

## 2018-09-04 DIAGNOSIS — Z01.818 PRE-OP EVALUATION: Primary | ICD-10-CM

## 2018-09-04 DIAGNOSIS — I73.9 CLAUDICATION OF CALF MUSCLES (HCC): ICD-10-CM

## 2018-09-04 LAB
ALBUMIN SERPL BCP-MCNC: 4.1 G/DL (ref 3.5–4.8)
ALBUMIN/GLOB SERPL: 1.7 {RATIO} (ref 1–2)
ALP SERPL-CCNC: 62 U/L (ref 32–100)
ALT SERPL-CCNC: 34 U/L (ref 17–63)
ANION GAP SERPL CALC-SCNC: 5 MMOL/L (ref 0–18)
AST SERPL-CCNC: 28 U/L (ref 15–41)
BACTERIA UR QL AUTO: NEGATIVE /HPF
BASOPHILS # BLD: 0 K/UL (ref 0–0.2)
BASOPHILS NFR BLD: 1 %
BILIRUB SERPL-MCNC: 0.5 MG/DL (ref 0.3–1.2)
BILIRUB UR QL: NEGATIVE
BUN SERPL-MCNC: 19 MG/DL (ref 8–20)
BUN/CREAT SERPL: 15 (ref 10–20)
CALCIUM SERPL-MCNC: 9.4 MG/DL (ref 8.5–10.5)
CHLORIDE SERPL-SCNC: 108 MMOL/L (ref 95–110)
CLARITY UR: CLEAR
CO2 SERPL-SCNC: 27 MMOL/L (ref 22–32)
COLOR UR: YELLOW
CREAT SERPL-MCNC: 1.27 MG/DL (ref 0.5–1.5)
EOSINOPHIL # BLD: 0.5 K/UL (ref 0–0.7)
EOSINOPHIL NFR BLD: 7 %
ERYTHROCYTE [DISTWIDTH] IN BLOOD BY AUTOMATED COUNT: 13.3 % (ref 11–15)
GLOBULIN PLAS-MCNC: 2.4 G/DL (ref 2.5–3.7)
GLUCOSE SERPL-MCNC: 118 MG/DL (ref 70–99)
GLUCOSE UR-MCNC: NEGATIVE MG/DL
HCT VFR BLD AUTO: 42.9 % (ref 41–52)
HGB BLD-MCNC: 14.3 G/DL (ref 13.5–17.5)
HGB UR QL STRIP.AUTO: NEGATIVE
INR BLD: 0.9 (ref 0.9–1.2)
LEUKOCYTE ESTERASE UR QL STRIP.AUTO: NEGATIVE
LYMPHOCYTES # BLD: 1.8 K/UL (ref 1–4)
LYMPHOCYTES NFR BLD: 24 %
MCH RBC QN AUTO: 31.9 PG (ref 27–32)
MCHC RBC AUTO-ENTMCNC: 33.4 G/DL (ref 32–37)
MCV RBC AUTO: 95.7 FL (ref 80–100)
MONOCYTES # BLD: 0.6 K/UL (ref 0–1)
MONOCYTES NFR BLD: 8 %
NEUTROPHILS # BLD AUTO: 4.4 K/UL (ref 1.8–7.7)
NEUTROPHILS NFR BLD: 60 %
NITRITE UR QL STRIP.AUTO: NEGATIVE
OSMOLALITY UR CALC.SUM OF ELEC: 293 MOSM/KG (ref 275–295)
PATIENT FASTING: NO
PH UR: 5 [PH] (ref 5–8)
PLATELET # BLD AUTO: 303 K/UL (ref 140–400)
PMV BLD AUTO: 7.6 FL (ref 7.4–10.3)
POTASSIUM SERPL-SCNC: 4.9 MMOL/L (ref 3.3–5.1)
PROT SERPL-MCNC: 6.5 G/DL (ref 5.9–8.4)
PROT UR-MCNC: NEGATIVE MG/DL
PROTHROMBIN TIME: 11.6 SECONDS (ref 11.8–14.5)
RBC # BLD AUTO: 4.48 M/UL (ref 4.5–5.9)
RBC #/AREA URNS AUTO: 1 /HPF
SODIUM SERPL-SCNC: 140 MMOL/L (ref 136–144)
SP GR UR STRIP: 1.03 (ref 1–1.03)
UROBILINOGEN UR STRIP-ACNC: <2
VIT C UR-MCNC: 40 MG/DL
WBC # BLD AUTO: 7.2 K/UL (ref 4–11)
WBC #/AREA URNS AUTO: <1 /HPF

## 2018-09-04 PROCEDURE — 36415 COLL VENOUS BLD VENIPUNCTURE: CPT

## 2018-09-04 PROCEDURE — 93000 ELECTROCARDIOGRAM COMPLETE: CPT | Performed by: INTERNAL MEDICINE

## 2018-09-04 PROCEDURE — 85610 PROTHROMBIN TIME: CPT

## 2018-09-04 PROCEDURE — 85025 COMPLETE CBC W/AUTO DIFF WBC: CPT

## 2018-09-04 PROCEDURE — 80053 COMPREHEN METABOLIC PANEL: CPT

## 2018-09-04 PROCEDURE — 81003 URINALYSIS AUTO W/O SCOPE: CPT

## 2018-09-04 PROCEDURE — 99214 OFFICE O/P EST MOD 30 MIN: CPT | Performed by: INTERNAL MEDICINE

## 2018-09-04 NOTE — PATIENT INSTRUCTIONS
ASSESSMENT/PLAN:   Pre-op evaluation  (primary encounter diagnosis) exam as above EKG with normal sinus rhythm no ST-T wave changes normal axis pending lab results she will be cleared for surgery  Lumbar spine pain-follow with Dr. Lenny Hood of

## 2018-09-04 NOTE — PROGRESS NOTES
HPI:    Patient ID: Zak Hendrickson is a 69year old male. HPI  Patient comes in today for clearance for low back surgery by Dr. Ludivina Walters is getting L4 problem which causes pain and claudication to the right calf.   Patient also with history of left u apply Misc Testing twice a day   Diagnosis E11.65 Disp: 100 each Rfl: 3   Glucose Blood (ONETOUCH ULTRA BLUE) In Vitro Strip Testing twice a day  Diagnosis E11.65 Disp: 100 strip Rfl: 3   Blood Glucose Monitoring Suppl Does not apply Kit Please dispense un Packs/day: 1.00      Years: 0.00         Types: Cigarettes     Quit date: 1/1/1998  Smokeless tobacco: Never Used                      Alcohol use:  No                 PHYSICAL EXAM:    Physical Exam   Constitutional: He is oriented to per Prothrombin Time (PT) [E]    Meds This Visit:  No prescriptions requested or ordered in this encounter    Imaging & Referrals:  ELECTROCARDIOGRAM, COMPLETE        [place]#[id number]

## 2018-09-06 ENCOUNTER — TELEPHONE (OUTPATIENT)
Dept: SURGERY | Facility: CLINIC | Age: 78
End: 2018-09-06

## 2018-09-13 ENCOUNTER — APPOINTMENT (OUTPATIENT)
Dept: GENERAL RADIOLOGY | Facility: HOSPITAL | Age: 78
End: 2018-09-13
Attending: UROLOGY
Payer: MEDICARE

## 2018-09-13 ENCOUNTER — HOSPITAL ENCOUNTER (OUTPATIENT)
Facility: HOSPITAL | Age: 78
Setting detail: HOSPITAL OUTPATIENT SURGERY
Discharge: HOME | End: 2018-09-13
Attending: UROLOGY | Admitting: UROLOGY
Payer: MEDICARE

## 2018-09-13 ENCOUNTER — TELEPHONE (OUTPATIENT)
Dept: SURGERY | Facility: CLINIC | Age: 78
End: 2018-09-13

## 2018-09-13 ENCOUNTER — ANESTHESIA EVENT (OUTPATIENT)
Dept: SURGERY | Facility: HOSPITAL | Age: 78
End: 2018-09-13
Payer: MEDICARE

## 2018-09-13 ENCOUNTER — ANESTHESIA (OUTPATIENT)
Dept: SURGERY | Facility: HOSPITAL | Age: 78
End: 2018-09-13
Payer: MEDICARE

## 2018-09-13 VITALS
HEIGHT: 68 IN | SYSTOLIC BLOOD PRESSURE: 130 MMHG | WEIGHT: 180 LBS | DIASTOLIC BLOOD PRESSURE: 83 MMHG | BODY MASS INDEX: 27.28 KG/M2 | TEMPERATURE: 97 F | HEART RATE: 66 BPM | OXYGEN SATURATION: 97 % | RESPIRATION RATE: 10 BRPM

## 2018-09-13 DIAGNOSIS — N13.4 HYDROURETER ON LEFT: ICD-10-CM

## 2018-09-13 LAB
GLUCOSE BLDC GLUCOMTR-MCNC: 123 MG/DL (ref 70–99)
GLUCOSE BLDC GLUCOMTR-MCNC: 126 MG/DL (ref 70–99)

## 2018-09-13 PROCEDURE — 74420 UROGRAPHY RTRGR +-KUB: CPT | Performed by: UROLOGY

## 2018-09-13 PROCEDURE — BT1F1ZZ FLUOROSCOPY OF LEFT KIDNEY, URETER AND BLADDER USING LOW OSMOLAR CONTRAST: ICD-10-PCS | Performed by: UROLOGY

## 2018-09-13 PROCEDURE — 0T778DZ DILATION OF LEFT URETER WITH INTRALUMINAL DEVICE, VIA NATURAL OR ARTIFICIAL OPENING ENDOSCOPIC: ICD-10-PCS | Performed by: UROLOGY

## 2018-09-13 PROCEDURE — 52332 CYSTOSCOPY AND TREATMENT: CPT | Performed by: UROLOGY

## 2018-09-13 DEVICE — STENT URET 6F 26CM WO GW INL: Type: IMPLANTABLE DEVICE | Site: URETER | Status: FUNCTIONAL

## 2018-09-13 RX ORDER — HYDROCODONE BITARTRATE AND ACETAMINOPHEN 5; 325 MG/1; MG/1
1 TABLET ORAL AS NEEDED
Status: DISCONTINUED | OUTPATIENT
Start: 2018-09-13 | End: 2018-09-13

## 2018-09-13 RX ORDER — ACETAMINOPHEN 500 MG
1000 TABLET ORAL ONCE
Status: DISCONTINUED | OUTPATIENT
Start: 2018-09-13 | End: 2018-09-13 | Stop reason: HOSPADM

## 2018-09-13 RX ORDER — SODIUM CHLORIDE, SODIUM LACTATE, POTASSIUM CHLORIDE, CALCIUM CHLORIDE 600; 310; 30; 20 MG/100ML; MG/100ML; MG/100ML; MG/100ML
INJECTION, SOLUTION INTRAVENOUS CONTINUOUS
Status: DISCONTINUED | OUTPATIENT
Start: 2018-09-13 | End: 2018-09-13

## 2018-09-13 RX ORDER — ONDANSETRON 2 MG/ML
4 INJECTION INTRAMUSCULAR; INTRAVENOUS ONCE AS NEEDED
Status: DISCONTINUED | OUTPATIENT
Start: 2018-09-13 | End: 2018-09-13

## 2018-09-13 RX ORDER — LIDOCAINE HYDROCHLORIDE 20 MG/ML
JELLY TOPICAL AS NEEDED
Status: DISCONTINUED | OUTPATIENT
Start: 2018-09-13 | End: 2018-09-13 | Stop reason: HOSPADM

## 2018-09-13 RX ORDER — PHENAZOPYRIDINE HYDROCHLORIDE 200 MG/1
200 TABLET, FILM COATED ORAL ONCE
Status: COMPLETED | OUTPATIENT
Start: 2018-09-13 | End: 2018-09-13

## 2018-09-13 RX ORDER — LEVOFLOXACIN 5 MG/ML
500 INJECTION, SOLUTION INTRAVENOUS ONCE
Status: COMPLETED | OUTPATIENT
Start: 2018-09-13 | End: 2018-09-13

## 2018-09-13 RX ORDER — HYDROCODONE BITARTRATE AND ACETAMINOPHEN 5; 325 MG/1; MG/1
2 TABLET ORAL AS NEEDED
Status: DISCONTINUED | OUTPATIENT
Start: 2018-09-13 | End: 2018-09-13

## 2018-09-13 RX ORDER — MORPHINE SULFATE 4 MG/ML
2 INJECTION, SOLUTION INTRAMUSCULAR; INTRAVENOUS EVERY 10 MIN PRN
Status: DISCONTINUED | OUTPATIENT
Start: 2018-09-13 | End: 2018-09-13

## 2018-09-13 RX ORDER — MORPHINE SULFATE 10 MG/ML
6 INJECTION, SOLUTION INTRAMUSCULAR; INTRAVENOUS EVERY 10 MIN PRN
Status: DISCONTINUED | OUTPATIENT
Start: 2018-09-13 | End: 2018-09-13

## 2018-09-13 RX ORDER — DEXTROSE MONOHYDRATE 25 G/50ML
50 INJECTION, SOLUTION INTRAVENOUS
Status: DISCONTINUED | OUTPATIENT
Start: 2018-09-13 | End: 2018-09-13

## 2018-09-13 RX ORDER — EPHEDRINE SULFATE 50 MG/ML
INJECTION, SOLUTION INTRAVENOUS AS NEEDED
Status: DISCONTINUED | OUTPATIENT
Start: 2018-09-13 | End: 2018-09-13 | Stop reason: SURG

## 2018-09-13 RX ORDER — MORPHINE SULFATE 4 MG/ML
4 INJECTION, SOLUTION INTRAMUSCULAR; INTRAVENOUS EVERY 10 MIN PRN
Status: DISCONTINUED | OUTPATIENT
Start: 2018-09-13 | End: 2018-09-13

## 2018-09-13 RX ORDER — PHENAZOPYRIDINE HYDROCHLORIDE 200 MG/1
200 TABLET, FILM COATED ORAL 3 TIMES DAILY PRN
Qty: 10 TABLET | Refills: 0 | Status: SHIPPED | OUTPATIENT
Start: 2018-09-13 | End: 2018-10-16

## 2018-09-13 RX ORDER — FAMOTIDINE 20 MG/1
20 TABLET ORAL ONCE
Status: DISCONTINUED | OUTPATIENT
Start: 2018-09-13 | End: 2018-09-13 | Stop reason: HOSPADM

## 2018-09-13 RX ORDER — ONDANSETRON 2 MG/ML
INJECTION INTRAMUSCULAR; INTRAVENOUS AS NEEDED
Status: DISCONTINUED | OUTPATIENT
Start: 2018-09-13 | End: 2018-09-13 | Stop reason: SURG

## 2018-09-13 RX ORDER — NALOXONE HYDROCHLORIDE 0.4 MG/ML
80 INJECTION, SOLUTION INTRAMUSCULAR; INTRAVENOUS; SUBCUTANEOUS AS NEEDED
Status: DISCONTINUED | OUTPATIENT
Start: 2018-09-13 | End: 2018-09-13

## 2018-09-13 RX ORDER — CEFADROXIL 500 MG/1
500 CAPSULE ORAL 2 TIMES DAILY
Qty: 6 CAPSULE | Refills: 0 | Status: SHIPPED | OUTPATIENT
Start: 2018-09-14 | End: 2018-09-17

## 2018-09-13 RX ORDER — HALOPERIDOL 5 MG/ML
0.25 INJECTION INTRAMUSCULAR ONCE AS NEEDED
Status: DISCONTINUED | OUTPATIENT
Start: 2018-09-13 | End: 2018-09-13

## 2018-09-13 RX ORDER — METOCLOPRAMIDE 10 MG/1
10 TABLET ORAL ONCE
Status: DISCONTINUED | OUTPATIENT
Start: 2018-09-13 | End: 2018-09-13 | Stop reason: HOSPADM

## 2018-09-13 RX ORDER — LIDOCAINE HYDROCHLORIDE 10 MG/ML
INJECTION, SOLUTION EPIDURAL; INFILTRATION; INTRACAUDAL; PERINEURAL AS NEEDED
Status: DISCONTINUED | OUTPATIENT
Start: 2018-09-13 | End: 2018-09-13 | Stop reason: SURG

## 2018-09-13 RX ADMIN — LEVOFLOXACIN 500 MG: 5 INJECTION, SOLUTION INTRAVENOUS at 14:13:00

## 2018-09-13 RX ADMIN — ONDANSETRON 4 MG: 2 INJECTION INTRAMUSCULAR; INTRAVENOUS at 14:43:00

## 2018-09-13 RX ADMIN — EPHEDRINE SULFATE 5 MG: 50 INJECTION, SOLUTION INTRAVENOUS at 14:19:00

## 2018-09-13 RX ADMIN — EPHEDRINE SULFATE 10 MG: 50 INJECTION, SOLUTION INTRAVENOUS at 14:27:00

## 2018-09-13 RX ADMIN — LIDOCAINE HYDROCHLORIDE 20 MG: 10 INJECTION, SOLUTION EPIDURAL; INFILTRATION; INTRACAUDAL; PERINEURAL at 14:08:00

## 2018-09-13 RX ADMIN — SODIUM CHLORIDE, SODIUM LACTATE, POTASSIUM CHLORIDE, CALCIUM CHLORIDE: 600; 310; 30; 20 INJECTION, SOLUTION INTRAVENOUS at 14:06:00

## 2018-09-13 RX ADMIN — SODIUM CHLORIDE, SODIUM LACTATE, POTASSIUM CHLORIDE, CALCIUM CHLORIDE: 600; 310; 30; 20 INJECTION, SOLUTION INTRAVENOUS at 14:43:00

## 2018-09-13 NOTE — OPERATIVE REPORT
[facility]    Operative Note     Kasey Lester Location: OR   [facility] [id number] MRN [id number]   Admission Date 9/13/2018 Operation Date 9/13/2018   Attending Physician Navid Perea MD Operating Physician MD René Parada hydroureter or hydronephrosis could be appreciated. At this point I placed to sensor wires on the left side.   Over 1 of those wires I then placed a Stortz digital ureteroscope without resistance was able to advance the scope all the way up to the collecti

## 2018-09-13 NOTE — ANESTHESIA POSTPROCEDURE EVALUATION
Patient: Chriss Guerra    Procedure Summary     Date:  09/13/18 Room / Location:  [facility] OR  / [facility] OR    Anesthesia Start:  0444 Anesthesia Stop:  4476    Procedures:       CYSTOSCOPY RETROGRADE (Left Ureter)      CYSTOSCOPY URETEROSCOPY (Lef

## 2018-09-13 NOTE — TELEPHONE ENCOUNTER
Staff this patient had a stent placed today in the operating room. He needs an office cystoscopy and stent removal 2 weeks from now. Please place his name and the stent logbook to follow him.

## 2018-09-13 NOTE — H&P
71-year-old male presents in follow-up to a previous visit June 21, 2018. He has had intermittent episodes of left-sided flank pain although none since I saw him June 21, 2018.   Previous imaging studies demonstrated slight irregularity about the left mid Oral Tab TAKE 1 TABLET BY MOUTH TWICE DAILY Disp: 180 tablet Rfl: 0   LISINOPRIL 5 MG Oral Tab TAKE 1 TABLET BY MOUTH ONCE DAILY Disp: 90 tablet Rfl: 0   Pantoprazole Sodium 40 MG Oral Tab EC Take 1 tablet (40 mg total) by mouth every morning before breakf     NAD  Alert and oriented x3  Intact neurologically grossly  No LE edema  Phallus normal.  ASSESSMENT/PLAN:   Assessment   Preop examination  Symptom involving bladder  Hydronephrosis of left kidney  (primary encounter diagnosis)        Reviewed anna

## 2018-09-13 NOTE — INTERVAL H&P NOTE
Pre-op Diagnosis: Hydroureter on left [N13.4]    The above referenced H&P was reviewed by Kiley Armendariz MD on 9/13/2018, the patient was examined and no significant changes have occurred in the patient's condition since the H&P was performed.   I discussed

## 2018-09-13 NOTE — ANESTHESIA PROCEDURE NOTES
ANESTHESIA INTUBATION  Date/Time: 9/13/2018 2:11 PM  Urgency: elective    Airway not difficult    General Information and Staff    Patient location during procedure: OR  Anesthesiologist: Ricky Shi MD  Resident/CRNA: Rebekah Lane CRNA  Perfor

## 2018-09-13 NOTE — ANESTHESIA PREPROCEDURE EVALUATION
Anesthesia PreOp Note    HPI:     Colton Castillo is a 69year old male who presents for preoperative consultation requested by:  Colleen Headley MD    Date of Surgery: 9/13/2018    Procedure(s):  CYSTOSCOPY RETROGRADE  CYSTOSCOPY URETEROSCOPY  URINARY B Sciatica         Date Noted: 06/12/2009        Past Medical History:   Diagnosis Date   • Calculus of kidney    • Cataracts, bilateral 12-7-15   • Glaucoma    • History of blood transfusion     5 years ago   • Hypogammaglobulinemia (HCC)     mild, IgG1 and Take 300 mg by mouth. Disp:  Rfl:  9/7/2018   Glucose Blood (ONETOUCH ULTRA BLUE) In Vitro Strip  Disp:  Rfl:  Past Week at Unknown time   aspirin (ASPIR-81) 81 MG Oral Tab EC Take 81 mg by mouth every morning.  take 1 tablet (81MG)  by ORAL route  every Alcohol use: Yes        Alcohol/week: 0.0 oz        Comment: rarely      Drug use: No      Sexual activity: Not on file    Other Topics      Concerns:         Service: Not Asked        Blood Transfusions: Not Asked        Caffeine Concern:  Yes Dental - normal exam     Pulmonary - negative ROS and normal exam   Cardiovascular - normal exam  (+) hypertension,     Rhythm: regular  Rate: normal    Neuro/Psych      GI/Hepatic/Renal      Endo/Other    (+) diabetes mellitus,   Abdominal  - normal exam

## 2018-09-15 ENCOUNTER — TELEPHONE (OUTPATIENT)
Dept: SURGERY | Facility: CLINIC | Age: 78
End: 2018-09-15

## 2018-09-17 NOTE — TELEPHONE ENCOUNTER
Pt called stating pt had a procedure 9-13-18 and will need to have a stent removed. Call pt to schedule.

## 2018-09-17 NOTE — TELEPHONE ENCOUNTER
I scheduled cysto stent removal on Friday 9/28 @ 2:00 pm in [address] [place] [address] room. Dr. Niurka Mcdaniel, is this ok? Please route back so that we may call the patient. Thanks. Name highlighted in surgery book that patient received stent.

## 2018-09-18 NOTE — TELEPHONE ENCOUNTER
Phoned pt and spoke with him. Informed him of procedure as scheduled in [facility] and in Nurse Tayler's t.e. Pt states he wants Benny Maxwell to know that if he had been told he was going to have a stent placed, he never would have agreed to the biopsy.  Informed p

## 2018-09-20 ENCOUNTER — HOSPITAL ENCOUNTER (INPATIENT)
Facility: HOSPITAL | Age: 78
LOS: 1 days | Discharge: HOME | DRG: 684 | End: 2018-09-22
Attending: EMERGENCY MEDICINE | Admitting: INTERNAL MEDICINE
Payer: MEDICARE

## 2018-09-20 ENCOUNTER — APPOINTMENT (OUTPATIENT)
Dept: CT IMAGING | Facility: HOSPITAL | Age: 78
DRG: 684 | End: 2018-09-20
Attending: EMERGENCY MEDICINE
Payer: MEDICARE

## 2018-09-20 DIAGNOSIS — N20.0 KIDNEY STONE: Primary | ICD-10-CM

## 2018-09-20 DIAGNOSIS — N17.9 AKI (ACUTE KIDNEY INJURY) (HCC): ICD-10-CM

## 2018-09-20 PROBLEM — E87.5 HYPERKALEMIA: Status: ACTIVE | Noted: 2018-09-20

## 2018-09-20 PROBLEM — N13.2 HYDRONEPHROSIS WITH URINARY OBSTRUCTION DUE TO RENAL CALCULUS: Status: ACTIVE | Noted: 2018-09-20

## 2018-09-20 LAB
ANION GAP SERPL CALC-SCNC: 10 MMOL/L (ref 0–18)
ANION GAP SERPL CALC-SCNC: 6 MMOL/L (ref 0–18)
BASOPHILS # BLD: 0.1 K/UL (ref 0–0.2)
BASOPHILS NFR BLD: 1 %
BUN SERPL-MCNC: 25 MG/DL (ref 8–20)
BUN SERPL-MCNC: 26 MG/DL (ref 8–20)
BUN/CREAT SERPL: 14.7 (ref 10–20)
BUN/CREAT SERPL: 15.5 (ref 10–20)
CALCIUM SERPL-MCNC: 8.7 MG/DL (ref 8.5–10.5)
CALCIUM SERPL-MCNC: 9.7 MG/DL (ref 8.5–10.5)
CHLORIDE SERPL-SCNC: 105 MMOL/L (ref 95–110)
CHLORIDE SERPL-SCNC: 108 MMOL/L (ref 95–110)
CO2 SERPL-SCNC: 22 MMOL/L (ref 22–32)
CO2 SERPL-SCNC: 25 MMOL/L (ref 22–32)
CREAT SERPL-MCNC: 1.61 MG/DL (ref 0.5–1.5)
CREAT SERPL-MCNC: 1.77 MG/DL (ref 0.5–1.5)
EOSINOPHIL # BLD: 0.6 K/UL (ref 0–0.7)
EOSINOPHIL NFR BLD: 7 %
ERYTHROCYTE [DISTWIDTH] IN BLOOD BY AUTOMATED COUNT: 13 % (ref 11–15)
ERYTHROCYTE [DISTWIDTH] IN BLOOD BY AUTOMATED COUNT: 13 % (ref 11–15)
GLUCOSE BLDC GLUCOMTR-MCNC: 162 MG/DL (ref 70–99)
GLUCOSE BLDC GLUCOMTR-MCNC: 188 MG/DL (ref 70–99)
GLUCOSE BLDC GLUCOMTR-MCNC: 201 MG/DL (ref 70–99)
GLUCOSE BLDC GLUCOMTR-MCNC: 204 MG/DL (ref 70–99)
GLUCOSE BLDC GLUCOMTR-MCNC: 208 MG/DL (ref 70–99)
GLUCOSE SERPL-MCNC: 192 MG/DL (ref 70–99)
GLUCOSE SERPL-MCNC: 226 MG/DL (ref 70–99)
HCT VFR BLD AUTO: 38.6 % (ref 41–52)
HCT VFR BLD AUTO: 43 % (ref 41–52)
HGB BLD-MCNC: 12.8 G/DL (ref 13.5–17.5)
HGB BLD-MCNC: 14.1 G/DL (ref 13.5–17.5)
LYMPHOCYTES # BLD: 2.9 K/UL (ref 1–4)
LYMPHOCYTES NFR BLD: 34 %
MCH RBC QN AUTO: 31.5 PG (ref 27–32)
MCH RBC QN AUTO: 31.7 PG (ref 27–32)
MCHC RBC AUTO-ENTMCNC: 32.8 G/DL (ref 32–37)
MCHC RBC AUTO-ENTMCNC: 33.3 G/DL (ref 32–37)
MCV RBC AUTO: 95.5 FL (ref 80–100)
MCV RBC AUTO: 95.9 FL (ref 80–100)
MONOCYTES # BLD: 0.8 K/UL (ref 0–1)
MONOCYTES NFR BLD: 9 %
NEUTROPHILS # BLD AUTO: 4.4 K/UL (ref 1.8–7.7)
NEUTROPHILS NFR BLD: 50 %
OSMOLALITY UR CALC.SUM OF ELEC: 294 MOSM/KG (ref 275–295)
OSMOLALITY UR CALC.SUM OF ELEC: 300 MOSM/KG (ref 275–295)
PLATELET # BLD AUTO: 280 K/UL (ref 140–400)
PLATELET # BLD AUTO: 358 K/UL (ref 140–400)
PMV BLD AUTO: 7.3 FL (ref 7.4–10.3)
PMV BLD AUTO: 7.5 FL (ref 7.4–10.3)
POTASSIUM SERPL-SCNC: 4.7 MMOL/L (ref 3.3–5.1)
POTASSIUM SERPL-SCNC: 5.7 MMOL/L (ref 3.3–5.1)
POTASSIUM SERPL-SCNC: 6.3 MMOL/L (ref 3.3–5.1)
RBC # BLD AUTO: 4.04 M/UL (ref 4.5–5.9)
RBC # BLD AUTO: 4.49 M/UL (ref 4.5–5.9)
RBC #/AREA URNS AUTO: 1559 /HPF
SODIUM SERPL-SCNC: 136 MMOL/L (ref 136–144)
SODIUM SERPL-SCNC: 140 MMOL/L (ref 136–144)
SP GR UR STRIP: 1.02 (ref 1–1.03)
WBC # BLD AUTO: 8.8 K/UL (ref 4–11)
WBC # BLD AUTO: 9.1 K/UL (ref 4–11)
WBC #/AREA URNS AUTO: 48 /HPF

## 2018-09-20 PROCEDURE — 99222 1ST HOSP IP/OBS MODERATE 55: CPT | Performed by: INTERNAL MEDICINE

## 2018-09-20 PROCEDURE — 74176 CT ABD & PELVIS W/O CONTRAST: CPT | Performed by: EMERGENCY MEDICINE

## 2018-09-20 RX ORDER — SODIUM CHLORIDE 9 MG/ML
INJECTION, SOLUTION INTRAVENOUS CONTINUOUS
Status: DISCONTINUED | OUTPATIENT
Start: 2018-09-20 | End: 2018-09-22

## 2018-09-20 RX ORDER — SODIUM CHLORIDE 9 MG/ML
INJECTION, SOLUTION INTRAVENOUS ONCE
Status: COMPLETED | OUTPATIENT
Start: 2018-09-20 | End: 2018-09-20

## 2018-09-20 RX ORDER — DEXTROSE MONOHYDRATE 25 G/50ML
50 INJECTION, SOLUTION INTRAVENOUS AS NEEDED
Status: DISCONTINUED | OUTPATIENT
Start: 2018-09-20 | End: 2018-09-22

## 2018-09-20 RX ORDER — ONDANSETRON 2 MG/ML
4 INJECTION INTRAMUSCULAR; INTRAVENOUS ONCE
Status: COMPLETED | OUTPATIENT
Start: 2018-09-20 | End: 2018-09-20

## 2018-09-20 RX ORDER — HYDROCODONE BITARTRATE AND ACETAMINOPHEN 5; 325 MG/1; MG/1
1 TABLET ORAL EVERY 6 HOURS PRN
Status: DISCONTINUED | OUTPATIENT
Start: 2018-09-20 | End: 2018-09-22

## 2018-09-20 RX ORDER — MORPHINE SULFATE 4 MG/ML
4 INJECTION, SOLUTION INTRAMUSCULAR; INTRAVENOUS ONCE
Status: COMPLETED | OUTPATIENT
Start: 2018-09-20 | End: 2018-09-20

## 2018-09-20 RX ORDER — ASCORBIC ACID 500 MG
500 TABLET ORAL DAILY
Status: DISCONTINUED | OUTPATIENT
Start: 2018-09-20 | End: 2018-09-22

## 2018-09-20 RX ORDER — ONDANSETRON 2 MG/ML
4 INJECTION INTRAMUSCULAR; INTRAVENOUS EVERY 6 HOURS PRN
Status: DISCONTINUED | OUTPATIENT
Start: 2018-09-20 | End: 2018-09-22

## 2018-09-20 RX ORDER — PHENAZOPYRIDINE HYDROCHLORIDE 200 MG/1
200 TABLET, FILM COATED ORAL 3 TIMES DAILY PRN
Status: DISCONTINUED | OUTPATIENT
Start: 2018-09-20 | End: 2018-09-22

## 2018-09-20 RX ORDER — SODIUM POLYSTYRENE SULFONATE 15 G/60ML
15 SUSPENSION ORAL; RECTAL ONCE
Status: COMPLETED | OUTPATIENT
Start: 2018-09-20 | End: 2018-09-20

## 2018-09-20 RX ORDER — 0.9 % SODIUM CHLORIDE 0.9 %
VIAL (ML) INJECTION
Status: COMPLETED
Start: 2018-09-20 | End: 2018-09-20

## 2018-09-20 RX ORDER — MORPHINE SULFATE 2 MG/ML
2 INJECTION, SOLUTION INTRAMUSCULAR; INTRAVENOUS EVERY 6 HOURS PRN
Status: DISCONTINUED | OUTPATIENT
Start: 2018-09-20 | End: 2018-09-22

## 2018-09-20 RX ORDER — ATORVASTATIN CALCIUM 40 MG/1
40 TABLET, FILM COATED ORAL DAILY
Status: DISCONTINUED | OUTPATIENT
Start: 2018-09-20 | End: 2018-09-22

## 2018-09-20 RX ORDER — ASPIRIN 81 MG/1
81 TABLET ORAL EVERY MORNING
Status: DISCONTINUED | OUTPATIENT
Start: 2018-09-20 | End: 2018-09-22

## 2018-09-20 RX ORDER — MULTIVITAMIN WITH IRON
300 TABLET ORAL DAILY
Status: DISCONTINUED | OUTPATIENT
Start: 2018-09-20 | End: 2018-09-20 | Stop reason: RX

## 2018-09-20 RX ORDER — DOXEPIN HYDROCHLORIDE 50 MG/1
1 CAPSULE ORAL DAILY
Status: DISCONTINUED | OUTPATIENT
Start: 2018-09-20 | End: 2018-09-22

## 2018-09-20 RX ORDER — ALFUZOSIN HYDROCHLORIDE 10 MG/1
10 TABLET, EXTENDED RELEASE ORAL
Status: DISCONTINUED | OUTPATIENT
Start: 2018-09-20 | End: 2018-09-22

## 2018-09-20 NOTE — ED NOTES
Per lab, \"Dark yellow color which interferes with the microscopic part of the urine assessment\".  ermd notified

## 2018-09-20 NOTE — H&P
[facility]    History and Physical    Viktoriya Linn Patient Status:  Observation    1940 MRN [id number]   Location [facility] 5SW/SE Attending Arturo Griffin MD   Hosp Day # 0 PCP Jenae Cross MD     Date:   CYSTOSCOPY URETEROSCOPY;  Left      Comment:  Performed by Stanley Maloney MD at [address] [facility] MAIN OR  2005: [address]; Right  2001: OTHER SURGICAL HISTORY      Comment:  radical prostatectomy  Family History   Problem Relation Age of Onset   • Heart At Constitutional: Negative. HENT: Negative. Eyes: Negative. Respiratory: Negative. Cardiovascular: Negative. Gastrointestinal: Negative. Endocrine: Negative. Genitourinary: Positive for flank pain. Skin: Negative.     Allergic/Immun 3. Large hiatal hernia. 4. Hepatic steatosis. The preliminary report was provided by [facility] and there is no significant discrepancy.   Dictated by (CST): Erika Stafford MD on 9/20/2018 at 6:48     Approved by (CST): Erika Stafford MD on 9/

## 2018-09-20 NOTE — PLAN OF CARE
Problem: Patient Centered Care  Goal: Patient preferences are identified and integrated in the patient's plan of care  Interventions:  - What would you like us to know as we care for you?   - Provide timely, complete, and accurate information to patient/fa Problem: METABOLIC/FLUID AND ELECTROLYTES - ADULT  Goal: Glucose maintained within prescribed range  INTERVENTIONS:  - Monitor Blood Glucose as ordered  - Assess for signs and symptoms of hyperglycemia and hypoglycemia  - Administer ordered medications

## 2018-09-20 NOTE — PLAN OF CARE
Problem: Diabetes/Glucose Control  Goal: Glucose maintained within prescribed range  INTERVENTIONS:  - Monitor Blood Glucose as ordered  - Assess for signs and symptoms of hyperglycemia and hypoglycemia  - Administer ordered medications to maintain glucose goal  INTERVENTIONS:  - Encourage pt to monitor pain and request assistance  - Assess pain using appropriate pain scale  - Administer analgesics based on type and severity of pain and evaluate response  - Implement non-pharmacological measures as appropria

## 2018-09-20 NOTE — ED INITIAL ASSESSMENT (HPI)
Pt states has procedure with Dr. Yury Vinson on stent placed in Lt ureter on Last Thursday. 10:45pm began having rt flank/back pain and nausea.

## 2018-09-20 NOTE — CM/SW NOTE
09/20/18 1300   CM/SW Screening   Referral Source Nurse;Case [facility] staff; Chart review;Nursing rounds   Patient's Mental Status Alert;Oriented   Patient's [address]   Patient lives with Spouse   Patient Status Jorge

## 2018-09-20 NOTE — ED NOTES
Received pt from triage. Pt here with c/o rt flank pain. Pt with recent urology procedure and had stent placed on lt side last week. Pt states his pain began tonight with difficulty passing urine. Pt states last \"normal\" void was approx 2 days ago.  Pt wi

## 2018-09-21 LAB
25(OH)D3 SERPL-MCNC: 40.9 NG/ML (ref 30–100)
ANION GAP SERPL CALC-SCNC: 9 MMOL/L (ref 0–18)
BUN SERPL-MCNC: 23 MG/DL (ref 8–20)
BUN/CREAT SERPL: 11.3 (ref 10–20)
CALCIUM SERPL-MCNC: 8.2 MG/DL (ref 8.5–10.5)
CHLORIDE SERPL-SCNC: 108 MMOL/L (ref 95–110)
CO2 SERPL-SCNC: 21 MMOL/L (ref 22–32)
CREAT SERPL-MCNC: 2.04 MG/DL (ref 0.5–1.5)
GLUCOSE BLDC GLUCOMTR-MCNC: 160 MG/DL (ref 70–99)
GLUCOSE BLDC GLUCOMTR-MCNC: 170 MG/DL (ref 70–99)
GLUCOSE BLDC GLUCOMTR-MCNC: 192 MG/DL (ref 70–99)
GLUCOSE BLDC GLUCOMTR-MCNC: 192 MG/DL (ref 70–99)
GLUCOSE SERPL-MCNC: 191 MG/DL (ref 70–99)
HBA1C MFR BLD: 7.1 % (ref 4–6)
OSMOLALITY UR CALC.SUM OF ELEC: 295 MOSM/KG (ref 275–295)
POTASSIUM SERPL-SCNC: 4.2 MMOL/L (ref 3.3–5.1)
SODIUM SERPL-SCNC: 138 MMOL/L (ref 136–144)

## 2018-09-21 PROCEDURE — 99232 SBSQ HOSP IP/OBS MODERATE 35: CPT | Performed by: INTERNAL MEDICINE

## 2018-09-21 PROCEDURE — 99213 OFFICE O/P EST LOW 20 MIN: CPT | Performed by: UROLOGY

## 2018-09-21 NOTE — PLAN OF CARE
Problem: Patient Centered Care  Goal: Patient preferences are identified and integrated in the patient's plan of care  Interventions:  - What would you like us to know as we care for you?   - Provide timely, complete, and accurate information to patient/fa METABOLIC/FLUID AND ELECTROLYTES - ADULT  Goal: Glucose maintained within prescribed range  INTERVENTIONS:  - Monitor Blood Glucose as ordered  - Assess for signs and symptoms of hyperglycemia and hypoglycemia  - Administer ordered medications to maintain

## 2018-09-21 NOTE — CONSULTS
[facility]    Report of Consultation    Yaniquezach Renterialer Patient Status:  Observation    1940 MRN [id number]   Location [facility] 5SW/SE Attending Jose Matthews., MD   Hosp Day # 0 PCP Raoul Lockett.  Sheree Morales MD     Date of A Shari Rowland MD at [address]  9/13/2018: [place]; Left      Comment:  Performed by Christina Hankins MD at [address] [place] [address] OR  9/13/2018: CYSTOSCOPY URETEROSCOPY;  Left      Comment:  Performed by Christina Hankins MD at [address] [place] [address] OR  2005: INGUINAL HER ONCE DAILY   ascorbic acid, VITAMIN C, 250 MG Oral Tab Take 500 mg by mouth daily. atorvastatin 40 MG Oral Tab TAKE ONE TABLET BY MOUTH ONCE DAILY   multivitamin Oral Tab Take 1 tablet by mouth daily.    Omega-3 Fatty Acids (FISH OIL CONCENTRATE OR) Take 09/04/2018    PSA 0.0 10/04/2017         Imaging:  Ct Abdomen+pelvis Kidneystone 2d Rndr(no Iv,no Oral)(cpt=74176)    Result Date: 9/20/2018  CONCLUSION:   1. Mild right hydroureteronephrosis related to a 2 mm calculus in the mid ureter.   2. Left ureteral

## 2018-09-21 NOTE — PLAN OF CARE
Problem: Patient Centered Care  Goal: Patient preferences are identified and integrated in the patient's plan of care  Interventions:  - What would you like us to know as we care for you?  I had a stent put in my left ureter and am due to have it taken out strategies to promote bladder emptying  Outcome: Progressing      Problem: METABOLIC/FLUID AND ELECTROLYTES - ADULT  Goal: Glucose maintained within prescribed range  INTERVENTIONS:  - Monitor Blood Glucose as ordered  - Assess for signs and symptoms of hy

## 2018-09-21 NOTE — PROGRESS NOTES
[facility] - [facility]    Progress Note    Callie Killian Patient Status:  Observation    1940 MRN [id number]   Location [facility] 5SW/SE Attending Carissa Pascual MD   Hosp Day # 0 PCP Serena Abrams.  Ronnie Blackwell MD        Subjective: 09/20/2018     (H) 09/20/2018    CA 8.7 09/20/2018    ALB 4.1 09/04/2018    ALKPHO 62 09/04/2018    BILT 0.5 09/04/2018    TP 6.5 09/04/2018    AST 28 09/04/2018    ALT 34 09/04/2018    PTT 29.1 05/30/2018    INR 0.9 09/04/2018    TSH 4.89 03/08/201

## 2018-09-21 NOTE — CONSULTS
[facility] - [facility]    Report of Consultation    Estefania Hearn Patient Status:  Observation    1940 MRN [id number]   Location [facility] 5SW/SE Attending Kimberley Jimenez MD   Hosp Day # 0 PCP Eloisa Cortez.  Steffen Werner MD     Date of A SURGICAL HISTORY      Comment:  radical prostatectomy    Family History  Family History   Problem Relation Age of Onset   • Heart Attack Father         CAD   • Cancer Father         lung   • Cancer Sister         colon       Social History  Social History Take 1 tablet by mouth daily. Omega-3 Fatty Acids (FISH OIL CONCENTRATE OR) Take 300 mg by mouth. aspirin (ASPIR-81) 81 MG Oral Tab EC Take 81 mg by mouth every morning.  take 1 tablet (81MG)  by ORAL route  every day   Blood Glucose Monitoring Suppl 1.61*  1.77*   --   2.04*   GFRAA  47*  42*   --   36*   GFRNAA  41*  36*   --   31*   CA  9.7  8.7   --   8.2*   NA  140  136   --   138   K  4.7  6.3*  5.7*  4.2   CL  105  108   --   108   CO2  25  22   --   21*        Imaging:                Impression

## 2018-09-22 ENCOUNTER — APPOINTMENT (OUTPATIENT)
Dept: ULTRASOUND IMAGING | Facility: HOSPITAL | Age: 78
DRG: 684 | End: 2018-09-22
Attending: UROLOGY
Payer: MEDICARE

## 2018-09-22 VITALS
OXYGEN SATURATION: 96 % | RESPIRATION RATE: 18 BRPM | HEIGHT: 68 IN | BODY MASS INDEX: 25.43 KG/M2 | WEIGHT: 167.81 LBS | DIASTOLIC BLOOD PRESSURE: 74 MMHG | SYSTOLIC BLOOD PRESSURE: 148 MMHG | TEMPERATURE: 99 F | HEART RATE: 82 BPM

## 2018-09-22 LAB
ALBUMIN SERPL BCP-MCNC: 2.7 G/DL (ref 3.5–4.8)
ANION GAP SERPL CALC-SCNC: 4 MMOL/L (ref 0–18)
BASOPHILS # BLD: 0.1 K/UL (ref 0–0.2)
BASOPHILS NFR BLD: 1 %
BUN SERPL-MCNC: 17 MG/DL (ref 8–20)
BUN/CREAT SERPL: 13.5 (ref 10–20)
CALCIUM SERPL-MCNC: 8 MG/DL (ref 8.5–10.5)
CHLORIDE SERPL-SCNC: 110 MMOL/L (ref 95–110)
CO2 SERPL-SCNC: 25 MMOL/L (ref 22–32)
CREAT SERPL-MCNC: 1.26 MG/DL (ref 0.5–1.5)
EOSINOPHIL # BLD: 0.3 K/UL (ref 0–0.7)
EOSINOPHIL NFR BLD: 6 %
ERYTHROCYTE [DISTWIDTH] IN BLOOD BY AUTOMATED COUNT: 13.4 % (ref 11–15)
GLUCOSE BLDC GLUCOMTR-MCNC: 143 MG/DL (ref 70–99)
GLUCOSE BLDC GLUCOMTR-MCNC: 189 MG/DL (ref 70–99)
GLUCOSE SERPL-MCNC: 168 MG/DL (ref 70–99)
HCT VFR BLD AUTO: 35.1 % (ref 41–52)
HGB BLD-MCNC: 11.7 G/DL (ref 13.5–17.5)
LYMPHOCYTES # BLD: 1.5 K/UL (ref 1–4)
LYMPHOCYTES NFR BLD: 24 %
MCH RBC QN AUTO: 31.9 PG (ref 27–32)
MCHC RBC AUTO-ENTMCNC: 33.5 G/DL (ref 32–37)
MCV RBC AUTO: 95.3 FL (ref 80–100)
MONOCYTES # BLD: 0.6 K/UL (ref 0–1)
MONOCYTES NFR BLD: 9 %
NEUTROPHILS # BLD AUTO: 3.8 K/UL (ref 1.8–7.7)
NEUTROPHILS NFR BLD: 61 %
OSMOLALITY UR CALC.SUM OF ELEC: 293 MOSM/KG (ref 275–295)
PHOSPHATE SERPL-MCNC: 2.7 MG/DL (ref 2.4–4.7)
PLATELET # BLD AUTO: 254 K/UL (ref 140–400)
PMV BLD AUTO: 7.2 FL (ref 7.4–10.3)
POTASSIUM SERPL-SCNC: 4 MMOL/L (ref 3.3–5.1)
RBC # BLD AUTO: 3.68 M/UL (ref 4.5–5.9)
SODIUM SERPL-SCNC: 139 MMOL/L (ref 136–144)
WBC # BLD AUTO: 6.3 K/UL (ref 4–11)

## 2018-09-22 PROCEDURE — 99213 OFFICE O/P EST LOW 20 MIN: CPT | Performed by: UROLOGY

## 2018-09-22 PROCEDURE — 99238 HOSP IP/OBS DSCHRG MGMT 30/<: CPT | Performed by: INTERNAL MEDICINE

## 2018-09-22 PROCEDURE — 76775 US EXAM ABDO BACK WALL LIM: CPT | Performed by: UROLOGY

## 2018-09-22 NOTE — PROGRESS NOTES
Lynda Jacques is a 69year old male.     HPI:   Patient presents with:  Postop/Procedure Problem      History provided by patient, nurse Laura Loza, review of hospital records    History of left flank pain shown to be left hydronephrosis beginning of Septem X-RAY 1 VW      Comment:  chest xray negative  No date: CHOLECYSTECTOMY  9/13/2018: CYSTOSCOPY RETROGRADE; Left      Comment:  Performed by Zak Nuñez MD at [address]  9/13/2018: [place];  Left      Comment:  Performed by Luis Miguel Hill Oral, Daily with breakfast    Allergies:  No Known Allergies      ROS:   General review of systems -- please see HPI section above. Denies chest pain; shortness of breath. No bowel movement for 2 days but patient does not feel that he is constipated. SPECGRAVITY  1.025  1.015  1.023  1.024  1.032  >1.040*  1.015  1.024  1.026  1.020   PHURINE  5.0  5.0  5.0  5.0  5.0  5.0  5.5  5.0  5.0   --    PROUR   --    --   Negative  Negative  Negative  Negative   --   Negative  Negative   --    GLUUR  neg  neg [facility], CT ABDOMEN + PELVIS KIDNEYSTONE 2D RNDR (NO IV NO ORAL) (CPT=741, 9/20/2018, 1:39. [address] (NGP=[id number]), 6/06/2018, 13:20.   INDICATIONS: Hydronephrosis  TECHNIQUE: Ultrasound examination was Date: 9/20/2018  PROCEDURE:   CT ABDOMEN + PELVIS KIDNEYSTONE 2D RNDR (NO IV NO ORAL) (ATY=71196)  COMPARISON: [facility], XR RETROGRADE PYELOGRAM (THQ=17722), 9/13/2018, 14:22.   [facility], CT UROGRAM (W+WO)  (QTB=32661), Distal end of left ureteral stent present. There are a few foci of intraluminal gas. PELVIC ORGANS: No visible mass. The multiple surgical clips present throughout the pelvis. Prior prostatectomy. Bilateral fat containing inguinal hernias.  BONES: Multile 9/22/18 renal ultrasound shows no hydronephrosis. I explained treatment options and patient understands and wants to be observed. We will follow with Dr. Betty Mtz.     2.  Left ureteral strictures  Dilated ureteroscopically Doctor Betty Mtz 9/18/18; patient wyatt

## 2018-09-22 NOTE — PROGRESS NOTES
[facility]    Progress Note    Rosi Vila Patient Status:  Observation    1940 MRN [id number]   Location [facility] 5SW/SE Attending Vinicio Toledo MD   Hosp Day # 0 PCP Nata Chan.  Ildefonso Rojo MD       Subjective:   Ru preliminary report was provided by [facility] and there is no significant discrepancy.   Dictated by (CST): Gisell Humphrey MD on 9/20/2018 at 6:48     Approved by (CST): Gisell Humphrey MD on 9/20/2018 at 6:56                Krys Jenkins MD  9/

## 2018-09-22 NOTE — PLAN OF CARE
Problem: Patient Centered Care  Goal: Patient preferences are identified and integrated in the patient's plan of care  Interventions:  - What would you like us to know as we care for you?  I had a stent put in my left ureter and am due to have it taken out METABOLIC/FLUID AND ELECTROLYTES - ADULT  Goal: Glucose maintained within prescribed range  INTERVENTIONS:  - Monitor Blood Glucose as ordered  - Assess for signs and symptoms of hyperglycemia and hypoglycemia  - Administer ordered medications to maintain

## 2018-09-22 NOTE — DISCHARGE SUMMARY
DISCHARGE SUMMARY     Chriss Guerra Patient Status:  Observation    1940 MRN [id number]   Location [facility] 5SW/SE Attending Nery Diaz MD   Hosp Day # 0 PCP Arlette Hodge.  Sahara Zamora MD     Date of Admission: 2018  Date of Disch Tabs  Commonly known as:  GLUCOPHAGE      TAKE 1 TABLET BY MOUTH TWICE DAILY   Quantity:  180 tablet  Refills:  0     multivitamin Tabs      Take 1 tablet by mouth daily.    Refills:  0     ONETOUCH LANCETS Misc      Testing twice a day   Diagnosis E11.65 BLUE) In Vitro Strip      !! - Potential duplicate medications found. Please discuss with provider.               Discharge Diet: As tolerated and Advised to make sure he pushes plenty of fluids particularly water    Discharge Activity: As tolerated    Foll

## 2018-09-22 NOTE — PLAN OF CARE
Problem: Patient Centered Care  Goal: Patient preferences are identified and integrated in the patient's plan of care  Interventions:  - What would you like us to know as we care for you?  I had a stent put in my left ureter and am due to have it taken out patient's medication profile  - Implement strategies to promote bladder emptying  Outcome: Adequate for Discharge      Problem: METABOLIC/FLUID AND ELECTROLYTES - ADULT  Goal: Glucose maintained within prescribed range  INTERVENTIONS:  - Monitor Blood Gluc

## 2018-09-24 ENCOUNTER — TELEPHONE (OUTPATIENT)
Dept: SURGERY | Facility: CLINIC | Age: 78
End: 2018-09-24

## 2018-09-24 NOTE — TELEPHONE ENCOUNTER
Pt seen in ED on 9/20 for Kidney stone. Pt calling to schedule follow up appointment. Pt does have apt schedule for 9/28 for stent removal. Please advise. Thank you.

## 2018-09-28 ENCOUNTER — OFFICE VISIT (OUTPATIENT)
Dept: SURGERY | Facility: CLINIC | Age: 78
End: 2018-09-28
Payer: MEDICARE

## 2018-09-28 VITALS
BODY MASS INDEX: 28.25 KG/M2 | TEMPERATURE: 98 F | SYSTOLIC BLOOD PRESSURE: 142 MMHG | HEIGHT: 67 IN | DIASTOLIC BLOOD PRESSURE: 81 MMHG | HEART RATE: 93 BPM | WEIGHT: 180 LBS

## 2018-09-28 DIAGNOSIS — N13.4 HYDROURETER ON LEFT: Primary | ICD-10-CM

## 2018-09-28 PROCEDURE — 52310 CYSTOSCOPY AND TREATMENT: CPT | Performed by: UROLOGY

## 2018-09-28 RX ORDER — CIPROFLOXACIN 500 MG/1
500 TABLET, FILM COATED ORAL ONCE
Status: COMPLETED | OUTPATIENT
Start: 2018-09-28 | End: 2018-09-28

## 2018-09-28 RX ADMIN — CIPROFLOXACIN 500 MG: 500 TABLET, FILM COATED ORAL at 15:36:00

## 2018-09-28 NOTE — PROGRESS NOTES
Carlos Barbour is a 69year old male. HPI:   Patient presents with:   Follow - Up: Cystoscopy with left stent removal with BIJU      79-year-old male presents for cystoscopy and left ureteral stent removal.  The patient is status post cystoscopy, left History   Problem Relation Age of Onset   • Heart Attack Father         CAD   • Cancer Father         lung   • Cancer Sister         colon      Social History: Social History    Tobacco Use      Smoking status: Former Smoker        Packs/day: 1.00        T Allergies:  No Known Allergies      ROS:       PHYSICAL EXAM:   Arthur Tinoco  : 1940  Referring Physician: No ref. provider found     Patient presents with:   Follow - Up: Cystoscopy with left stent removal with KHB          CYSTOSCOPY

## 2018-09-29 ENCOUNTER — IMMUNIZATION (OUTPATIENT)
Dept: INTERNAL MEDICINE CLINIC | Facility: CLINIC | Age: 78
End: 2018-09-29
Payer: MEDICARE

## 2018-09-29 ENCOUNTER — MED REC SCAN ONLY (OUTPATIENT)
Dept: INTERNAL MEDICINE CLINIC | Facility: CLINIC | Age: 78
End: 2018-09-29

## 2018-09-29 PROCEDURE — G0008 ADMIN INFLUENZA VIRUS VAC: HCPCS | Performed by: INTERNAL MEDICINE

## 2018-09-29 PROCEDURE — 90653 IIV ADJUVANT VACCINE IM: CPT | Performed by: INTERNAL MEDICINE

## 2018-09-29 RX ORDER — LISINOPRIL 5 MG/1
TABLET ORAL
Qty: 90 TABLET | Refills: 0 | Status: SHIPPED | OUTPATIENT
Start: 2018-09-29 | End: 2019-01-05

## 2018-10-09 ENCOUNTER — TELEPHONE (OUTPATIENT)
Dept: INTERNAL MEDICINE CLINIC | Facility: CLINIC | Age: 78
End: 2018-10-09

## 2018-10-09 ENCOUNTER — OFFICE VISIT (OUTPATIENT)
Dept: INTERNAL MEDICINE CLINIC | Facility: CLINIC | Age: 78
End: 2018-10-09
Payer: MEDICARE

## 2018-10-09 VITALS
SYSTOLIC BLOOD PRESSURE: 138 MMHG | HEIGHT: 67 IN | RESPIRATION RATE: 16 BRPM | TEMPERATURE: 98 F | DIASTOLIC BLOOD PRESSURE: 56 MMHG | HEART RATE: 82 BPM | OXYGEN SATURATION: 98 % | BODY MASS INDEX: 28.88 KG/M2 | WEIGHT: 184 LBS

## 2018-10-09 DIAGNOSIS — R10.11 RIGHT UPPER QUADRANT ABDOMINAL PAIN: Primary | ICD-10-CM

## 2018-10-09 DIAGNOSIS — IMO0001 UNCONTROLLED TYPE 2 DIABETES MELLITUS WITHOUT COMPLICATION, WITHOUT LONG-TERM CURRENT USE OF INSULIN: ICD-10-CM

## 2018-10-09 PROCEDURE — 81003 URINALYSIS AUTO W/O SCOPE: CPT | Performed by: INTERNAL MEDICINE

## 2018-10-09 PROCEDURE — 99214 OFFICE O/P EST MOD 30 MIN: CPT | Performed by: INTERNAL MEDICINE

## 2018-10-09 NOTE — PATIENT INSTRUCTIONS
ASSESSMENT/PLAN:   Right upper quadrant abdominal pain  (primary encounter diagnosis) Try capascaium or tiger balm 2 times  A day for 7 days. Check urine.      Uncontrolled type 2 diabetes mellitus without complication, without long-term current use of insu each other. This can cause the foramen or the spinal canal to narrow (called stenosis) and press against a nerve. Date Last Reviewed: 3/1/2018  © 7846-6256 The [employer] [address]. [address] [facility] [place], [facility] [place]. All rights reserved.  This inf back. Don’t try to lift more than you can handle. · When sitting, keep your lower back supported. Use a rolled-up towel as needed. When to call your healthcare provider  Seek medical care right away if:  · You can't stand or walk.   · You have a temperatu medicines only as directed. ? Heat  After the first 48 hours, heat can relax sore muscles and improve blood flow. · Try a warm bath or shower. Or use a heating pad set on low. To prevent a burn, keep a cloth between you and the heating pad.   · Don’t use and thigh muscles stretched and strong. This gives your back better support. Be sure to walk with your spine’s three curves aligned, by keeping your head, hips, and toes connected by a vertical line.    Date Last Reviewed: 10/18/2015  © 8604-1201 The [employer]. Always follow your healthcare professional's instructions. Relieving Tension in Your Back  Being relaxed helps keep your mind healthy and your back ready to move. Take short breaks often. Walk around. Stretch. Switch tasks.  Also give the follo side.  · Repeat 10 times. Date Last Reviewed: 3/1/2018  © 7307-9773 [employer]. 1407 [facility] [place] [place]. All rights reserved. This information is not intended as a substitute for professional medical care.  Always follow yo let your back sag. Be sure to keep your weight evenly distributed. Don’t sit back on your hips.   · Hold for 5 seconds. · Return to starting position. · Tuck your head and lift (arch) your back. · Hold for 5 seconds  · Return to starting position.   · Re

## 2018-10-09 NOTE — PROGRESS NOTES
HPI:    Patient ID: Jennie Reece is a 69year old male. Picked up pot of dirt and sharp pain R side abdomen. More when bends 10 days. In and out hospital due to kidney stones. Stent L kidney. Never figured out what type of stones.  Sees  Component Value Date/Time     (H) 09/22/2018 06:09 AM     09/22/2018 06:09 AM    K 4.0 09/22/2018 06:09 AM     09/22/2018 06:09 AM    CO2 25 09/22/2018 06:09 AM    CREATSERUM 1.26 09/22/2018 06:09 AM    CA 8.0 (L) 09/22/2018 06:09 AM All other systems reviewed and are negative.           Current Outpatient Medications:  LISINOPRIL 5 MG Oral Tab TAKE 1 TABLET BY MOUTH ONCE DAILY Disp: 90 tablet Rfl: 0   METFORMIN HCL 1000 MG Oral Tab TAKE 1 TABLET BY MOUTH TWICE DAILY Disp: 180 tablet Rf • Type II or unspecified type diabetes mellitus without mention of complication, not stated as uncontrolled       Past Surgical History:   Procedure Laterality Date   • APPENDECTOMY     • CHEST X-RAY 1 VW      chest xray negative   • CHOLECYSTECTOMY     • Pulmonary/Chest: Effort normal and breath sounds normal. No tachypnea and no bradypnea. No respiratory distress. He has no wheezes. He has no rales. Abdominal: Soft. Bowel sounds are normal. He exhibits no distension. There is no tenderness.  There is no Uncontrolled type 2 diabetes mellitus without complication, without long-term current use of insulin (hcc) Slightly higher but concerns when drops. Hold new meds. Or changes. . Careful with diet and excercise at least 30 minutes 3-4 times a week.  Check suga

## 2018-10-10 ENCOUNTER — TELEPHONE (OUTPATIENT)
Dept: INTERNAL MEDICINE CLINIC | Facility: CLINIC | Age: 78
End: 2018-10-10

## 2018-10-10 PROBLEM — R10.32 ABDOMINAL PAIN, LEFT LOWER QUADRANT: Status: RESOLVED | Noted: 2018-05-29 | Resolved: 2018-10-10

## 2018-10-10 NOTE — TELEPHONE ENCOUNTER
Pt will gave sx by the end of October and EKG says \"in process\". Lee Caba from pre admission wants to know if it can be finalize and sent to them. EKG print out placed on Dr. Arnold Szymanski desk at [facility]  for review.

## 2018-10-16 ENCOUNTER — APPOINTMENT (OUTPATIENT)
Dept: LAB | Age: 78
End: 2018-10-16
Attending: ORTHOPAEDIC SURGERY
Payer: MEDICARE

## 2018-10-16 DIAGNOSIS — Z01.818 PREOP TESTING: ICD-10-CM

## 2018-10-16 PROCEDURE — 87641 MR-STAPH DNA AMP PROBE: CPT

## 2018-10-24 NOTE — H&P
[place] [facility] - [facility]    History and Physical    Radha Nelson Patient Status:  Hospital Outpatient Surgery    1940 MRN [id number]   Location [place] [address] Attending Trae Doty MD   Hosp Day # 0 PCP Orestes Presdelores SURGICAL HISTORY  2001    radical prostatectomy     Family History   Problem Relation Age of Onset   • Heart Attack Father         CAD   • Cancer Father         lung   • Cancer Sister         colon   • Other (Other) Mother         alzheimers     Social His Dr. Barby Dolan. Risks, benefits, and alternative treatments discussed with patient. Patient understands and elects to proceed with surgery. Consent Signed.       Francisco Wen PA-C  10/24/2018

## 2018-10-25 ENCOUNTER — HOSPITAL ENCOUNTER (OUTPATIENT)
Facility: HOSPITAL | Age: 78
Discharge: HOME | End: 2018-10-25
Attending: ORTHOPAEDIC SURGERY | Admitting: ORTHOPAEDIC SURGERY
Payer: MEDICARE

## 2018-10-25 ENCOUNTER — ANESTHESIA (OUTPATIENT)
Dept: SURGERY | Facility: HOSPITAL | Age: 78
End: 2018-10-25
Payer: MEDICARE

## 2018-10-25 ENCOUNTER — ANESTHESIA EVENT (OUTPATIENT)
Dept: SURGERY | Facility: HOSPITAL | Age: 78
End: 2018-10-25
Payer: MEDICARE

## 2018-10-25 ENCOUNTER — APPOINTMENT (OUTPATIENT)
Dept: GENERAL RADIOLOGY | Facility: HOSPITAL | Age: 78
End: 2018-10-25
Attending: ORTHOPAEDIC SURGERY
Payer: MEDICARE

## 2018-10-25 VITALS
OXYGEN SATURATION: 97 % | BODY MASS INDEX: 27.43 KG/M2 | HEIGHT: 68 IN | TEMPERATURE: 98 F | SYSTOLIC BLOOD PRESSURE: 130 MMHG | WEIGHT: 181 LBS | DIASTOLIC BLOOD PRESSURE: 81 MMHG | RESPIRATION RATE: 16 BRPM | HEART RATE: 65 BPM

## 2018-10-25 DIAGNOSIS — E11.65 UNCONTROLLED TYPE 2 DIABETES MELLITUS WITH HYPERGLYCEMIA (HCC): Chronic | ICD-10-CM

## 2018-10-25 DIAGNOSIS — Z01.818 PREOP TESTING: Primary | ICD-10-CM

## 2018-10-25 PROBLEM — M48.062 LUMBAR STENOSIS WITH NEUROGENIC CLAUDICATION: Status: ACTIVE | Noted: 2018-10-25

## 2018-10-25 PROCEDURE — 76001 XR C-ARM FLUORO >1 HOUR  (CPT=76001): CPT | Performed by: ORTHOPAEDIC SURGERY

## 2018-10-25 PROCEDURE — 85027 COMPLETE CBC AUTOMATED: CPT | Performed by: ORTHOPAEDIC SURGERY

## 2018-10-25 PROCEDURE — 01NB0ZZ RELEASE LUMBAR NERVE, OPEN APPROACH: ICD-10-PCS | Performed by: ORTHOPAEDIC SURGERY

## 2018-10-25 PROCEDURE — 82962 GLUCOSE BLOOD TEST: CPT

## 2018-10-25 PROCEDURE — 95937 NEUROMUSCULAR JUNCTION TEST: CPT | Performed by: ORTHOPAEDIC SURGERY

## 2018-10-25 PROCEDURE — 4A0FX3Z MEASUREMENT OF MUSCULOSKELETAL CONTRACTILITY, EXTERNAL APPROACH: ICD-10-PCS | Performed by: ORTHOPAEDIC SURGERY

## 2018-10-25 PROCEDURE — 95938 SOMATOSENSORY TESTING: CPT | Performed by: ORTHOPAEDIC SURGERY

## 2018-10-25 PROCEDURE — 4A11X4G MONITORING OF PERIPHERAL NERVOUS ELECTRICAL ACTIVITY, INTRAOPERATIVE, EXTERNAL APPROACH: ICD-10-PCS | Performed by: ORTHOPAEDIC SURGERY

## 2018-10-25 RX ORDER — ONDANSETRON 2 MG/ML
INJECTION INTRAMUSCULAR; INTRAVENOUS AS NEEDED
Status: DISCONTINUED | OUTPATIENT
Start: 2018-10-25 | End: 2018-10-25 | Stop reason: SURG

## 2018-10-25 RX ORDER — LISINOPRIL 5 MG/1
5 TABLET ORAL DAILY
Status: DISCONTINUED | OUTPATIENT
Start: 2018-10-25 | End: 2018-10-25

## 2018-10-25 RX ORDER — METOCLOPRAMIDE HYDROCHLORIDE 5 MG/ML
10 INJECTION INTRAMUSCULAR; INTRAVENOUS EVERY 6 HOURS PRN
Status: DISCONTINUED | OUTPATIENT
Start: 2018-10-25 | End: 2018-10-25

## 2018-10-25 RX ORDER — ONDANSETRON 2 MG/ML
4 INJECTION INTRAMUSCULAR; INTRAVENOUS EVERY 4 HOURS PRN
Status: DISCONTINUED | OUTPATIENT
Start: 2018-10-25 | End: 2018-10-25

## 2018-10-25 RX ORDER — FAMOTIDINE 20 MG/1
20 TABLET ORAL ONCE
Status: DISCONTINUED | OUTPATIENT
Start: 2018-10-25 | End: 2018-10-25 | Stop reason: HOSPADM

## 2018-10-25 RX ORDER — HYDROCODONE BITARTRATE AND ACETAMINOPHEN 5; 325 MG/1; MG/1
1 TABLET ORAL EVERY 6 HOURS PRN
Qty: 30 TABLET | Refills: 0 | Status: SHIPPED | OUTPATIENT
Start: 2018-10-25

## 2018-10-25 RX ORDER — IBUPROFEN 600 MG/1
600 TABLET ORAL EVERY 6 HOURS PRN
Status: DISCONTINUED | OUTPATIENT
Start: 2018-10-25 | End: 2018-10-25

## 2018-10-25 RX ORDER — SODIUM CHLORIDE 9 MG/ML
INJECTION, SOLUTION INTRAVENOUS CONTINUOUS
Status: DISCONTINUED | OUTPATIENT
Start: 2018-10-25 | End: 2018-10-25

## 2018-10-25 RX ORDER — MORPHINE SULFATE 4 MG/ML
2 INJECTION, SOLUTION INTRAMUSCULAR; INTRAVENOUS EVERY 10 MIN PRN
Status: DISCONTINUED | OUTPATIENT
Start: 2018-10-25 | End: 2018-10-25 | Stop reason: HOSPADM

## 2018-10-25 RX ORDER — ONDANSETRON 2 MG/ML
4 INJECTION INTRAMUSCULAR; INTRAVENOUS ONCE AS NEEDED
Status: DISCONTINUED | OUTPATIENT
Start: 2018-10-25 | End: 2018-10-25 | Stop reason: HOSPADM

## 2018-10-25 RX ORDER — MORPHINE SULFATE 10 MG/ML
6 INJECTION, SOLUTION INTRAMUSCULAR; INTRAVENOUS EVERY 10 MIN PRN
Status: DISCONTINUED | OUTPATIENT
Start: 2018-10-25 | End: 2018-10-25 | Stop reason: HOSPADM

## 2018-10-25 RX ORDER — ACETAMINOPHEN 500 MG
1000 TABLET ORAL ONCE
Status: COMPLETED | OUTPATIENT
Start: 2018-10-25 | End: 2018-10-25

## 2018-10-25 RX ORDER — DEXAMETHASONE SODIUM PHOSPHATE 4 MG/ML
VIAL (ML) INJECTION AS NEEDED
Status: DISCONTINUED | OUTPATIENT
Start: 2018-10-25 | End: 2018-10-25 | Stop reason: SURG

## 2018-10-25 RX ORDER — LISINOPRIL 5 MG/1
5 TABLET ORAL DAILY
Refills: 0 | Status: SHIPPED | COMMUNITY
Start: 2018-10-26 | End: 2018-10-25

## 2018-10-25 RX ORDER — BACITRACIN 50000 [USP'U]/1
INJECTION, POWDER, LYOPHILIZED, FOR SOLUTION INTRAMUSCULAR AS NEEDED
Status: DISCONTINUED | OUTPATIENT
Start: 2018-10-25 | End: 2018-10-25 | Stop reason: HOSPADM

## 2018-10-25 RX ORDER — CYANOCOBALAMIN (VITAMIN B-12) 1000 MCG
1 TABLET, EXTENDED RELEASE ORAL 2 TIMES DAILY WITH MEALS
Qty: 60 TABLET | Refills: 0 | Status: SHIPPED | OUTPATIENT
Start: 2018-10-25

## 2018-10-25 RX ORDER — HYDROCODONE BITARTRATE AND ACETAMINOPHEN 5; 325 MG/1; MG/1
1 TABLET ORAL AS NEEDED
Status: DISCONTINUED | OUTPATIENT
Start: 2018-10-25 | End: 2018-10-25 | Stop reason: HOSPADM

## 2018-10-25 RX ORDER — DOCUSATE SODIUM 100 MG/1
100 CAPSULE, LIQUID FILLED ORAL 2 TIMES DAILY
Status: DISCONTINUED | OUTPATIENT
Start: 2018-10-25 | End: 2018-10-25

## 2018-10-25 RX ORDER — NALOXONE HYDROCHLORIDE 0.4 MG/ML
80 INJECTION, SOLUTION INTRAMUSCULAR; INTRAVENOUS; SUBCUTANEOUS AS NEEDED
Status: DISCONTINUED | OUTPATIENT
Start: 2018-10-25 | End: 2018-10-25 | Stop reason: HOSPADM

## 2018-10-25 RX ORDER — EPHEDRINE SULFATE 50 MG/ML
INJECTION, SOLUTION INTRAVENOUS AS NEEDED
Status: DISCONTINUED | OUTPATIENT
Start: 2018-10-25 | End: 2018-10-25 | Stop reason: SURG

## 2018-10-25 RX ORDER — ATORVASTATIN CALCIUM 40 MG/1
40 TABLET, FILM COATED ORAL DAILY
Status: DISCONTINUED | OUTPATIENT
Start: 2018-10-26 | End: 2018-10-25

## 2018-10-25 RX ORDER — SODIUM CHLORIDE, SODIUM LACTATE, POTASSIUM CHLORIDE, CALCIUM CHLORIDE 600; 310; 30; 20 MG/100ML; MG/100ML; MG/100ML; MG/100ML
INJECTION, SOLUTION INTRAVENOUS CONTINUOUS
Status: DISCONTINUED | OUTPATIENT
Start: 2018-10-25 | End: 2018-10-25 | Stop reason: HOSPADM

## 2018-10-25 RX ORDER — BUPIVACAINE HYDROCHLORIDE AND EPINEPHRINE 5; 5 MG/ML; UG/ML
INJECTION, SOLUTION PERINEURAL AS NEEDED
Status: DISCONTINUED | OUTPATIENT
Start: 2018-10-25 | End: 2018-10-25 | Stop reason: HOSPADM

## 2018-10-25 RX ORDER — POLYETHYLENE GLYCOL 3350 17 G/17G
17 POWDER, FOR SOLUTION ORAL DAILY PRN
Status: DISCONTINUED | OUTPATIENT
Start: 2018-10-25 | End: 2018-10-25

## 2018-10-25 RX ORDER — ATORVASTATIN CALCIUM 40 MG/1
40 TABLET, FILM COATED ORAL DAILY
Refills: 0 | Status: SHIPPED | COMMUNITY
Start: 2018-10-26 | End: 2018-10-25

## 2018-10-25 RX ORDER — CYANOCOBALAMIN (VITAMIN B-12) 1000 MCG
1 TABLET, EXTENDED RELEASE ORAL 2 TIMES DAILY WITH MEALS
Status: DISCONTINUED | OUTPATIENT
Start: 2018-10-25 | End: 2018-10-25

## 2018-10-25 RX ORDER — SENNOSIDES 8.6 MG
17.2 TABLET ORAL NIGHTLY
Status: DISCONTINUED | OUTPATIENT
Start: 2018-10-25 | End: 2018-10-25

## 2018-10-25 RX ORDER — HALOPERIDOL 5 MG/ML
0.25 INJECTION INTRAMUSCULAR ONCE AS NEEDED
Status: DISCONTINUED | OUTPATIENT
Start: 2018-10-25 | End: 2018-10-25 | Stop reason: HOSPADM

## 2018-10-25 RX ORDER — DIPHENHYDRAMINE HCL 25 MG
25 CAPSULE ORAL EVERY 4 HOURS PRN
Status: DISCONTINUED | OUTPATIENT
Start: 2018-10-25 | End: 2018-10-25

## 2018-10-25 RX ORDER — DOXEPIN HYDROCHLORIDE 50 MG/1
1 CAPSULE ORAL DAILY
Status: DISCONTINUED | OUTPATIENT
Start: 2018-10-26 | End: 2018-10-25

## 2018-10-25 RX ORDER — ASPIRIN 81 MG/1
81 TABLET ORAL EVERY MORNING
Status: DISCONTINUED | OUTPATIENT
Start: 2018-10-26 | End: 2018-10-25

## 2018-10-25 RX ORDER — MIDAZOLAM HYDROCHLORIDE 1 MG/ML
INJECTION INTRAMUSCULAR; INTRAVENOUS AS NEEDED
Status: DISCONTINUED | OUTPATIENT
Start: 2018-10-25 | End: 2018-10-25 | Stop reason: SURG

## 2018-10-25 RX ORDER — ROCURONIUM BROMIDE 10 MG/ML
INJECTION, SOLUTION INTRAVENOUS AS NEEDED
Status: DISCONTINUED | OUTPATIENT
Start: 2018-10-25 | End: 2018-10-25 | Stop reason: SURG

## 2018-10-25 RX ORDER — MORPHINE SULFATE 4 MG/ML
4 INJECTION, SOLUTION INTRAMUSCULAR; INTRAVENOUS EVERY 10 MIN PRN
Status: DISCONTINUED | OUTPATIENT
Start: 2018-10-25 | End: 2018-10-25 | Stop reason: HOSPADM

## 2018-10-25 RX ORDER — HYDROCODONE BITARTRATE AND ACETAMINOPHEN 5; 325 MG/1; MG/1
2 TABLET ORAL AS NEEDED
Status: DISCONTINUED | OUTPATIENT
Start: 2018-10-25 | End: 2018-10-25 | Stop reason: HOSPADM

## 2018-10-25 RX ORDER — HYDROCODONE BITARTRATE AND ACETAMINOPHEN 5; 325 MG/1; MG/1
1 TABLET ORAL EVERY 6 HOURS PRN
Status: DISCONTINUED | OUTPATIENT
Start: 2018-10-25 | End: 2018-10-25

## 2018-10-25 RX ORDER — SODIUM PHOSPHATE, DIBASIC AND SODIUM PHOSPHATE, MONOBASIC 7; 19 G/133ML; G/133ML
1 ENEMA RECTAL ONCE AS NEEDED
Status: DISCONTINUED | OUTPATIENT
Start: 2018-10-25 | End: 2018-10-25

## 2018-10-25 RX ORDER — PHENYLEPHRINE HCL 10 MG/ML
VIAL (ML) INJECTION AS NEEDED
Status: DISCONTINUED | OUTPATIENT
Start: 2018-10-25 | End: 2018-10-25 | Stop reason: SURG

## 2018-10-25 RX ORDER — CEFAZOLIN SODIUM/WATER 2 G/20 ML
2 SYRINGE (ML) INTRAVENOUS ONCE
Status: COMPLETED | OUTPATIENT
Start: 2018-10-25 | End: 2018-10-25

## 2018-10-25 RX ORDER — CEFAZOLIN SODIUM/WATER 2 G/20 ML
2 SYRINGE (ML) INTRAVENOUS EVERY 8 HOURS
Status: DISCONTINUED | OUTPATIENT
Start: 2018-10-25 | End: 2018-10-25

## 2018-10-25 RX ORDER — ASCORBIC ACID 500 MG
500 TABLET ORAL DAILY
Status: DISCONTINUED | OUTPATIENT
Start: 2018-10-26 | End: 2018-10-25

## 2018-10-25 RX ORDER — HYDROCODONE BITARTRATE AND ACETAMINOPHEN 10; 325 MG/1; MG/1
1 TABLET ORAL EVERY 4 HOURS PRN
Status: DISCONTINUED | OUTPATIENT
Start: 2018-10-25 | End: 2018-10-25

## 2018-10-25 RX ORDER — SWAB
1000 SWAB, NON-MEDICATED MISCELLANEOUS DAILY
Status: DISCONTINUED | OUTPATIENT
Start: 2018-10-25 | End: 2018-10-25 | Stop reason: RX

## 2018-10-25 RX ORDER — CYCLOBENZAPRINE HCL 5 MG
5 TABLET ORAL 3 TIMES DAILY PRN
Status: DISCONTINUED | OUTPATIENT
Start: 2018-10-25 | End: 2018-10-25

## 2018-10-25 RX ORDER — LIDOCAINE HYDROCHLORIDE 10 MG/ML
INJECTION, SOLUTION EPIDURAL; INFILTRATION; INTRACAUDAL; PERINEURAL AS NEEDED
Status: DISCONTINUED | OUTPATIENT
Start: 2018-10-25 | End: 2018-10-25 | Stop reason: SURG

## 2018-10-25 RX ORDER — DEXTROSE MONOHYDRATE 25 G/50ML
50 INJECTION, SOLUTION INTRAVENOUS
Status: DISCONTINUED | OUTPATIENT
Start: 2018-10-25 | End: 2018-10-25 | Stop reason: HOSPADM

## 2018-10-25 RX ORDER — IBUPROFEN 600 MG/1
600 TABLET ORAL EVERY 6 HOURS PRN
Qty: 30 TABLET | Refills: 0 | Status: SHIPPED | OUTPATIENT
Start: 2018-10-25 | End: 2020-11-26

## 2018-10-25 RX ORDER — BISACODYL 10 MG
10 SUPPOSITORY, RECTAL RECTAL
Status: DISCONTINUED | OUTPATIENT
Start: 2018-10-25 | End: 2018-10-25

## 2018-10-25 RX ORDER — METOCLOPRAMIDE 10 MG/1
10 TABLET ORAL ONCE
Status: DISCONTINUED | OUTPATIENT
Start: 2018-10-25 | End: 2018-10-25 | Stop reason: HOSPADM

## 2018-10-25 RX ORDER — DIPHENHYDRAMINE HYDROCHLORIDE 50 MG/ML
25 INJECTION INTRAMUSCULAR; INTRAVENOUS EVERY 4 HOURS PRN
Status: DISCONTINUED | OUTPATIENT
Start: 2018-10-25 | End: 2018-10-25

## 2018-10-25 RX ADMIN — EPHEDRINE SULFATE 10 MG: 50 INJECTION, SOLUTION INTRAVENOUS at 07:49:00

## 2018-10-25 RX ADMIN — CEFAZOLIN SODIUM/WATER 2 G: 2 G/20 ML SYRINGE (ML) INTRAVENOUS at 07:41:00

## 2018-10-25 RX ADMIN — PHENYLEPHRINE HCL 100 MCG: 10 MG/ML VIAL (ML) INJECTION at 08:52:00

## 2018-10-25 RX ADMIN — PHENYLEPHRINE HCL 100 MCG: 10 MG/ML VIAL (ML) INJECTION at 09:00:00

## 2018-10-25 RX ADMIN — DEXAMETHASONE SODIUM PHOSPHATE 4 MG: 4 MG/ML VIAL (ML) INJECTION at 07:41:00

## 2018-10-25 RX ADMIN — SODIUM CHLORIDE, SODIUM LACTATE, POTASSIUM CHLORIDE, CALCIUM CHLORIDE: 600; 310; 30; 20 INJECTION, SOLUTION INTRAVENOUS at 09:45:00

## 2018-10-25 RX ADMIN — PHENYLEPHRINE HCL 100 MCG: 10 MG/ML VIAL (ML) INJECTION at 08:35:00

## 2018-10-25 RX ADMIN — ONDANSETRON 4 MG: 2 INJECTION INTRAMUSCULAR; INTRAVENOUS at 09:35:00

## 2018-10-25 RX ADMIN — PHENYLEPHRINE HCL 100 MCG: 10 MG/ML VIAL (ML) INJECTION at 08:30:00

## 2018-10-25 RX ADMIN — PHENYLEPHRINE HCL 100 MCG: 10 MG/ML VIAL (ML) INJECTION at 09:05:00

## 2018-10-25 RX ADMIN — EPHEDRINE SULFATE 10 MG: 50 INJECTION, SOLUTION INTRAVENOUS at 07:55:00

## 2018-10-25 RX ADMIN — ROCURONIUM BROMIDE 10 MG: 10 INJECTION, SOLUTION INTRAVENOUS at 07:41:00

## 2018-10-25 RX ADMIN — PHENYLEPHRINE HCL 100 MCG: 10 MG/ML VIAL (ML) INJECTION at 08:05:00

## 2018-10-25 RX ADMIN — SODIUM CHLORIDE, SODIUM LACTATE, POTASSIUM CHLORIDE, CALCIUM CHLORIDE: 600; 310; 30; 20 INJECTION, SOLUTION INTRAVENOUS at 07:41:00

## 2018-10-25 RX ADMIN — PHENYLEPHRINE HCL 100 MCG: 10 MG/ML VIAL (ML) INJECTION at 08:48:00

## 2018-10-25 RX ADMIN — LIDOCAINE HYDROCHLORIDE 50 MG: 10 INJECTION, SOLUTION EPIDURAL; INFILTRATION; INTRACAUDAL; PERINEURAL at 07:41:00

## 2018-10-25 RX ADMIN — MIDAZOLAM HYDROCHLORIDE 2 MG: 1 INJECTION INTRAMUSCULAR; INTRAVENOUS at 07:35:00

## 2018-10-25 NOTE — INTERVAL H&P NOTE
Pre-op Diagnosis: L4-5 lumbar stenosis, lumbar radiculopathy, right foot drop    The above referenced H&P was reviewed by Jose Carlos Cisneros MD on 10/25/2018, the patient was examined and no significant changes have occurred in the patient's condition since

## 2018-10-25 NOTE — BRIEF OP NOTE
Pre-Operative Diagnosis: L4-5 lumbar stenosis, lumbar radiculopathy, right foot drop     Post-Operative Diagnosis: L4-5 lumbar stenosis, lumbar radiculopathy, right foot drop      Procedure Performed: L4-5 laminectomy, foraminotomies, fluoroscopy, microsco

## 2018-10-25 NOTE — OPERATIVE REPORT
[facility]    PATIENT'S NAME: Sharlene Edouard   ATTENDING PHYSICIAN: Nathan Christensen MD   OPERATING PHYSICIAN: Fanny Christensen MD   PATIENT ACCOUNT#:   [id number]    LOCATION:  [facility] [address] PACU 1 [place] [address]  MEDICAL RECORD #:   [id number] Gibson table. With the patient in position, his arms and legs and genitalia and eyeballs were in proper position and protected. His back with provisionally prepped with Betadine followed by a formal prep and drape with ChloraPrep.   Presumed incision w condition. He was not awake enough at the time of this dictation for a neurologic dictation. SSEP monitoring and EMG were stable throughout the duration of the case. The [place] was then removed at the end of the case.   There was no specimen sent, and the

## 2018-10-25 NOTE — ANESTHESIA PREPROCEDURE EVALUATION
Anesthesia PreOp Note    HPI:     Kasey Lester is a 69year old male who presents for preoperative consultation requested by: Flavia Newell MD    Date of Surgery: 10/25/2018    Procedure(s):  LUMBAR LAMINECTOMY 1 LEVEL  Indication: L4-5 lumbar st Date Noted: 05/18/2010      Sciatica         Date Noted: 06/12/2009        Past Medical History:   Diagnosis Date   • Back problem    • Calculus of kidney    • Cataract    • Cataracts, bilateral 12-7-15   • Footdrop     right   • Glaucoma    • High blood p 10/14/2018   Blood Glucose Monitoring Suppl ([address]) w/Device Does not apply Kit Testing twice a day    Diagnosis E11.65 Disp: 1 kit Rfl: 0 Unknown at Unknown time   ONETOUCH LANCETS Does not apply Misc Testing twice a day   Diagnosis E11.65 Rocuronium Bromide (ZEMURON) injection  Intravenous PRN Kumar Best, CRNA 10 mg at 10/25/18 3250   succinylcholine chloride (ANECTINE) injection  Intravenous PRN Luis Mahmood, CRNA 120 mg at 10/25/18 0741   dexamethasone Sodium P Mily Polanco MD    ondansetron HCl [facility]) injection 4 mg 4 mg Intravenous Once PRN Raimundo Brown MD    Prochlorperazine Edisylate (COMPAZINE) injection 5 mg 5 mg Intravenous Once PRN Raimundo Brown MD    haloperidol lactate (HALDOL) 5 MG/ML injection 0.25 m Asked        Seat Belt: Not Asked        Self-Exams: Not Asked    Social History Narrative      Not on file      Available pre-op labs reviewed.   Lab Results   Component Value Date    WBC 6.3 09/22/2018    RBC 3.68 (L) 09/22/2018    HGB 11.7 (L) 09/22/2018 major complications, and any alternative forms of anesthetic management. All of the patient's questions were answered to the best of my ability. The patient desires the anesthetic management as planned.   Rosa Wynn  10/25/2018 8:16 AM

## 2018-10-25 NOTE — ANESTHESIA PROCEDURE NOTES
ANESTHESIA INTUBATION  Urgency: elective    Airway not difficult    General Information and Staff    Patient location during procedure: OR  Anesthesiologist: Cynthia Rivera MD  Resident/CRNA: Trung Tarango CRNA  Performed: CRNA     Indications

## 2018-10-25 NOTE — ANESTHESIA POSTPROCEDURE EVALUATION
Patient: Callie Killian    Procedure Summary     Date:  10/25/18 Room / Location:  [facility] OR  / [facility] OR    Anesthesia Start:  0759 Anesthesia Stop:  3368    Procedure:  LUMBAR LAMINECTOMY 1 LEVEL (N/A ) Diagnosis:  (L4-5 lumbar stenosis, lumbar

## 2018-10-26 ENCOUNTER — TELEPHONE (OUTPATIENT)
Dept: INTERNAL MEDICINE CLINIC | Facility: CLINIC | Age: 78
End: 2018-10-26

## 2018-10-26 ENCOUNTER — PATIENT OUTREACH (OUTPATIENT)
Dept: CASE MANAGEMENT | Age: 78
End: 2018-10-26

## 2018-10-26 DIAGNOSIS — Z02.9 ENCOUNTERS FOR ADMINISTRATIVE PURPOSES: ICD-10-CM

## 2018-10-26 PROCEDURE — 1111F DSCHRG MED/CURRENT MED MERGE: CPT

## 2018-10-26 RX ORDER — ATORVASTATIN CALCIUM 40 MG/1
TABLET, FILM COATED ORAL
Qty: 90 TABLET | Refills: 1 | Status: SHIPPED | OUTPATIENT
Start: 2018-10-26 | End: 2018-10-26

## 2018-10-26 RX ORDER — ATORVASTATIN CALCIUM 40 MG/1
40 TABLET, FILM COATED ORAL NIGHTLY
Qty: 90 TABLET | Refills: 0 | Status: SHIPPED | OUTPATIENT
Start: 2018-10-26 | End: 2019-04-22

## 2018-10-26 NOTE — TELEPHONE ENCOUNTER
Current Outpatient Medications:     •  MetFORMIN HCl 1000 MG Oral Tab, Take 1 tablet (1,000 mg total) by mouth 2 (two) times daily.  Resume on 10/26/18 for evening dose, Disp: 180 tablet, Rfl: 0 REFILL       •  ATORVASTATIN 40 MG Oral Tab, TAKE ONE TABLET

## 2018-10-26 NOTE — PROGRESS NOTES
Initial Post Discharge Follow Up   Discharge Date: 10/25/18  Contact Date: 10/26/2018    Consent Verification:  Assessment Completed With: Patient  HIPAA Verified?   Yes     Discharge Dx:   Lumbar stenosis with neurogenic claudication     General:   • How by mouth 2 (two) times daily with meals. Disp: 60 tablet Rfl: 0   MetFORMIN HCl 1000 MG Oral Tab Take 1 tablet (1,000 mg total) by mouth 2 (two) times daily.  Resume on 10/26/18 for evening dose Disp: 180 tablet Rfl: 0   LISINOPRIL 5 MG Oral Tab TAKE 1 TABL exercise program (exercises for total joints and walking only for spine surgery)  no  Were you able to participate in any of the following educational opportunities:   Pre op class in person no   Pre op class online no   Discharge class in person no   Disc outreach. Pt states he is sore but manageable w rest and PRN medication. Reminded to keep bandage on until 10/28, reviewed dressing change instructions  As on AVS , endorsed to contact surgeon if any swelling, redness, dainage or fevers are noted.  Pt state

## 2018-10-26 NOTE — TELEPHONE ENCOUNTER
Pt discharged from [facility] on 10/25/18 . No TCM slots are available w in TCM window ( By 11/8/18). Please call to assist with  Scheduling  TCM follow up with Primary Care Physician.    Thanks

## 2018-11-16 ENCOUNTER — OFFICE VISIT (OUTPATIENT)
Dept: SURGERY | Facility: CLINIC | Age: 78
End: 2018-11-16
Payer: MEDICARE

## 2018-11-16 VITALS
WEIGHT: 181 LBS | DIASTOLIC BLOOD PRESSURE: 86 MMHG | SYSTOLIC BLOOD PRESSURE: 130 MMHG | HEART RATE: 92 BPM | BODY MASS INDEX: 28 KG/M2

## 2018-11-16 DIAGNOSIS — N13.4 HYDROURETER ON LEFT: ICD-10-CM

## 2018-11-16 DIAGNOSIS — N20.0 KIDNEY STONES: Primary | ICD-10-CM

## 2018-11-16 PROCEDURE — 99213 OFFICE O/P EST LOW 20 MIN: CPT | Performed by: UROLOGY

## 2018-11-16 PROCEDURE — G0463 HOSPITAL OUTPT CLINIC VISIT: HCPCS | Performed by: UROLOGY

## 2018-11-28 ENCOUNTER — HOSPITAL ENCOUNTER (OUTPATIENT)
Dept: ULTRASOUND IMAGING | Facility: HOSPITAL | Age: 78
Discharge: HOME | End: 2018-11-28
Attending: UROLOGY
Payer: MEDICARE

## 2018-11-28 DIAGNOSIS — N20.0 KIDNEY STONES: ICD-10-CM

## 2018-11-28 PROCEDURE — 76775 US EXAM ABDO BACK WALL LIM: CPT | Performed by: UROLOGY

## 2018-11-30 NOTE — PROGRESS NOTES
Stephany Patiño is a 69year old male. HPI:   Patient presents with:  Kidney Problem: patient presents for f/u on cysto stent removal       71-year-old male presents in follow-up to a previous visit September 28, 2018.   He status post cystoscopy, lef • INGUINAL HERNIA REPAIR Right 2005   • LUMBAR LAMINECTOMY 1 LEVEL N/A 10/25/2018    Performed by Klaudia Canseco MD at [address] [place] [address] MAIN OR   • OTHER SURGICAL HISTORY  2001    radical prostatectomy      Family History   Problem Relation Age of Onset   • Heart Glucose Blood (ONETOUCH ULTRA BLUE) In Vitro Strip Testing twice a day  Diagnosis E11.65 Disp: 100 strip Rfl: 3   Blood Glucose Monitoring Suppl Does not apply Kit Please dispense unit covered by insurance, glucometer, lancets and test strips.   Use to te

## 2019-01-07 RX ORDER — LISINOPRIL 5 MG/1
TABLET ORAL
Qty: 90 TABLET | Refills: 0 | Status: SHIPPED | OUTPATIENT
Start: 2019-01-07 | End: 2019-04-07

## 2019-01-09 ENCOUNTER — OFFICE VISIT (OUTPATIENT)
Dept: INTERNAL MEDICINE CLINIC | Facility: CLINIC | Age: 79
End: 2019-01-09
Payer: MEDICARE

## 2019-01-09 VITALS
HEIGHT: 67 IN | OXYGEN SATURATION: 98 % | SYSTOLIC BLOOD PRESSURE: 112 MMHG | DIASTOLIC BLOOD PRESSURE: 71 MMHG | HEART RATE: 94 BPM | WEIGHT: 180 LBS | TEMPERATURE: 98 F | BODY MASS INDEX: 28.25 KG/M2

## 2019-01-09 DIAGNOSIS — E11.65 UNCONTROLLED TYPE 2 DIABETES MELLITUS WITH HYPERGLYCEMIA (HCC): Primary | Chronic | ICD-10-CM

## 2019-01-09 DIAGNOSIS — I10 ESSENTIAL HYPERTENSION: ICD-10-CM

## 2019-01-09 PROBLEM — N17.9 AKI (ACUTE KIDNEY INJURY): Status: RESOLVED | Noted: 2018-09-20 | Resolved: 2019-01-09

## 2019-01-09 PROBLEM — N17.9 AKI (ACUTE KIDNEY INJURY) (HCC): Status: RESOLVED | Noted: 2018-09-20 | Resolved: 2019-01-09

## 2019-01-09 PROCEDURE — 99214 OFFICE O/P EST MOD 30 MIN: CPT | Performed by: INTERNAL MEDICINE

## 2019-01-09 RX ORDER — GLIMEPIRIDE 1 MG/1
1 TABLET ORAL
COMMUNITY
End: 2019-01-09

## 2019-01-09 NOTE — PATIENT INSTRUCTIONS
ASSESSMENT/PLAN:   Uncontrolled type 2 diabetes mellitus with hyperglycemia (hcc)  (primary encounter diagnosis) Doing good. Check blood and urine. Careful with diet and excercise at least 30 minutes 3-4 times a week.  Check blood pressures at differen

## 2019-01-09 NOTE — PROGRESS NOTES
HPI:    Patient ID: Bin Fitch is a 69year old male. HPI   Sees urologist Dr. Niurka Mcdaniel 1 month. Not know type of stone. Less iced stone. Hypertension  Patient is here for follow up of hypertension. BP at home:  Not check.   Has been compliant 09/22/2018 06:09 AM    AST 28 09/04/2018 03:01 PM    ALT 34 09/04/2018 03:01 PM    TSH 4.89 03/08/2017 01:44 PM        Lab Results   Component Value Date/Time    CHOLEST 138 10/04/2017 10:47 AM    HDL 41 10/04/2017 10:47 AM    TRIG 172 (H) 10/04/2017 10:47 weakness, light-headedness, numbness and headaches. Hematological: Negative for adenopathy. Psychiatric/Behavioral: Negative.   Negative for agitation, behavioral problems, confusion, decreased concentration, dysphoric mood, hallucinations, self-injury, Calculus of kidney    • Cataract    • Cataracts, bilateral 12-7-15   • Footdrop     right   • Glaucoma    • High blood pressure    • High cholesterol    • History of blood transfusion     5 years ago   • Hypogammaglobulinemia (HCC)     mild, IgG1 and IgM; swelling or tenderness. No foreign bodies. No mastoid tenderness. Tympanic membrane is not injected, not scarred, not perforated, not erythematous, not retracted and not bulging. Tympanic membrane mobility is normal. No middle ear effusion.  No hemotympanum present. No tracheal deviation present. No thyroid mass and no thyromegaly present. Cardiovascular: Normal rate, regular rhythm, S1 normal, S2 normal, normal heart sounds, intact distal pulses and normal pulses.    Pulses:       Carotid pulses are 2+ on t no clubbing. Psychiatric: He has a normal mood and affect. His speech is normal and behavior is normal. Cognition and memory are normal.   Nursing note and vitals reviewed.   Bilateral barefoot skin diabetic exam is normal, visualized feet and the appeara

## 2019-01-11 ENCOUNTER — APPOINTMENT (OUTPATIENT)
Dept: LAB | Age: 79
End: 2019-01-11
Attending: INTERNAL MEDICINE
Payer: MEDICARE

## 2019-01-11 DIAGNOSIS — IMO0001 UNCONTROLLED TYPE 2 DIABETES MELLITUS WITHOUT COMPLICATION, WITHOUT LONG-TERM CURRENT USE OF INSULIN: Chronic | ICD-10-CM

## 2019-01-11 DIAGNOSIS — E11.65 UNCONTROLLED TYPE 2 DIABETES MELLITUS WITH HYPERGLYCEMIA (HCC): Chronic | ICD-10-CM

## 2019-01-11 DIAGNOSIS — R10.32 ABDOMINAL PAIN, LEFT LOWER QUADRANT: ICD-10-CM

## 2019-01-11 LAB
ALBUMIN SERPL BCP-MCNC: 4 G/DL (ref 3.5–4.8)
ALBUMIN/GLOB SERPL: 1.6 {RATIO} (ref 1–2)
ALP SERPL-CCNC: 96 U/L (ref 32–100)
ALT SERPL-CCNC: 24 U/L (ref 17–63)
ANION GAP SERPL CALC-SCNC: 13 MMOL/L (ref 0–18)
AST SERPL-CCNC: 23 U/L (ref 15–41)
BILIRUB SERPL-MCNC: 0.5 MG/DL (ref 0.3–1.2)
BUN SERPL-MCNC: 20 MG/DL (ref 8–20)
BUN/CREAT SERPL: 15.2 (ref 10–20)
CALCIUM SERPL-MCNC: 9.5 MG/DL (ref 8.5–10.5)
CHLORIDE SERPL-SCNC: 104 MMOL/L (ref 95–110)
CHOLEST SERPL-MCNC: 133 MG/DL (ref 110–200)
CO2 SERPL-SCNC: 23 MMOL/L (ref 22–32)
CREAT SERPL-MCNC: 1.32 MG/DL (ref 0.5–1.5)
CREAT UR-MCNC: 168 MG/DL
ERYTHROCYTE [DISTWIDTH] IN BLOOD BY AUTOMATED COUNT: 13.5 % (ref 11–15)
GLOBULIN PLAS-MCNC: 2.5 G/DL (ref 2.5–3.7)
GLUCOSE SERPL-MCNC: 135 MG/DL (ref 70–99)
HCT VFR BLD AUTO: 40.6 % (ref 41–52)
HDLC SERPL-MCNC: 38 MG/DL
HGB BLD-MCNC: 13.4 G/DL (ref 13.5–17.5)
LDLC SERPL CALC-MCNC: 61 MG/DL (ref 0–99)
MCH RBC QN AUTO: 30.7 PG (ref 27–32)
MCHC RBC AUTO-ENTMCNC: 33.1 G/DL (ref 32–37)
MCV RBC AUTO: 92.7 FL (ref 80–100)
MICROALBUMIN UR-MCNC: 0.3 MG/DL (ref 0–1.8)
MICROALBUMIN/CREAT UR: 1.8 MG/G{CREAT} (ref 0–20)
NONHDLC SERPL-MCNC: 95 MG/DL
OSMOLALITY UR CALC.SUM OF ELEC: 295 MOSM/KG (ref 275–295)
PLATELET # BLD AUTO: 335 K/UL (ref 140–400)
PMV BLD AUTO: 7.1 FL (ref 7.4–10.3)
POTASSIUM SERPL-SCNC: 4.8 MMOL/L (ref 3.3–5.1)
PROT SERPL-MCNC: 6.5 G/DL (ref 5.9–8.4)
RBC # BLD AUTO: 4.38 M/UL (ref 4.5–5.9)
SODIUM SERPL-SCNC: 140 MMOL/L (ref 136–144)
TRIGL SERPL-MCNC: 169 MG/DL (ref 1–149)
WBC # BLD AUTO: 6.7 K/UL (ref 4–11)

## 2019-01-11 PROCEDURE — 82570 ASSAY OF URINE CREATININE: CPT

## 2019-01-11 PROCEDURE — 36415 COLL VENOUS BLD VENIPUNCTURE: CPT

## 2019-01-11 PROCEDURE — 85027 COMPLETE CBC AUTOMATED: CPT

## 2019-01-11 PROCEDURE — 80061 LIPID PANEL: CPT

## 2019-01-11 PROCEDURE — 80053 COMPREHEN METABOLIC PANEL: CPT

## 2019-01-11 PROCEDURE — 82043 UR ALBUMIN QUANTITATIVE: CPT

## 2019-01-14 RX ORDER — LISINOPRIL 5 MG/1
TABLET ORAL
Qty: 90 TABLET | Refills: 0 | OUTPATIENT
Start: 2019-01-14

## 2019-01-14 NOTE — TELEPHONE ENCOUNTER
Pt calling on this refill request . Pt is complete out would like to know if we can send refill today

## 2019-02-26 ENCOUNTER — OFFICE VISIT (OUTPATIENT)
Dept: SURGERY | Facility: CLINIC | Age: 79
End: 2019-02-26
Payer: MEDICARE

## 2019-02-26 VITALS
HEIGHT: 68 IN | HEART RATE: 80 BPM | WEIGHT: 172 LBS | DIASTOLIC BLOOD PRESSURE: 71 MMHG | SYSTOLIC BLOOD PRESSURE: 106 MMHG | RESPIRATION RATE: 16 BRPM | BODY MASS INDEX: 26.07 KG/M2 | TEMPERATURE: 98 F

## 2019-02-26 DIAGNOSIS — N13.4 HYDROURETER ON LEFT: ICD-10-CM

## 2019-02-26 DIAGNOSIS — N20.0 KIDNEY STONES: Primary | ICD-10-CM

## 2019-02-26 PROCEDURE — 99213 OFFICE O/P EST LOW 20 MIN: CPT | Performed by: UROLOGY

## 2019-02-26 PROCEDURE — G0463 HOSPITAL OUTPT CLINIC VISIT: HCPCS | Performed by: UROLOGY

## 2019-02-26 NOTE — PROGRESS NOTES
Josiah Lewis is a 69year old male. HPI:   Patient presents with:  Kidney Problem: pt is here follow up from 11/2018 has no complaints ultrasound was completed 11/2019      69year-old male in follow-up to visit November 16, 2018.   He status post CYSTOSCOPY URETEROSCOPY Left 9/13/2018    Performed by Tim Coronado MD at [facility] MAIN OR   • INGUINAL HERNIA REPAIR Right 2005   • LUMBAR LAMINECTOMY 1 LEVEL N/A 10/25/2018    Performed by Matthew Hood MD at [address]   • OTHER SURGICAL HISTORY  200 day  Diagnosis E11.65 Disp: 100 strip Rfl: 3   Blood Glucose Monitoring Suppl Does not apply Kit Please dispense unit covered by insurance, glucometer, lancets and test strips.   Use to test blood glucose twice daily Disp: 1 kit Rfl: 0   Glucose Blood (ONET

## 2019-04-08 RX ORDER — LISINOPRIL 5 MG/1
TABLET ORAL
Qty: 90 TABLET | Refills: 0 | Status: SHIPPED | OUTPATIENT
Start: 2019-04-08 | End: 2019-07-07

## 2019-04-08 NOTE — TELEPHONE ENCOUNTER
Passed protocol  Hypertensive Medications  Protocol Criteria:  · Appointment scheduled in the past 6 months or in the next 3 months  · BMP or CMP in the past 12 months  · Creatinine result < 2  Recent Outpatient Visits            1 month ago Kidney stones

## 2019-04-22 RX ORDER — ATORVASTATIN CALCIUM 40 MG/1
TABLET, FILM COATED ORAL
Qty: 90 TABLET | Refills: 1 | Status: SHIPPED | OUTPATIENT
Start: 2019-04-22 | End: 2019-10-17

## 2019-04-22 NOTE — TELEPHONE ENCOUNTER
Refill passed per [facility] protocol.   Cholesterol Medications  Protocol Criteria:  · Appointment scheduled in the past 12 months or in the next 3 months  · ALT & LDL on file in the past 12 months  · ALT result < 80  · LDL result <130   Recent Outpat

## 2019-07-08 ENCOUNTER — OFFICE VISIT (OUTPATIENT)
Dept: INTERNAL MEDICINE CLINIC | Facility: CLINIC | Age: 79
End: 2019-07-08
Payer: MEDICARE

## 2019-07-08 VITALS
BODY MASS INDEX: 28 KG/M2 | SYSTOLIC BLOOD PRESSURE: 132 MMHG | WEIGHT: 186 LBS | DIASTOLIC BLOOD PRESSURE: 82 MMHG | HEART RATE: 69 BPM | OXYGEN SATURATION: 98 % | TEMPERATURE: 98 F

## 2019-07-08 DIAGNOSIS — E78.2 MIXED HYPERLIPIDEMIA: ICD-10-CM

## 2019-07-08 DIAGNOSIS — D50.8 OTHER IRON DEFICIENCY ANEMIA: ICD-10-CM

## 2019-07-08 DIAGNOSIS — E11.65 UNCONTROLLED TYPE 2 DIABETES MELLITUS WITH HYPERGLYCEMIA (HCC): Primary | Chronic | ICD-10-CM

## 2019-07-08 LAB
ALBUMIN SERPL-MCNC: 4.1 G/DL (ref 3.4–5)
ALBUMIN/GLOB SERPL: 1.4 {RATIO} (ref 1–2)
ALP LIVER SERPL-CCNC: 86 U/L (ref 45–117)
ALT SERPL-CCNC: 30 U/L (ref 16–61)
ANION GAP SERPL CALC-SCNC: 7 MMOL/L (ref 0–18)
AST SERPL-CCNC: 19 U/L (ref 15–37)
BASOPHILS # BLD AUTO: 0.05 X10(3) UL (ref 0–0.2)
BASOPHILS NFR BLD AUTO: 0.8 %
BILIRUB SERPL-MCNC: 0.3 MG/DL (ref 0.1–2)
BUN BLD-MCNC: 22 MG/DL (ref 7–18)
BUN/CREAT SERPL: 14.4 (ref 10–20)
CALCIUM BLD-MCNC: 10.1 MG/DL (ref 8.5–10.1)
CHLORIDE SERPL-SCNC: 108 MMOL/L (ref 98–112)
CO2 SERPL-SCNC: 26 MMOL/L (ref 21–32)
CREAT BLD-MCNC: 1.53 MG/DL (ref 0.7–1.3)
DEPRECATED RDW RBC AUTO: 47.2 FL (ref 35.1–46.3)
EOSINOPHIL # BLD AUTO: 0.85 X10(3) UL (ref 0–0.7)
EOSINOPHIL NFR BLD AUTO: 13.1 %
ERYTHROCYTE [DISTWIDTH] IN BLOOD BY AUTOMATED COUNT: 13.2 % (ref 11–15)
GLOBULIN PLAS-MCNC: 2.9 G/DL (ref 2.8–4.4)
GLUCOSE BLD-MCNC: 144 MG/DL (ref 70–99)
HCT VFR BLD AUTO: 41.5 % (ref 39–53)
HGB BLD-MCNC: 13.2 G/DL (ref 13–17.5)
IMM GRANULOCYTES # BLD AUTO: 0.01 X10(3) UL (ref 0–1)
IMM GRANULOCYTES NFR BLD: 0.2 %
LYMPHOCYTES # BLD AUTO: 1.39 X10(3) UL (ref 1–4)
LYMPHOCYTES NFR BLD AUTO: 21.4 %
M PROTEIN MFR SERPL ELPH: 7 G/DL (ref 6.4–8.2)
MCH RBC QN AUTO: 31.3 PG (ref 26–34)
MCHC RBC AUTO-ENTMCNC: 31.8 G/DL (ref 31–37)
MCV RBC AUTO: 98.3 FL (ref 80–100)
MONOCYTES # BLD AUTO: 0.53 X10(3) UL (ref 0.1–1)
MONOCYTES NFR BLD AUTO: 8.2 %
NEUTROPHILS # BLD AUTO: 3.66 X10 (3) UL (ref 1.5–7.7)
NEUTROPHILS # BLD AUTO: 3.66 X10(3) UL (ref 1.5–7.7)
NEUTROPHILS NFR BLD AUTO: 56.3 %
OSMOLALITY SERPL CALC.SUM OF ELEC: 298 MOSM/KG (ref 275–295)
PATIENT FASTING: YES
PLATELET # BLD AUTO: 314 10(3)UL (ref 150–450)
POTASSIUM SERPL-SCNC: 5.2 MMOL/L (ref 3.5–5.1)
RBC # BLD AUTO: 4.22 X10(6)UL (ref 3.8–5.8)
SODIUM SERPL-SCNC: 141 MMOL/L (ref 136–145)
WBC # BLD AUTO: 6.5 X10(3) UL (ref 4–11)

## 2019-07-08 PROCEDURE — 36415 COLL VENOUS BLD VENIPUNCTURE: CPT | Performed by: INTERNAL MEDICINE

## 2019-07-08 PROCEDURE — 99214 OFFICE O/P EST MOD 30 MIN: CPT | Performed by: INTERNAL MEDICINE

## 2019-07-08 RX ORDER — LISINOPRIL 5 MG/1
TABLET ORAL
Qty: 90 TABLET | Refills: 1 | Status: SHIPPED | OUTPATIENT
Start: 2019-07-08 | End: 2019-07-10

## 2019-07-08 NOTE — TELEPHONE ENCOUNTER
Refill passed per [facility] protocol.   Hypertensive Medications  Protocol Criteria:  · Appointment scheduled in the past 6 months or in the next 3 months  · BMP or CMP in the past 12 months  · Creatinine result < 2  Recent Outpatient Visits

## 2019-07-08 NOTE — PROGRESS NOTES
HPI:    Patient ID: Kendell Pinzon is a 69year old male. HPI   Diabetes  Patient here for follow up of Diabetes. Has been taking medications regularly. Checks sugars 1 times daily. Fasting sugars average higher in last few weeks.  This AM was 13  01/11/2019 08:57 AM    CO2 23 01/11/2019 08:57 AM    CREATSERUM 1.32 01/11/2019 08:57 AM    CA 9.5 01/11/2019 08:57 AM    AST 23 01/11/2019 08:57 AM    ALT 24 01/11/2019 08:57 AM    TSH 4.89 03/08/2017 01:44 PM        Lab Results   Component Value D Rfl: 0   Blood Glucose Monitoring Suppl ([address]   [address]) w/Device Does not apply Kit Testing twice a day    Diagnosis E11.65 Disp: 1 kit Rfl: 0   ONETOUCH LANCETS Does not apply Misc Testing twice a day   Diagnosis E11.65 Disp: 100 each Rfl: 3   Glu Performed by Nik Ledezma MD at [facility] MAIN OR   • OTHER SURGICAL HISTORY  2001    radical prostatectomy      Family History   Problem Relation Age of Onset   • Heart Attack Father         CAD   • Cancer Father         lung   • Cancer Sister         c and affect. Nursing note and vitals reviewed.   Bilateral barefoot skin diabetic exam is normal, visualized feet and the appearance is normal.  Bilateral sensation of both feet is normal.  Pulsation pedal pulse exam of both lower legs/feet is normal as we

## 2019-07-08 NOTE — PATIENT INSTRUCTIONS
ASSESSMENT/PLAN:   Uncontrolled type 2 diabetes mellitus with hyperglycemia (hcc)  (primary encounter diagnosis) Better. Check blood. Awaiting eye exam. Careful with diet and excercise at least 30 minutes 3-4 times a week.  Check sugars at different times o

## 2019-07-09 LAB
EST. AVERAGE GLUCOSE BLD GHB EST-MCNC: 174 MG/DL (ref 68–126)
HBA1C MFR BLD HPLC: 7.7 % (ref ?–5.7)

## 2019-07-10 NOTE — PROGRESS NOTES
CBC Normal (white blood cells and red blood cells and platelets),   CMP Normal (electrolytes, and liver functions), significant worsening of kidney functions and potassium is slightly elevated. No motrin, ibuprofen, advil, alleve, naprosyn  with these medic

## 2019-07-22 NOTE — TELEPHONE ENCOUNTER
Please review; protocol failed.     Requested Prescriptions     Pending Prescriptions Disp Refills   • METFORMIN HCL 1000 MG Oral Tab [Pharmacy Med Name: METFORMIN 1000MG TAB] 180 tablet 1     Sig: TAKE 1 TABLET BY MOUTH TWICE DAILY         Recent Visits  D

## 2019-07-29 ENCOUNTER — TELEPHONE (OUTPATIENT)
Dept: INTERNAL MEDICINE CLINIC | Facility: CLINIC | Age: 79
End: 2019-07-29

## 2019-07-29 NOTE — TELEPHONE ENCOUNTER
Pt is in the lab for labs, lipid panel ordered but patient ate waffles with ice cream and black coffee, can he still have lipid panel drawn? Please advise pt is at [facility].

## 2019-07-31 ENCOUNTER — LAB ENCOUNTER (OUTPATIENT)
Dept: LAB | Facility: HOSPITAL | Age: 79
End: 2019-07-31
Attending: INTERNAL MEDICINE
Payer: MEDICARE

## 2019-07-31 DIAGNOSIS — E11.65 UNCONTROLLED TYPE 2 DIABETES MELLITUS WITH HYPERGLYCEMIA (HCC): Chronic | ICD-10-CM

## 2019-07-31 DIAGNOSIS — E87.5 HYPERKALEMIA: ICD-10-CM

## 2019-07-31 LAB
ANION GAP SERPL CALC-SCNC: 6 MMOL/L (ref 0–18)
BUN BLD-MCNC: 20 MG/DL (ref 7–18)
BUN/CREAT SERPL: 13.3 (ref 10–20)
CALCIUM BLD-MCNC: 9.4 MG/DL (ref 8.5–10.1)
CHLORIDE SERPL-SCNC: 109 MMOL/L (ref 98–112)
CHOLEST SMN-MCNC: 129 MG/DL (ref ?–200)
CO2 SERPL-SCNC: 27 MMOL/L (ref 21–32)
CREAT BLD-MCNC: 1.5 MG/DL (ref 0.7–1.3)
GLUCOSE BLD-MCNC: 154 MG/DL (ref 70–99)
HDLC SERPL-MCNC: 42 MG/DL (ref 40–59)
LDLC SERPL CALC-MCNC: 48 MG/DL (ref ?–100)
NONHDLC SERPL-MCNC: 87 MG/DL (ref ?–130)
OSMOLALITY SERPL CALC.SUM OF ELEC: 300 MOSM/KG (ref 275–295)
PATIENT FASTING: YES
PATIENT FASTING: YES
POTASSIUM SERPL-SCNC: 4.8 MMOL/L (ref 3.5–5.1)
SODIUM SERPL-SCNC: 142 MMOL/L (ref 136–145)
TRIGL SERPL-MCNC: 195 MG/DL (ref 30–149)
VLDLC SERPL CALC-MCNC: 39 MG/DL (ref 0–30)

## 2019-07-31 PROCEDURE — 80061 LIPID PANEL: CPT

## 2019-07-31 PROCEDURE — 36415 COLL VENOUS BLD VENIPUNCTURE: CPT

## 2019-07-31 PROCEDURE — 80048 BASIC METABOLIC PNL TOTAL CA: CPT

## 2019-08-05 NOTE — PROGRESS NOTES
CMP Normal (electrolytes, sugar, and liver functions), decline in kidney function is stable from prior. But still declined. No motrin, ibuprofen, advil, alleve, naprosyn  with these medications.    Lipid (choilesterol) is good,

## 2019-09-10 ENCOUNTER — TELEPHONE (OUTPATIENT)
Dept: INTERNAL MEDICINE CLINIC | Facility: CLINIC | Age: 79
End: 2019-09-10

## 2019-09-23 ENCOUNTER — MED REC SCAN ONLY (OUTPATIENT)
Dept: INTERNAL MEDICINE CLINIC | Facility: CLINIC | Age: 79
End: 2019-09-23

## 2019-09-23 ENCOUNTER — NURSE ONLY (OUTPATIENT)
Dept: INTERNAL MEDICINE CLINIC | Facility: CLINIC | Age: 79
End: 2019-09-23
Payer: MEDICARE

## 2019-09-23 DIAGNOSIS — Z23 NEED FOR VACCINATION: ICD-10-CM

## 2019-09-23 PROCEDURE — 90662 IIV NO PRSV INCREASED AG IM: CPT | Performed by: INTERNAL MEDICINE

## 2019-09-23 PROCEDURE — G0008 ADMIN INFLUENZA VIRUS VAC: HCPCS | Performed by: INTERNAL MEDICINE

## 2019-10-17 RX ORDER — ATORVASTATIN CALCIUM 40 MG/1
TABLET, FILM COATED ORAL
Qty: 90 TABLET | Refills: 1 | Status: SHIPPED | OUTPATIENT
Start: 2019-10-17 | End: 2020-04-17

## 2019-10-18 NOTE — TELEPHONE ENCOUNTER
Refill passed per [facility] protocol.   Cholesterol Medications  Protocol Criteria:  · Appointment scheduled in the past 12 months or in the next 3 months  · ALT & LDL on file in the past 12 months  · ALT result < 80  · LDL result <130   Recent Outpat

## 2019-10-25 ENCOUNTER — APPOINTMENT (OUTPATIENT)
Dept: CT IMAGING | Facility: HOSPITAL | Age: 79
End: 2019-10-25
Attending: EMERGENCY MEDICINE
Payer: MEDICARE

## 2019-10-25 ENCOUNTER — APPOINTMENT (OUTPATIENT)
Dept: GENERAL RADIOLOGY | Facility: HOSPITAL | Age: 79
End: 2019-10-25
Attending: EMERGENCY MEDICINE
Payer: MEDICARE

## 2019-10-25 ENCOUNTER — HOSPITAL ENCOUNTER (EMERGENCY)
Facility: HOSPITAL | Age: 79
Discharge: HOME | End: 2019-10-25
Attending: EMERGENCY MEDICINE
Payer: MEDICARE

## 2019-10-25 VITALS
WEIGHT: 175 LBS | TEMPERATURE: 97 F | OXYGEN SATURATION: 97 % | RESPIRATION RATE: 18 BRPM | HEIGHT: 68 IN | BODY MASS INDEX: 26.52 KG/M2 | DIASTOLIC BLOOD PRESSURE: 79 MMHG | SYSTOLIC BLOOD PRESSURE: 149 MMHG | HEART RATE: 60 BPM

## 2019-10-25 DIAGNOSIS — R10.9 ACUTE RIGHT FLANK PAIN: Primary | ICD-10-CM

## 2019-10-25 PROCEDURE — 93010 ELECTROCARDIOGRAM REPORT: CPT | Performed by: EMERGENCY MEDICINE

## 2019-10-25 PROCEDURE — 80076 HEPATIC FUNCTION PANEL: CPT | Performed by: EMERGENCY MEDICINE

## 2019-10-25 PROCEDURE — 96361 HYDRATE IV INFUSION ADD-ON: CPT

## 2019-10-25 PROCEDURE — 83735 ASSAY OF MAGNESIUM: CPT | Performed by: EMERGENCY MEDICINE

## 2019-10-25 PROCEDURE — 85379 FIBRIN DEGRADATION QUANT: CPT | Performed by: EMERGENCY MEDICINE

## 2019-10-25 PROCEDURE — 96374 THER/PROPH/DIAG INJ IV PUSH: CPT

## 2019-10-25 PROCEDURE — 99285 EMERGENCY DEPT VISIT HI MDM: CPT

## 2019-10-25 PROCEDURE — 74176 CT ABD & PELVIS W/O CONTRAST: CPT | Performed by: EMERGENCY MEDICINE

## 2019-10-25 PROCEDURE — 83690 ASSAY OF LIPASE: CPT | Performed by: EMERGENCY MEDICINE

## 2019-10-25 PROCEDURE — 85025 COMPLETE CBC W/AUTO DIFF WBC: CPT | Performed by: EMERGENCY MEDICINE

## 2019-10-25 PROCEDURE — 81001 URINALYSIS AUTO W/SCOPE: CPT | Performed by: EMERGENCY MEDICINE

## 2019-10-25 PROCEDURE — 80048 BASIC METABOLIC PNL TOTAL CA: CPT | Performed by: EMERGENCY MEDICINE

## 2019-10-25 PROCEDURE — 93005 ELECTROCARDIOGRAM TRACING: CPT

## 2019-10-25 PROCEDURE — 71045 X-RAY EXAM CHEST 1 VIEW: CPT | Performed by: EMERGENCY MEDICINE

## 2019-10-25 PROCEDURE — 96375 TX/PRO/DX INJ NEW DRUG ADDON: CPT

## 2019-10-25 RX ORDER — PANTOPRAZOLE SODIUM 40 MG/1
40 TABLET, DELAYED RELEASE ORAL DAILY
Qty: 30 TABLET | Refills: 0 | Status: SHIPPED | OUTPATIENT
Start: 2019-10-25 | End: 2019-11-24

## 2019-10-25 RX ORDER — KETOROLAC TROMETHAMINE 15 MG/ML
15 INJECTION, SOLUTION INTRAMUSCULAR; INTRAVENOUS ONCE
Status: COMPLETED | OUTPATIENT
Start: 2019-10-25 | End: 2019-10-25

## 2019-10-25 RX ORDER — MORPHINE SULFATE 4 MG/ML
4 INJECTION, SOLUTION INTRAMUSCULAR; INTRAVENOUS ONCE
Status: COMPLETED | OUTPATIENT
Start: 2019-10-25 | End: 2019-10-25

## 2019-10-25 RX ORDER — DICYCLOMINE HCL 20 MG
20 TABLET ORAL 4 TIMES DAILY PRN
Qty: 30 TABLET | Refills: 0 | Status: SHIPPED | OUTPATIENT
Start: 2019-10-25 | End: 2019-11-24

## 2019-10-25 NOTE — ED PROVIDER NOTES
Patient Seen in: [facility] AND [facility] Emergency Department      History   Patient presents with:  Back Pain (musculoskeletal)    Stated Complaint: thinks he has a kidney stone, shooting pain in back, hx of kidneys stones in pa*    HPI    67-year-old male pr SURGICAL HISTORY      radical prostatectomy                    Social History    Tobacco Use      Smoking status: Former Smoker        Packs/day: 1.00        Types: Cigarettes        Quit date: 1998        Years since quittin.8      Smokeless breath sounds. Abdominal:      General: Abdomen is flat. Bowel sounds are normal. There is no distension. Palpations: Abdomen is soft. There is no mass. Tenderness: There is no tenderness.  There is no right CVA tenderness, left CVA tenderness o MDM    Patient's EKG without any acute ischemic changes, I do not suspect any cardiac etiology at this time. CBC BMP hepatic panel and urinalysis are all unremarkable. Lipase was elevated.   I spoke with Dr. Meryle Nailer with gastroenterology who patient's size. Iterative reconstruction technique for dose reduction was employed. Dose information was transmitted to the [facility]) Jovany Sanders [address] ([address]), which includes the Dose Index Registry.    FINDINGS: COM clips in the gallbladder fossa. PANCREAS: There is mild diffuse fatty replacement. Negative unenhanced appearance for fluid collection or ductal dilatation. SPLEEN: No enlargement. ADRENALS:   No defined mass or abnormal enlargement.   GI/MESENTERY:  A la hernia with near-complete intrathoracic herniation of the stomach. 6. Status post cholecystectomy without significant hepatobiliary dilatation. 7. Marked hepatic steatosis with geographic areas of fatty sparing.   8. Stable pleural-based nodule in the le treatment plan. All questions were addressed and answered.                       Disposition and Plan     Clinical Impression:  Acute right flank pain  (primary encounter diagnosis)    Disposition:  Discharge  10/25/2019 12:50 pm    Follow-up:  Lai Canseco

## 2019-10-25 NOTE — ED INITIAL ASSESSMENT (HPI)
Brennastefanie Bessy reports 3 episodes now of intermittent pain. Last week it was in his RLQ, then a few days later in his right shoulder blade area , and then today right flank area    Patient denies any other symptoms .  Hx of kidney stones and wants to [facility]

## 2019-10-30 ENCOUNTER — TELEPHONE (OUTPATIENT)
Dept: OTHER | Age: 79
End: 2019-10-30

## 2019-11-01 NOTE — TELEPHONE ENCOUNTER
Refill passed per [facility] protocol.     Diabetic Supplies  Protocol Criteria:  · Appointment scheduled in past 12 months or the next 3 months

## 2019-11-04 NOTE — TELEPHONE ENCOUNTER
Current Outpatient Medications:   •  Glucose Blood (ONETOUCH ULTRA BLUE) In Vitro Strip, USE ONE STRIP TO CHECK GLUCOSE TWICE DAILY, Disp: 200 strip, Rfl: 3

## 2019-11-05 NOTE — TELEPHONE ENCOUNTER
Current Outpatient Medications:     •  ONETOUCH LANCETS Does not apply Misc, Testing twice a day  Diagnosis E11.65, Disp: 100 each, Rfl: 3 REFILL

## 2019-11-06 NOTE — TELEPHONE ENCOUNTER
Diabetic supplies PASSED protocol. Rx filled per protocol.     Diabetic Supplies  Protocol Criteria:  · Appointment scheduled in past 12 months or the next 3 months

## 2019-11-07 ENCOUNTER — TELEPHONE (OUTPATIENT)
Dept: INTERNAL MEDICINE CLINIC | Facility: CLINIC | Age: 79
End: 2019-11-07

## 2019-11-07 DIAGNOSIS — E11.65 UNCONTROLLED TYPE 2 DIABETES MELLITUS WITH HYPERGLYCEMIA (HCC): Primary | Chronic | ICD-10-CM

## 2019-11-07 NOTE — TELEPHONE ENCOUNTER
Refill passed per [facility] protocol.   Refill Protocol Appointment Criteria  · Appointment scheduled in the past 12 months or in the next 3 months  Recent Outpatient Visits            1 month ago Need for vaccination    [facility], [address],

## 2019-11-15 ENCOUNTER — TELEPHONE (OUTPATIENT)
Dept: INTERNAL MEDICINE CLINIC | Facility: CLINIC | Age: 79
End: 2019-11-15

## 2019-11-15 NOTE — TELEPHONE ENCOUNTER
Patient asking to clarify his active medications--relayed to patient Atorvastatin 40 mg daily, Metformin 100 mg BID--no Glimepiride nor Lisinopril is active--patient verbalizes understanding and agreement.     Patient asking why his medication costs increas

## 2019-11-25 ENCOUNTER — TELEPHONE (OUTPATIENT)
Dept: INTERNAL MEDICINE CLINIC | Facility: CLINIC | Age: 79
End: 2019-11-25

## 2019-11-25 NOTE — TELEPHONE ENCOUNTER
Patient on the call, gave his name, birthdate. Needs to confirm about Dr Zhang Garcia participating in the [facility] program/plan, then listed specific areas, all related to billing. Gave Billing phone # to call for information.

## 2019-11-30 PROBLEM — E11.9 TYPE II OR UNSPECIFIED TYPE DIABETES MELLITUS WITHOUT MENTION OF COMPLICATION, NOT STATED AS UNCONTROLLED: Status: ACTIVE | Noted: 2019-11-30

## 2020-01-06 PROBLEM — Z86.010 HISTORY OF ADENOMATOUS POLYP OF COLON: Status: ACTIVE | Noted: 2020-01-06

## 2020-01-06 PROBLEM — Z86.0101 HISTORY OF ADENOMATOUS POLYP OF COLON: Status: ACTIVE | Noted: 2020-01-06

## 2020-02-19 ENCOUNTER — TELEPHONE (OUTPATIENT)
Dept: OTHER | Age: 80
End: 2020-02-19

## 2020-02-19 DIAGNOSIS — Z12.11 ENCOUNTER FOR SCREENING COLONOSCOPY: Primary | ICD-10-CM

## 2020-02-19 NOTE — TELEPHONE ENCOUNTER
[facility]  he should make a follow up appointment with DR Serena Harrell

## 2020-02-19 NOTE — TELEPHONE ENCOUNTER
Left detailed message informing patient of order placed and authorized and advising for him to schedule a follow up appointment with Dr Renzo Jacques.

## 2020-02-19 NOTE — TELEPHONE ENCOUNTER
Pt stated he has an appt 3/4/20 with GI  -Dr Roque Barnes , was advised by office need referral     Referral pending for approval   Please call Pt when approved

## 2020-02-19 NOTE — TELEPHONE ENCOUNTER
Dr. Salvador Mays, patient is requesting a referral for Gastro Dr. Katie Lunsford. Colonoscopy is scheduled for 03/4/2020.     Please reply to pool:  MANAGED CARE - MAIN

## 2020-04-17 RX ORDER — ATORVASTATIN CALCIUM 40 MG/1
TABLET, FILM COATED ORAL
Qty: 90 TABLET | Refills: 0 | Status: SHIPPED | OUTPATIENT
Start: 2020-04-17 | End: 2020-07-18

## 2020-04-19 ENCOUNTER — TELEPHONE (OUTPATIENT)
Dept: INTERNAL MEDICINE CLINIC | Facility: CLINIC | Age: 80
End: 2020-04-19

## 2020-04-19 DIAGNOSIS — E11.65 UNCONTROLLED TYPE 2 DIABETES MELLITUS WITH HYPERGLYCEMIA (HCC): Primary | ICD-10-CM

## 2020-04-19 NOTE — TELEPHONE ENCOUNTER
1.  Due for eye exam for the sugars. Not due until July 17.    2. Blood work regarding sugars. Orders written. Fasting.

## 2020-04-23 ENCOUNTER — LAB ENCOUNTER (OUTPATIENT)
Dept: LAB | Age: 80
End: 2020-04-23
Attending: INTERNAL MEDICINE
Payer: MEDICARE

## 2020-04-23 DIAGNOSIS — E11.65 UNCONTROLLED TYPE 2 DIABETES MELLITUS WITH HYPERGLYCEMIA (HCC): ICD-10-CM

## 2020-04-23 PROCEDURE — 82570 ASSAY OF URINE CREATININE: CPT

## 2020-04-23 PROCEDURE — 80061 LIPID PANEL: CPT

## 2020-04-23 PROCEDURE — 82043 UR ALBUMIN QUANTITATIVE: CPT

## 2020-04-23 PROCEDURE — 80053 COMPREHEN METABOLIC PANEL: CPT

## 2020-04-23 PROCEDURE — 83036 HEMOGLOBIN GLYCOSYLATED A1C: CPT

## 2020-04-23 PROCEDURE — 36415 COLL VENOUS BLD VENIPUNCTURE: CPT

## 2020-04-24 NOTE — PROGRESS NOTES
CMP Normal (electrolytes, and liver functions), decline in kidney function is stable but still declined. No motrin, ibuprofen, advil, alleve, naprosyn  with these medications. Would add a little Amaryl 1 mg twice a day to help with sugars.   Careful with lo

## 2020-05-16 DIAGNOSIS — N50.89 SWOLLEN TESTICLE: ICD-10-CM

## 2020-05-17 RX ORDER — LISINOPRIL 5 MG/1
TABLET ORAL
Qty: 90 TABLET | Refills: 0 | Status: SHIPPED | OUTPATIENT
Start: 2020-05-17 | End: 2020-08-21

## 2020-05-26 ENCOUNTER — TELEPHONE (OUTPATIENT)
Dept: INTERNAL MEDICINE CLINIC | Facility: CLINIC | Age: 80
End: 2020-05-26

## 2020-05-26 DIAGNOSIS — E11.65 UNCONTROLLED TYPE 2 DIABETES MELLITUS WITH HYPERGLYCEMIA (HCC): Primary | ICD-10-CM

## 2020-05-26 RX ORDER — LANCETS 30 GAUGE
EACH MISCELLANEOUS
Qty: 50 EACH | Refills: 6 | Status: SHIPPED | OUTPATIENT
Start: 2020-05-26

## 2020-05-26 RX ORDER — GLIMEPIRIDE 1 MG/1
TABLET ORAL
Qty: 180 TABLET | Refills: 0 | Status: SHIPPED | OUTPATIENT
Start: 2020-05-26 | End: 2020-09-08

## 2020-05-26 NOTE — TELEPHONE ENCOUNTER
Message from Pharmacy:  Pts insurance prefers Accu-Chek products, we received Rx for test strips but we still need Rx for Machine and Lancet.

## 2020-05-26 NOTE — TELEPHONE ENCOUNTER
Strips Prescription sent on May 17 and received by the pharmacy on May 17. This did send for the lancets.   Just now

## 2020-05-27 ENCOUNTER — TELEPHONE (OUTPATIENT)
Dept: INTERNAL MEDICINE CLINIC | Facility: CLINIC | Age: 80
End: 2020-05-27

## 2020-05-27 DIAGNOSIS — E11.65 UNCONTROLLED TYPE 2 DIABETES MELLITUS WITH HYPERGLYCEMIA (HCC): ICD-10-CM

## 2020-05-27 RX ORDER — BLOOD-GLUCOSE METER
KIT MISCELLANEOUS
Qty: 1 KIT | Refills: 0 | Status: SHIPPED | OUTPATIENT
Start: 2020-05-27

## 2020-05-27 NOTE — TELEPHONE ENCOUNTER
Patient called the pharmacy to check on prescriptions. Pharmacy informed patient that they need to know how often the patient should be checking his blood glucose.

## 2020-05-27 NOTE — TELEPHONE ENCOUNTER
Please clarify the directions for checking BS. Strips and needles rx reads \"qam\" and the meter reads \"check twice daily. \"

## 2020-07-08 PROBLEM — Z71.85 VACCINE COUNSELING: Status: ACTIVE | Noted: 2020-07-08

## 2020-07-08 PROBLEM — E11.9 TYPE 2 DIABETES MELLITUS WITHOUT COMPLICATION, WITHOUT LONG-TERM CURRENT USE OF INSULIN (HCC): Status: ACTIVE | Noted: 2019-11-30

## 2020-07-09 ENCOUNTER — OFFICE VISIT (OUTPATIENT)
Dept: INTERNAL MEDICINE CLINIC | Facility: CLINIC | Age: 80
End: 2020-07-09
Payer: MEDICARE

## 2020-07-09 VITALS
OXYGEN SATURATION: 98 % | BODY MASS INDEX: 28.59 KG/M2 | HEIGHT: 68 IN | DIASTOLIC BLOOD PRESSURE: 79 MMHG | SYSTOLIC BLOOD PRESSURE: 122 MMHG | TEMPERATURE: 98 F | WEIGHT: 188.63 LBS | RESPIRATION RATE: 19 BRPM | HEART RATE: 82 BPM

## 2020-07-09 DIAGNOSIS — Z85.46 PERSONAL HISTORY OF PROSTATE CANCER: ICD-10-CM

## 2020-07-09 DIAGNOSIS — R93.41 ABNORMAL COMPUTED TOMOGRAPHY OF URETER: ICD-10-CM

## 2020-07-09 DIAGNOSIS — N13.2 HYDRONEPHROSIS WITH URINARY OBSTRUCTION DUE TO RENAL CALCULUS: ICD-10-CM

## 2020-07-09 DIAGNOSIS — I10 ESSENTIAL HYPERTENSION: Primary | ICD-10-CM

## 2020-07-09 DIAGNOSIS — H40.009 BORDERLINE GLAUCOMA, UNSPECIFIED LATERALITY: ICD-10-CM

## 2020-07-09 DIAGNOSIS — M21.371 RIGHT FOOT DROP: ICD-10-CM

## 2020-07-09 DIAGNOSIS — M48.062 LUMBAR STENOSIS WITH NEUROGENIC CLAUDICATION: ICD-10-CM

## 2020-07-09 DIAGNOSIS — Z00.00 ENCOUNTER FOR ANNUAL HEALTH EXAMINATION: ICD-10-CM

## 2020-07-09 DIAGNOSIS — Z91.013 ALLERGY TO SEAFOOD: ICD-10-CM

## 2020-07-09 DIAGNOSIS — N13.5 URETERAL STRICTURE: ICD-10-CM

## 2020-07-09 DIAGNOSIS — E11.9 TYPE 2 DIABETES MELLITUS WITHOUT COMPLICATION, WITHOUT LONG-TERM CURRENT USE OF INSULIN (HCC): ICD-10-CM

## 2020-07-09 DIAGNOSIS — Z80.0 FAMILY HISTORY OF COLON CANCER: ICD-10-CM

## 2020-07-09 DIAGNOSIS — D50.8 OTHER IRON DEFICIENCY ANEMIA: ICD-10-CM

## 2020-07-09 DIAGNOSIS — E78.2 MIXED HYPERLIPIDEMIA: ICD-10-CM

## 2020-07-09 DIAGNOSIS — D80.1 HYPOGAMMAGLOBULINEMIA (HCC): ICD-10-CM

## 2020-07-09 DIAGNOSIS — Z71.85 VACCINE COUNSELING: ICD-10-CM

## 2020-07-09 LAB
APPEARANCE: CLEAR
MULTISTIX EXPIRATION DATE: NORMAL DATE
MULTISTIX LOT#: 1044 NUMERIC
PH, URINE: 6 (ref 4.5–8)
SPECIFIC GRAVITY: 1.02 (ref 1–1.03)
URINE-COLOR: YELLOW
UROBILINOGEN,SEMI-QN: 0.2 MG/DL (ref 0–1.9)

## 2020-07-09 PROCEDURE — G0439 PPPS, SUBSEQ VISIT: HCPCS | Performed by: INTERNAL MEDICINE

## 2020-07-09 PROCEDURE — 96160 PT-FOCUSED HLTH RISK ASSMT: CPT | Performed by: INTERNAL MEDICINE

## 2020-07-09 PROCEDURE — 3008F BODY MASS INDEX DOCD: CPT | Performed by: INTERNAL MEDICINE

## 2020-07-09 PROCEDURE — 3078F DIAST BP <80 MM HG: CPT | Performed by: INTERNAL MEDICINE

## 2020-07-09 PROCEDURE — 81002 URINALYSIS NONAUTO W/O SCOPE: CPT | Performed by: INTERNAL MEDICINE

## 2020-07-09 PROCEDURE — 99397 PER PM REEVAL EST PAT 65+ YR: CPT | Performed by: INTERNAL MEDICINE

## 2020-07-09 PROCEDURE — 3074F SYST BP LT 130 MM HG: CPT | Performed by: INTERNAL MEDICINE

## 2020-07-09 NOTE — PATIENT INSTRUCTIONS
ASSESSMENT AND OTHER RELEVANT CHRONIC CONDITIONS:   Jennie Reece is a 67year old male who presents for a Medicare Assessment. PLAN SUMMARY:   Diagnoses and all orders for this visit:    Essential hypertension Good control.  Careful with diet and 04/23/2020 8.1 (H)    No flowsheet data found.     Fasting Blood Sugar (FSB)   Patient must be diagnosed with one of the following:   • Hypertension   • Dyslipidemia   • Obesity (BMI ³30 kg/m2)   • Previous elevated impaired FBS or GTT   … or any two of t uncomfortable   Glaucoma Screening      Ophthalmology Visit   Covered annually for Diabetics, people with Glaucoma family history,   Americans over age 48   Americans over age 72 Data entered on: 7/17/2019   Last Dilated Eye Exam 7/17/2019 This may be covered with your pharmacy  prescription benefits     Recommended Websites for Advanced Directives    [email or web address]  An information packet, including necessary form from the [facility]

## 2020-07-09 NOTE — PROGRESS NOTES
HPI:    Patient ID: Marci Vogel is a 67year old male. Marci Vogel is a 67year old male who presents for a complete physical exam.   HPI:     Patient here for follow up of Diabetes. Has been taking medications regularly.     Checks sugars 1 lb (81.6 kg)    BP Readings from Last 3 Encounters:  07/09/20 : 122/79  03/04/20 : 121/76  01/06/20 : 122/70    Labs:   Lab Results   Component Value Date/Time     (H) 04/23/2020 08:07 AM     04/23/2020 08:07 AM    K 5.1 04/23/2020 08:07 AM Kit Please dispense unit covered by insurance, glucometer, lancets and test strips. Code: E11.9. Checks qam. 1 kit 0   • GLIMEPIRIDE 1 MG Oral Tab Take 1 tablet by mouth twice daily 180 tablet 0   • Lancets Does not apply Misc Dx. E11.9.  Checks qam. 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        Past Surgical History:   Procedure Laterality Date   • APPENDECTOMY     • CHOLECYSTECTOMY     • COLONOSCOPY  09/2013    osis   • COLONOSCOPY minutes three times weekly. Discussed regular exercise, low fat diet, The patient indicates understanding of these issues and agrees to the plan. The patient is asked to return for annual physical in 1 year.      Wt Readings from Last 3 Encounters:  07/0 vomiting. Endocrine: Negative for cold intolerance, heat intolerance, polydipsia, polyphagia and polyuria. Genitourinary: Negative.   Negative for decreased urine volume, difficulty urinating, discharge, dysuria, flank pain, frequency, genital sores, he 81 MG Oral Tab Take 81 mg by mouth daily. • Calcium Carbonate-Vitamin D (CALCIUM 600+D) 600-200 MG-UNIT Oral Tab Take by mouth. • Ascorbic Acid (VITAMIN C OR) Take by mouth. • Multiple Vitamins-Minerals (MULTIVITAMIN OR) Take by mouth.      • Gl Performed by Navid Perea MD at [facility] MAIN OR   • CYSTOSCOPY URETEROSCOPY Left 9/13/2018    Performed by Navid Perea MD at [address] [place] [address] MAIN OR   • EGD  09/2013    stricture and large HH w Char Pain erosions   • INGUINAL HERNIA REPAIR Right 2005   • LUMBAR LA retracted and not bulging. Tympanic membrane mobility is normal. No middle ear effusion. No hemotympanum. No decreased hearing is noted. Left Ear: Hearing, tympanic membrane, external ear and ear canal normal. No lacerations.  No drainage, swelling or ten tracheal deviation present. No thyroid mass and no thyromegaly present. Cardiovascular: Normal rate, regular rhythm, S1 normal, S2 normal, normal heart sounds, intact distal pulses and normal pulses.    Pulses:       Carotid pulses are 2+ on the right logan normal mood and affect. His speech is normal and behavior is normal. Cognition and memory are normal.   Nursing note and vitals reviewed.   Bilateral barefoot skin diabetic exam is normal, visualized feet and the appearance is normal.  Bilateral sensation o Care Planning elements today (up to 30 minutes for CPT 05833)         He does have a POA but we do NOT have it on file in [address] [place] [address] Rd.    Advance care planning including the explanation and discussion of advance directives standard forms performed Face to Face wit (83 kg)  01/06/20 : 180 lb (81.6 kg)     Last Cholesterol Labs:   Lab Results   Component Value Date    CHOLEST 117 04/23/2020    HDL 41 04/23/2020    LDL 51 04/23/2020    TRIG 124 04/23/2020          Last Chemistry Labs:   Lab Results   Component Value Da Testing twice a day   Diagnosis E11.65  HYDROcodone-acetaminophen 5-325 MG Oral Tab, Take 1 tablet by mouth every 6 (six) hours as needed. No alcohol or driving on this med. Stop if lethargic or hallucinating.   ibuprofen 600 MG Oral Tab, Take 1 tablet (600 hearing over the telephone:  No I have trouble following the conversations when two or more people are talking at the same time:  No   I have trouble understanding things on the TV:  No I have to strain to understand conversations:  No   I have to worry ab AND OTHER RELEVANT CHRONIC CONDITIONS:   Benjy Shepard is a 67year old male who presents for a Medicare Assessment. PLAN SUMMARY:   Diagnoses and all orders for this visit:    Essential hypertension Good control.  Careful with diet and excercise a claudication. Improved. Diet assessment: fair     PLAN:  The patient indicates understanding of these issues and agrees to the plan. Reinforced healthy diet, lifestyle, and exercise. No follow-ups on file. Lorri Campbell.  Kirsten Ram MD, 7/9/2020     Gen Activity if applicable)     Influenza  Covered Annually 9/23/2019   Please get every year    Pneumococcal 13 (Prevnar)  Covered Once after 65 11/24/2015 Please get once after your 65th birthday    Pneumococcal 23 (Pneumovax)  Covered Once after 65 11/06/20 data found. RTC 4 months for F/U DM.

## 2020-07-16 PROBLEM — N13.2 HYDRONEPHROSIS WITH URINARY OBSTRUCTION DUE TO RENAL CALCULUS: Status: RESOLVED | Noted: 2018-09-20 | Resolved: 2020-07-16

## 2020-07-16 PROBLEM — N20.0 KIDNEY STONE: Status: RESOLVED | Noted: 2018-09-20 | Resolved: 2020-07-16

## 2020-07-16 PROBLEM — M21.379 FOOTDROP: Status: ACTIVE | Noted: 2020-07-16

## 2020-07-16 PROBLEM — D73.5 SPLENIC INFARCT: Status: RESOLVED | Noted: 2018-05-29 | Resolved: 2020-07-16

## 2020-07-16 PROBLEM — I73.9 CLAUDICATION OF CALF MUSCLES (HCC): Status: RESOLVED | Noted: 2018-09-04 | Resolved: 2020-07-16

## 2020-07-16 PROBLEM — E87.5 HYPERKALEMIA: Status: RESOLVED | Noted: 2018-09-20 | Resolved: 2020-07-16

## 2020-07-16 PROBLEM — I73.9 CLAUDICATION OF CALF MUSCLES: Status: RESOLVED | Noted: 2018-09-04 | Resolved: 2020-07-16

## 2020-07-18 RX ORDER — ATORVASTATIN CALCIUM 40 MG/1
TABLET, FILM COATED ORAL
Qty: 90 TABLET | Refills: 1 | Status: SHIPPED | OUTPATIENT
Start: 2020-07-18 | End: 2020-09-08

## 2020-07-20 ENCOUNTER — TELEPHONE (OUTPATIENT)
Dept: INTERNAL MEDICINE CLINIC | Facility: CLINIC | Age: 80
End: 2020-07-20

## 2020-07-20 DIAGNOSIS — Z01.00 EYE EXAM, ROUTINE: Primary | ICD-10-CM

## 2020-07-20 DIAGNOSIS — E11.9 TYPE 2 DIABETES MELLITUS WITHOUT COMPLICATION, WITHOUT LONG-TERM CURRENT USE OF INSULIN (HCC): ICD-10-CM

## 2020-07-20 NOTE — TELEPHONE ENCOUNTER
Patient called and advised he has an appointment with [facility] on 7/22 at 1:30 PM.      Please sent a referral to them for his appointment,      [address]   [phone] [facility]: [phone]    Please Advise.

## 2020-07-20 NOTE — TELEPHONE ENCOUNTER
Dr. Nicole Jiang, patient is requesting a referral to Dr. Francisco Javier Price. Referral has been pended, please advise.

## 2020-08-08 ENCOUNTER — HOSPITAL ENCOUNTER (EMERGENCY)
Facility: HOSPITAL | Age: 80
Discharge: HOME | End: 2020-08-08
Attending: EMERGENCY MEDICINE
Payer: MEDICARE

## 2020-08-08 ENCOUNTER — APPOINTMENT (OUTPATIENT)
Dept: ULTRASOUND IMAGING | Facility: HOSPITAL | Age: 80
End: 2020-08-08
Attending: EMERGENCY MEDICINE
Payer: MEDICARE

## 2020-08-08 VITALS
SYSTOLIC BLOOD PRESSURE: 164 MMHG | OXYGEN SATURATION: 97 % | TEMPERATURE: 98 F | DIASTOLIC BLOOD PRESSURE: 100 MMHG | HEIGHT: 68 IN | HEART RATE: 81 BPM | RESPIRATION RATE: 18 BRPM | WEIGHT: 177 LBS | BODY MASS INDEX: 26.83 KG/M2

## 2020-08-08 DIAGNOSIS — M79.661 RIGHT CALF PAIN: Primary | ICD-10-CM

## 2020-08-08 PROCEDURE — 99284 EMERGENCY DEPT VISIT MOD MDM: CPT

## 2020-08-08 PROCEDURE — 93971 EXTREMITY STUDY: CPT | Performed by: EMERGENCY MEDICINE

## 2020-08-08 RX ORDER — ACETAMINOPHEN 500 MG
1000 TABLET ORAL ONCE
Status: COMPLETED | OUTPATIENT
Start: 2020-08-08 | End: 2020-08-08

## 2020-08-08 NOTE — ED INITIAL ASSESSMENT (HPI)
Atraumatic R shin pain for a few hours, reports pain is dull and intermittent 2/10. Ambulatory in triage, worse with lying down.

## 2020-08-08 NOTE — ED PROVIDER NOTES
Patient Seen in: [facility] Emergency Department      History   No chief complaint on file. Stated Complaint: Leg Pain    HPI    30-year-old male presents for evaluation of right shin pain.   Patient reports he was awoken from sleep last night w Review of Systems    Positive for stated complaint: Leg Pain  Other systems are as noted in HPI. Constitutional and vital signs reviewed. All other systems reviewed and negative except as noted above.     Physical Exam     ED Triage Vitals [08/0 Doppler venous ultrasound of the right lower     extremity was performed in the usual manner. FINDINGS: The femoral and popliteal veins appear normal.  Normal flow was     demonstrated with color and pulsed Doppler.   Visualized portions of the Well-appearing patient, unremarkable ultrasound. Discharge with supportive care, outpatient follow-up. Discussed return precautions. Patient and wife verbalized understanding of this plan. .      Further Outpatient evaluation and treatment will be requir

## 2020-08-21 RX ORDER — LISINOPRIL 5 MG/1
TABLET ORAL
Qty: 90 TABLET | Refills: 0 | Status: SHIPPED | OUTPATIENT
Start: 2020-08-21 | End: 2020-09-08

## 2020-09-08 RX ORDER — LISINOPRIL 5 MG/1
5 TABLET ORAL DAILY
Qty: 90 TABLET | Refills: 0 | Status: SHIPPED | OUTPATIENT
Start: 2020-09-08 | End: 2020-09-10

## 2020-09-08 RX ORDER — GLIMEPIRIDE 1 MG/1
1 TABLET ORAL 2 TIMES DAILY
Qty: 180 TABLET | Refills: 0 | Status: SHIPPED | OUTPATIENT
Start: 2020-09-08 | End: 2020-09-10

## 2020-09-08 RX ORDER — ATORVASTATIN CALCIUM 40 MG/1
40 TABLET, FILM COATED ORAL DAILY
Qty: 90 TABLET | Refills: 1 | Status: SHIPPED | OUTPATIENT
Start: 2020-09-08 | End: 2020-09-10

## 2020-09-10 ENCOUNTER — TELEPHONE (OUTPATIENT)
Dept: INTERNAL MEDICINE CLINIC | Facility: CLINIC | Age: 80
End: 2020-09-10

## 2020-09-10 RX ORDER — GLIMEPIRIDE 1 MG/1
1 TABLET ORAL 2 TIMES DAILY
Qty: 180 TABLET | Refills: 0 | Status: SHIPPED | OUTPATIENT
Start: 2020-09-10 | End: 2020-11-06

## 2020-09-10 RX ORDER — ATORVASTATIN CALCIUM 40 MG/1
40 TABLET, FILM COATED ORAL DAILY
Qty: 90 TABLET | Refills: 1 | Status: SHIPPED | OUTPATIENT
Start: 2020-09-10 | End: 2021-02-17

## 2020-09-10 RX ORDER — LISINOPRIL 5 MG/1
5 TABLET ORAL DAILY
Qty: 90 TABLET | Refills: 0 | Status: SHIPPED | OUTPATIENT
Start: 2020-09-10 | End: 2020-12-25

## 2020-09-24 ENCOUNTER — HOSPITAL ENCOUNTER (OUTPATIENT)
Age: 80
Discharge: HOME | End: 2020-09-24
Attending: EMERGENCY MEDICINE
Payer: MEDICARE

## 2020-09-24 VITALS
DIASTOLIC BLOOD PRESSURE: 82 MMHG | OXYGEN SATURATION: 100 % | RESPIRATION RATE: 20 BRPM | HEART RATE: 74 BPM | TEMPERATURE: 97 F | SYSTOLIC BLOOD PRESSURE: 132 MMHG

## 2020-09-24 DIAGNOSIS — S61.001A AVULSION OF SKIN OF RIGHT THUMB, INITIAL ENCOUNTER: Primary | ICD-10-CM

## 2020-09-24 PROCEDURE — 99213 OFFICE O/P EST LOW 20 MIN: CPT | Performed by: EMERGENCY MEDICINE

## 2020-09-24 PROCEDURE — 90715 TDAP VACCINE 7 YRS/> IM: CPT | Performed by: EMERGENCY MEDICINE

## 2020-09-24 PROCEDURE — 90471 IMMUNIZATION ADMIN: CPT | Performed by: EMERGENCY MEDICINE

## 2020-09-24 NOTE — ED PROVIDER NOTES
Patient Seen in: [facility] In [address] [place] [address]    History   Patient presents with:  Laceration/Abrasion    Stated Complaint: Rt thumb inj/caught in wine opener    HPI    HPI: Arthur Tinoco is a 67year old male who presents after an i Meter Disp-Strips Does not apply Device,  Code: E11.9. Checks qam.   Blood Glucose Monitoring Suppl w/Device Does not apply Kit,  Code: E11.9. Checks qam.   Blood Glucose Monitoring Suppl (BLOOD GLUCOSE SYSTEM EPIFANIO) Does not apply Kit,  Code: E11.9.  Checks Tobacco Use      Smoking status: Former Smoker        Packs/day: 1.00        Types: Cigarettes        Quit date: 1998        Years since quittin.7      Smokeless tobacco: Never Used    Alcohol use: Yes      Comment: rarely    Drug use: No      Rev

## 2020-09-24 NOTE — ED INITIAL ASSESSMENT (HPI)
Pt presents with a laceration/abrasion x 2 days. Pt states he injured finger while opening a bottle of wine.    No documentation of TD vaccine in EMR

## 2020-10-01 ENCOUNTER — TELEPHONE (OUTPATIENT)
Dept: INTERNAL MEDICINE CLINIC | Facility: CLINIC | Age: 80
End: 2020-10-01

## 2020-10-01 PROBLEM — H04.123 DRY EYE SYNDROME OF BILATERAL LACRIMAL GLANDS: Status: ACTIVE | Noted: 2020-10-01

## 2020-10-01 NOTE — TELEPHONE ENCOUNTER
When he is scheduled for cataract surgery. His appointment with me is November 24, 2020. Preop should be done at least 2 weeks before surgery. There is a 130 slot which is appointment is 1:00.  130 is a TCM but we can book her if she needs for acute?

## 2020-10-01 NOTE — TELEPHONE ENCOUNTER
Spoke with patient ( verified)--reports he was seen at [facility] 2020 for cut on thumb--healed without issue. Patient saw Dr. Francisco Javier Price this morning--needs to return to her office for measurement for cataract surgery--see below.     Patient asking if he and his topography.    After Visit Summary (Automatic SnapShot taken 10/1/2020)

## 2020-10-01 NOTE — TELEPHONE ENCOUNTER
Patient does not know yet when he is scheduled he has an appointment on Monday with Dr. Laura Willett  He will call back on Monday with date

## 2020-10-05 ENCOUNTER — TELEPHONE (OUTPATIENT)
Dept: INTERNAL MEDICINE CLINIC | Facility: CLINIC | Age: 80
End: 2020-10-05

## 2020-10-23 ENCOUNTER — LAB ENCOUNTER (OUTPATIENT)
Dept: LAB | Age: 80
End: 2020-10-23
Attending: INTERNAL MEDICINE
Payer: MEDICARE

## 2020-10-23 ENCOUNTER — TELEPHONE (OUTPATIENT)
Dept: INTERNAL MEDICINE CLINIC | Facility: CLINIC | Age: 80
End: 2020-10-23

## 2020-10-23 DIAGNOSIS — E11.9 TYPE 2 DIABETES MELLITUS WITHOUT COMPLICATION, WITHOUT LONG-TERM CURRENT USE OF INSULIN (HCC): Primary | ICD-10-CM

## 2020-10-23 DIAGNOSIS — E11.9 TYPE 2 DIABETES MELLITUS WITHOUT COMPLICATION, WITHOUT LONG-TERM CURRENT USE OF INSULIN (HCC): ICD-10-CM

## 2020-10-23 PROCEDURE — 36415 COLL VENOUS BLD VENIPUNCTURE: CPT

## 2020-10-23 PROCEDURE — 83036 HEMOGLOBIN GLYCOSYLATED A1C: CPT

## 2020-10-23 NOTE — TELEPHONE ENCOUNTER
Pt states that he received a message stating that he is due to get his hemoglobin A1C tested. Pt would like to confirm this. Pt would like a call back today if this need to be done.

## 2020-10-23 NOTE — TELEPHONE ENCOUNTER
Spoke with patient, Central scheduling number provided [phone], order is in the computer and non fasting test,verbalized understanding.

## 2020-10-23 NOTE — TELEPHONE ENCOUNTER
Dr Griggs=See Helen's note below. , patient is schedule for surgery in December 2020. Last AIC was 4/23/20,[facility] is [facility], do you want to order one before calling the patient?

## 2020-11-02 ENCOUNTER — IMMUNIZATION (OUTPATIENT)
Dept: INTERNAL MEDICINE CLINIC | Facility: CLINIC | Age: 80
End: 2020-11-02
Payer: MEDICARE

## 2020-11-02 ENCOUNTER — MED REC SCAN ONLY (OUTPATIENT)
Dept: INTERNAL MEDICINE CLINIC | Facility: CLINIC | Age: 80
End: 2020-11-02

## 2020-11-02 DIAGNOSIS — Z23 NEED FOR VACCINATION: ICD-10-CM

## 2020-11-02 PROCEDURE — G0008 ADMIN INFLUENZA VIRUS VAC: HCPCS | Performed by: INTERNAL MEDICINE

## 2020-11-02 PROCEDURE — 90662 IIV NO PRSV INCREASED AG IM: CPT | Performed by: INTERNAL MEDICINE

## 2020-11-06 RX ORDER — GLIMEPIRIDE 1 MG/1
1 TABLET ORAL 2 TIMES DAILY
Qty: 180 TABLET | Refills: 0 | Status: SHIPPED | OUTPATIENT
Start: 2020-11-06 | End: 2021-01-11

## 2020-11-23 PROBLEM — N18.30 CKD (CHRONIC KIDNEY DISEASE) STAGE 3, GFR 30-59 ML/MIN (HCC): Chronic | Status: ACTIVE | Noted: 2020-11-23

## 2020-11-24 ENCOUNTER — OFFICE VISIT (OUTPATIENT)
Dept: INTERNAL MEDICINE CLINIC | Facility: CLINIC | Age: 80
End: 2020-11-24
Payer: MEDICARE

## 2020-11-24 ENCOUNTER — TELEPHONE (OUTPATIENT)
Dept: INTERNAL MEDICINE CLINIC | Facility: CLINIC | Age: 80
End: 2020-11-24

## 2020-11-24 ENCOUNTER — LAB ENCOUNTER (OUTPATIENT)
Dept: LAB | Age: 80
End: 2020-11-24
Attending: INTERNAL MEDICINE
Payer: MEDICARE

## 2020-11-24 VITALS
DIASTOLIC BLOOD PRESSURE: 82 MMHG | TEMPERATURE: 98 F | HEIGHT: 68 IN | OXYGEN SATURATION: 98 % | HEART RATE: 85 BPM | BODY MASS INDEX: 28.64 KG/M2 | RESPIRATION RATE: 19 BRPM | WEIGHT: 189 LBS | SYSTOLIC BLOOD PRESSURE: 124 MMHG

## 2020-11-24 DIAGNOSIS — E11.9 TYPE 2 DIABETES MELLITUS WITHOUT COMPLICATION, WITHOUT LONG-TERM CURRENT USE OF INSULIN (HCC): ICD-10-CM

## 2020-11-24 DIAGNOSIS — D50.8 OTHER IRON DEFICIENCY ANEMIA: ICD-10-CM

## 2020-11-24 DIAGNOSIS — I10 ESSENTIAL HYPERTENSION: Primary | ICD-10-CM

## 2020-11-24 DIAGNOSIS — Z71.85 VACCINE COUNSELING: ICD-10-CM

## 2020-11-24 DIAGNOSIS — H25.9 AGE-RELATED CATARACT OF BOTH EYES, UNSPECIFIED AGE-RELATED CATARACT TYPE: ICD-10-CM

## 2020-11-24 LAB
ALBUMIN SERPL-MCNC: 4 G/DL (ref 3.4–5)
ALBUMIN/GLOB SERPL: 1.2 {RATIO} (ref 1–2)
ALP LIVER SERPL-CCNC: 90 U/L
ALT SERPL-CCNC: 37 U/L
ANION GAP SERPL CALC-SCNC: 4 MMOL/L (ref 0–18)
AST SERPL-CCNC: 24 U/L (ref 15–37)
BASOPHILS # BLD AUTO: 0.06 X10(3) UL (ref 0–0.2)
BASOPHILS NFR BLD AUTO: 0.9 %
BILIRUB SERPL-MCNC: 0.3 MG/DL (ref 0.1–2)
BUN BLD-MCNC: 25 MG/DL (ref 7–18)
BUN/CREAT SERPL: 15.5 (ref 10–20)
CALCIUM BLD-MCNC: 10.3 MG/DL (ref 8.5–10.1)
CHLORIDE SERPL-SCNC: 108 MMOL/L (ref 98–112)
CO2 SERPL-SCNC: 30 MMOL/L (ref 21–32)
CREAT BLD-MCNC: 1.61 MG/DL
DEPRECATED RDW RBC AUTO: 47.8 FL (ref 35.1–46.3)
EOSINOPHIL # BLD AUTO: 0.83 X10(3) UL (ref 0–0.7)
EOSINOPHIL NFR BLD AUTO: 11.8 %
ERYTHROCYTE [DISTWIDTH] IN BLOOD BY AUTOMATED COUNT: 14.2 % (ref 11–15)
GLOBULIN PLAS-MCNC: 3.4 G/DL (ref 2.8–4.4)
GLUCOSE BLD-MCNC: 214 MG/DL (ref 70–99)
HCT VFR BLD AUTO: 40.7 %
HGB BLD-MCNC: 12.8 G/DL
IMM GRANULOCYTES # BLD AUTO: 0.01 X10(3) UL (ref 0–1)
IMM GRANULOCYTES NFR BLD: 0.1 %
INR BLD: 0.85 (ref 0.9–1.2)
LYMPHOCYTES # BLD AUTO: 1.63 X10(3) UL (ref 1–4)
LYMPHOCYTES NFR BLD AUTO: 23.2 %
M PROTEIN MFR SERPL ELPH: 7.4 G/DL (ref 6.4–8.2)
MCH RBC QN AUTO: 29.1 PG (ref 26–34)
MCHC RBC AUTO-ENTMCNC: 31.4 G/DL (ref 31–37)
MCV RBC AUTO: 92.5 FL
MONOCYTES # BLD AUTO: 0.6 X10(3) UL (ref 0.1–1)
MONOCYTES NFR BLD AUTO: 8.5 %
NEUTROPHILS # BLD AUTO: 3.91 X10 (3) UL (ref 1.5–7.7)
NEUTROPHILS # BLD AUTO: 3.91 X10(3) UL (ref 1.5–7.7)
NEUTROPHILS NFR BLD AUTO: 55.5 %
OSMOLALITY SERPL CALC.SUM OF ELEC: 305 MOSM/KG (ref 275–295)
PATIENT FASTING Y/N/NP: NO
PLATELET # BLD AUTO: 344 10(3)UL (ref 150–450)
POTASSIUM SERPL-SCNC: 5.1 MMOL/L (ref 3.5–5.1)
PROTHROMBIN TIME: 11.5 SECONDS (ref 11.8–14.5)
RBC # BLD AUTO: 4.4 X10(6)UL
SODIUM SERPL-SCNC: 142 MMOL/L (ref 136–145)
WBC # BLD AUTO: 7 X10(3) UL (ref 4–11)

## 2020-11-24 PROCEDURE — 3074F SYST BP LT 130 MM HG: CPT | Performed by: INTERNAL MEDICINE

## 2020-11-24 PROCEDURE — 93000 ELECTROCARDIOGRAM COMPLETE: CPT | Performed by: INTERNAL MEDICINE

## 2020-11-24 PROCEDURE — G0009 ADMIN PNEUMOCOCCAL VACCINE: HCPCS | Performed by: INTERNAL MEDICINE

## 2020-11-24 PROCEDURE — 81003 URINALYSIS AUTO W/O SCOPE: CPT | Performed by: INTERNAL MEDICINE

## 2020-11-24 PROCEDURE — 80053 COMPREHEN METABOLIC PANEL: CPT

## 2020-11-24 PROCEDURE — 36415 COLL VENOUS BLD VENIPUNCTURE: CPT

## 2020-11-24 PROCEDURE — 99215 OFFICE O/P EST HI 40 MIN: CPT | Performed by: INTERNAL MEDICINE

## 2020-11-24 PROCEDURE — 3079F DIAST BP 80-89 MM HG: CPT | Performed by: INTERNAL MEDICINE

## 2020-11-24 PROCEDURE — 85610 PROTHROMBIN TIME: CPT

## 2020-11-24 PROCEDURE — 3008F BODY MASS INDEX DOCD: CPT | Performed by: INTERNAL MEDICINE

## 2020-11-24 PROCEDURE — 85025 COMPLETE CBC W/AUTO DIFF WBC: CPT

## 2020-11-24 PROCEDURE — 90732 PPSV23 VACC 2 YRS+ SUBQ/IM: CPT | Performed by: INTERNAL MEDICINE

## 2020-11-24 NOTE — PROGRESS NOTES
HPI:    Patient ID: Cate Sidhu is a 67year old male.     Patient presents with:  Pre-Op Exam     Cate Sidhu is a 67year old male who presents for a pre-operative physical exam. Patient is to have cataract L, to be done by Dr. Carl Zamora at [facility] ONETOUCH LANCETS Does not apply Misc Testing twice a day   Diagnosis E11.65 100 each 3   • HYDROcodone-acetaminophen 5-325 MG Oral Tab Take 1 tablet by mouth every 6 (six) hours as needed. No alcohol or driving on this med.  Stop if lethargic or hallucinati Cancer Father         lung   • Cancer Sister         colon   • Other (Other) Mother         alzheimers      Social History    Socioeconomic History      Marital status:       Spouse name: Not on file      Number of children: Not on file      Years o - - - -   Last Eye Exam - - - 10/1/2020 10/1/2020   Eye Doctor Name - - - - Tonja Jc   Eye Exam Location - - - - [place]   Eye Exam Retinopathy - - - No -   PHQ2 Score - - - - -   PHQ2 Score - - - - -   PHQ2 Score (Depression/Suicide Screening) - - - optimized for surgery and perioperative medication recommendations are outlined above.      Assessment:  Essential hypertension  (primary encounter diagnosis)  Other iron deficiency anemia  Type 2 diabetes mellitus without complication, without long-term cu CREATSERUM 1.44 (H) 04/23/2020 08:07 AM    CA 9.6 04/23/2020 08:07 AM    AST 26 04/23/2020 08:07 AM    ALT 43 04/23/2020 08:07 AM    TSH 4.89 03/08/2017 01:44 PM        Lab Results   Component Value Date/Time    CHOLEST 117 04/23/2020 08:07 AM    HDL 41 04 Negative. Negative for agitation, behavioral problems, confusion, decreased concentration, dysphoric mood, hallucinations, self-injury, sleep disturbance and suicidal ideas. The patient is not nervous/anxious and is not hyperactive.     All other systems r HYDROcodone-acetaminophen 5-325 MG Oral Tab Take 1 tablet by mouth every 6 (six) hours as needed. No alcohol or driving on this med. Stop if lethargic or hallucinating.  30 tablet 0   • ibuprofen 600 MG Oral Tab Take 1 tablet (600 mg total) by mouth every 6 alzheimers      Social History:   Social History    Tobacco Use      Smoking status: Former Smoker        Packs/day: 1.00        Types: Cigarettes        Quit date: 1998        Years since quittin.9      Smokeless tobacco: Never Used    Al tenderness and no frontal sinus tenderness. Left sinus exhibits no maxillary sinus tenderness and no frontal sinus tenderness. Patient wearing mask. Did not have patient remove mask due to current Covid virus situation.    Eyes: Pupils are equal, round tenderness. There is no rigidity, no rebound, no guarding and no CVA tenderness. Musculoskeletal:         General: No edema.    Lymphadenopathy:        Head (right side): No submental, no submandibular, no preauricular, no posterior auricular and no occip with fruits and natural sugars. One serving a day and no more than 1 handful every day. Check feet  every AM and careful with sores and ulcers on feet bilaterally. Check eyes every year with dilated eye exam.  Check sugars.   2-hour postmeal should be less t

## 2020-11-24 NOTE — PATIENT INSTRUCTIONS
ASSESSMENT/PLAN:   Essential hypertension  (primary encounter diagnosis) Doing good. Careful with diet and excercise at least 30 minutes 3-4 times a week. Check blood pressures at different times on different days. Can purchase own blood pressure monitor. W Differential W Platelet [E]      Comp Metabolic Panel (14) [E]      PTT, Activated [E]      Prothrombin Time (PT) [E]      PNEUMOCOCCAL IMM, 23      Meds This Visit:  Requested Prescriptions      No prescriptions requested or ordered in this encounter

## 2020-12-04 ENCOUNTER — LAB ENCOUNTER (OUTPATIENT)
Dept: LAB | Age: 80
End: 2020-12-04
Attending: INTERNAL MEDICINE
Payer: MEDICARE

## 2020-12-04 DIAGNOSIS — D64.9 ANEMIA, UNSPECIFIED TYPE: ICD-10-CM

## 2020-12-04 PROCEDURE — 82272 OCCULT BLD FECES 1-3 TESTS: CPT

## 2020-12-15 ENCOUNTER — TELEPHONE (OUTPATIENT)
Dept: INTERNAL MEDICINE CLINIC | Facility: CLINIC | Age: 80
End: 2020-12-15

## 2020-12-15 DIAGNOSIS — M54.50 LOW BACK PAIN, UNSPECIFIED BACK PAIN LATERALITY, UNSPECIFIED CHRONICITY, UNSPECIFIED WHETHER SCIATICA PRESENT: Primary | ICD-10-CM

## 2020-12-15 NOTE — TELEPHONE ENCOUNTER
Dr. Malia Mora, patient is requesting a referral to Dr. Shantel William. Referral has been pended, please advise.

## 2020-12-15 NOTE — TELEPHONE ENCOUNTER
Patient stated he has been experiencing lower back pain for the last 2 weeks. Patient is requesting a referral to see orthopedic, Dr. Laura Eubanks at the [facility].

## 2020-12-21 ENCOUNTER — OFFICE VISIT (OUTPATIENT)
Dept: ORTHOPEDICS CLINIC | Facility: CLINIC | Age: 80
End: 2020-12-21
Payer: MEDICARE

## 2020-12-21 ENCOUNTER — HOSPITAL ENCOUNTER (OUTPATIENT)
Dept: GENERAL RADIOLOGY | Facility: HOSPITAL | Age: 80
Discharge: HOME | End: 2020-12-21
Attending: ORTHOPAEDIC SURGERY
Payer: MEDICARE

## 2020-12-21 VITALS — HEIGHT: 68 IN | WEIGHT: 184 LBS | BODY MASS INDEX: 27.89 KG/M2

## 2020-12-21 DIAGNOSIS — S39.012A STRAIN OF LUMBAR REGION, INITIAL ENCOUNTER: Primary | ICD-10-CM

## 2020-12-21 DIAGNOSIS — R52 PAIN: ICD-10-CM

## 2020-12-21 DIAGNOSIS — M21.371 RIGHT FOOT DROP: ICD-10-CM

## 2020-12-21 PROCEDURE — 99204 OFFICE O/P NEW MOD 45 MIN: CPT | Performed by: ORTHOPAEDIC SURGERY

## 2020-12-21 PROCEDURE — 3008F BODY MASS INDEX DOCD: CPT | Performed by: ORTHOPAEDIC SURGERY

## 2020-12-21 PROCEDURE — 72100 X-RAY EXAM L-S SPINE 2/3 VWS: CPT | Performed by: ORTHOPAEDIC SURGERY

## 2020-12-21 NOTE — H&P
NURSING INTAKE COMMENTS: Patient presents with:  Consult: lower back pain- pt states pain started 2 wks ago. He states he was doing a lot of remodeling at the time, but denies any injury. pt seen at [facility] office before and had L-spine sx a few yrs ago.  He sta 9/13/2018    Performed by Divya Wright MD at [facility] OR   • [place] Left 9/13/2018    Performed by Divya Wright MD at [facility] OR   • CYSTOSCOPY URETEROSCOPY Left 9/13/2018    Performed by Divya Wright MD at [facility] OR   • EGD Calcium Carbonate-Vitamin D (CALCIUM 600+D) 600-200 MG-UNIT Oral Tab Take by mouth. • Ascorbic Acid (VITAMIN C OR) Take by mouth. • Multiple Vitamins-Minerals (MULTIVITAMIN OR) Take by mouth.      • Glucose Blood In Vitro Strip One Touch ultra blue denies chest pain  GI: no hematemesis, no worsening heartburn, no diarrhea  : no dysuria, no blood in urine, no difficulty urinating, no incontinence  MUSCULOSKELETAL: no other musculoskeletal complaints other than in HPI  NEURO: no numbness or tingling, LUMBAR SPINE (MIN 2 VIEWS) (CPT=72100); Future    Right foot drop        Assessment: Lumbar strain essentially resolved currently. Plan: I recommended weight loss and continue the home exercise program that he has been doing.   If he has recurrent pain,

## 2020-12-25 RX ORDER — LISINOPRIL 5 MG/1
5 TABLET ORAL DAILY
Qty: 90 TABLET | Refills: 0 | Status: SHIPPED | OUTPATIENT
Start: 2020-12-25 | End: 2021-02-24

## 2021-01-11 RX ORDER — GLIMEPIRIDE 1 MG/1
1 TABLET ORAL 2 TIMES DAILY
Qty: 180 TABLET | Refills: 0 | Status: SHIPPED | OUTPATIENT
Start: 2021-01-11 | End: 2021-04-22

## 2021-02-05 ENCOUNTER — IMMUNIZATION (OUTPATIENT)
Dept: LAB | Facility: HOSPITAL | Age: 81
End: 2021-02-05
Attending: EMERGENCY MEDICINE
Payer: MEDICARE

## 2021-02-05 DIAGNOSIS — Z23 NEED FOR VACCINATION: Primary | ICD-10-CM

## 2021-02-05 PROCEDURE — 0011A SARSCOV2 VAC 100MCG/0.5ML IM: CPT

## 2021-02-17 RX ORDER — ATORVASTATIN CALCIUM 40 MG/1
TABLET, FILM COATED ORAL
Qty: 90 TABLET | Refills: 1 | Status: SHIPPED | OUTPATIENT
Start: 2021-02-17 | End: 2021-09-21

## 2021-02-24 RX ORDER — LISINOPRIL 5 MG/1
5 TABLET ORAL DAILY
Qty: 90 TABLET | Refills: 0 | Status: SHIPPED | OUTPATIENT
Start: 2021-02-24 | End: 2021-05-03

## 2021-03-06 ENCOUNTER — IMMUNIZATION (OUTPATIENT)
Dept: LAB | Facility: HOSPITAL | Age: 81
End: 2021-03-06
Attending: EMERGENCY MEDICINE
Payer: MEDICARE

## 2021-03-06 DIAGNOSIS — Z23 NEED FOR VACCINATION: Primary | ICD-10-CM

## 2021-03-06 PROCEDURE — 0012A SARSCOV2 VAC 100MCG/0.5ML IM: CPT

## 2021-04-22 RX ORDER — GLIMEPIRIDE 1 MG/1
1 TABLET ORAL 2 TIMES DAILY
Qty: 180 TABLET | Refills: 0 | Status: SHIPPED | OUTPATIENT
Start: 2021-04-22 | End: 2021-06-21

## 2021-05-03 RX ORDER — LISINOPRIL 5 MG/1
5 TABLET ORAL DAILY
Qty: 90 TABLET | Refills: 0 | Status: SHIPPED | OUTPATIENT
Start: 2021-05-03 | End: 2021-07-16

## 2021-05-12 ENCOUNTER — TELEPHONE (OUTPATIENT)
Dept: INTERNAL MEDICINE CLINIC | Facility: CLINIC | Age: 81
End: 2021-05-12

## 2021-05-12 DIAGNOSIS — S39.012A STRAIN OF LUMBAR REGION, INITIAL ENCOUNTER: Primary | ICD-10-CM

## 2021-05-12 DIAGNOSIS — M21.371 RIGHT FOOT DROP: ICD-10-CM

## 2021-05-12 DIAGNOSIS — R52 PAIN: ICD-10-CM

## 2021-05-18 ENCOUNTER — HOSPITAL ENCOUNTER (OUTPATIENT)
Dept: GENERAL RADIOLOGY | Facility: HOSPITAL | Age: 81
Discharge: HOME | End: 2021-05-18
Attending: ORTHOPAEDIC SURGERY
Payer: MEDICARE

## 2021-05-18 ENCOUNTER — OFFICE VISIT (OUTPATIENT)
Dept: ORTHOPEDICS CLINIC | Facility: CLINIC | Age: 81
End: 2021-05-18
Payer: MEDICARE

## 2021-05-18 VITALS — HEART RATE: 77 BPM | SYSTOLIC BLOOD PRESSURE: 115 MMHG | DIASTOLIC BLOOD PRESSURE: 75 MMHG | RESPIRATION RATE: 20 BRPM

## 2021-05-18 DIAGNOSIS — M25.561 RIGHT KNEE PAIN, UNSPECIFIED CHRONICITY: ICD-10-CM

## 2021-05-18 DIAGNOSIS — S83.281A ACUTE LATERAL MENISCUS TEAR OF RIGHT KNEE, INITIAL ENCOUNTER: Primary | ICD-10-CM

## 2021-05-18 PROBLEM — G89.29 CHRONIC PAIN OF RIGHT KNEE: Status: ACTIVE | Noted: 2021-05-18

## 2021-05-18 PROCEDURE — 3078F DIAST BP <80 MM HG: CPT | Performed by: ORTHOPAEDIC SURGERY

## 2021-05-18 PROCEDURE — 99214 OFFICE O/P EST MOD 30 MIN: CPT | Performed by: ORTHOPAEDIC SURGERY

## 2021-05-18 PROCEDURE — 3074F SYST BP LT 130 MM HG: CPT | Performed by: ORTHOPAEDIC SURGERY

## 2021-05-18 PROCEDURE — 20610 DRAIN/INJ JOINT/BURSA W/O US: CPT | Performed by: ORTHOPAEDIC SURGERY

## 2021-05-18 PROCEDURE — 73564 X-RAY EXAM KNEE 4 OR MORE: CPT | Performed by: ORTHOPAEDIC SURGERY

## 2021-05-18 RX ORDER — TRIAMCINOLONE ACETONIDE 40 MG/ML
40 INJECTION, SUSPENSION INTRA-ARTICULAR; INTRAMUSCULAR ONCE
Status: COMPLETED | OUTPATIENT
Start: 2021-05-18 | End: 2021-05-18

## 2021-05-18 RX ADMIN — TRIAMCINOLONE ACETONIDE 40 MG: 40 INJECTION, SUSPENSION INTRA-ARTICULAR; INTRAMUSCULAR at 11:11:00

## 2021-05-18 NOTE — H&P
NURSING INTAKE COMMENTS: Patient presents with:  Knee Pain: Right - states he was seen in the private office and had a cortisone inj he dose not remember when - onset 5/12/21 when he turned and his knee did not turn and he felt pain and some swelling - he DAY 90 tablet 1   • ofloxacin 0.3 % Ophthalmic Solution Place 1 drop into the left eye every 2 (two) hours while awake.  Use after surgery as directed 1 Bottle 0   • prednisoLONE acetate (PRED FORTE) 1 % Ophthalmic Suspension Place 1 drop into the left eye w/Device Does not apply Kit Testing twice a day    Diagnosis E11.65 1 kit 0       Shellfish-Derived P*    UNKNOWN  Family History   Problem Relation Age of Onset   • Heart Attack Father         CAD   • Cancer Father         lung   • Cancer Sister         c there was no medial tenderness. Musculoskeletal: As above. Neurological: He has an old right foot drop but no acute neurologic deficits. Imaging: X-rays taken the office today showed good maintenance of the joint spaces in both left and right knees. lateral meniscus tear. Plan: Recommended cortisone injection today which she accepted. Aspiration was negative and he tolerated the injection bupivacaine 0.5% 4 cc and Kenalog 1 cc well. He will call in a week.   If he is not improved, he will have MRI

## 2021-05-24 ENCOUNTER — OFFICE VISIT (OUTPATIENT)
Dept: INTERNAL MEDICINE CLINIC | Facility: CLINIC | Age: 81
End: 2021-05-24
Payer: MEDICARE

## 2021-05-24 VITALS
HEIGHT: 68 IN | HEART RATE: 83 BPM | DIASTOLIC BLOOD PRESSURE: 77 MMHG | BODY MASS INDEX: 28.46 KG/M2 | WEIGHT: 187.81 LBS | OXYGEN SATURATION: 98 % | TEMPERATURE: 98 F | SYSTOLIC BLOOD PRESSURE: 118 MMHG | RESPIRATION RATE: 18 BRPM

## 2021-05-24 DIAGNOSIS — M48.062 LUMBAR STENOSIS WITH NEUROGENIC CLAUDICATION: ICD-10-CM

## 2021-05-24 DIAGNOSIS — D50.8 OTHER IRON DEFICIENCY ANEMIA: ICD-10-CM

## 2021-05-24 DIAGNOSIS — M25.561 CHRONIC PAIN OF RIGHT KNEE: ICD-10-CM

## 2021-05-24 DIAGNOSIS — H40.009 BORDERLINE GLAUCOMA, UNSPECIFIED LATERALITY: ICD-10-CM

## 2021-05-24 DIAGNOSIS — G89.29 CHRONIC PAIN OF RIGHT KNEE: ICD-10-CM

## 2021-05-24 DIAGNOSIS — N13.5 URETERAL STRICTURE: ICD-10-CM

## 2021-05-24 DIAGNOSIS — Z91.013 ALLERGY TO SEAFOOD: ICD-10-CM

## 2021-05-24 DIAGNOSIS — E11.9 TYPE 2 DIABETES MELLITUS WITHOUT COMPLICATION, WITHOUT LONG-TERM CURRENT USE OF INSULIN (HCC): ICD-10-CM

## 2021-05-24 DIAGNOSIS — R76.11 POSITIVE PPD: ICD-10-CM

## 2021-05-24 DIAGNOSIS — N18.32 STAGE 3B CHRONIC KIDNEY DISEASE (HCC): Chronic | ICD-10-CM

## 2021-05-24 DIAGNOSIS — Z71.85 VACCINE COUNSELING: ICD-10-CM

## 2021-05-24 DIAGNOSIS — Z12.83 SKIN EXAM, SCREENING FOR CANCER: ICD-10-CM

## 2021-05-24 DIAGNOSIS — D80.1 HYPOGAMMAGLOBULINEMIA (HCC): ICD-10-CM

## 2021-05-24 DIAGNOSIS — I10 ESSENTIAL HYPERTENSION: ICD-10-CM

## 2021-05-24 DIAGNOSIS — Z80.0 FAMILY HISTORY OF COLON CANCER: ICD-10-CM

## 2021-05-24 DIAGNOSIS — Z86.010 HISTORY OF ADENOMATOUS POLYP OF COLON: ICD-10-CM

## 2021-05-24 DIAGNOSIS — H25.9 AGE-RELATED CATARACT OF BOTH EYES, UNSPECIFIED AGE-RELATED CATARACT TYPE: ICD-10-CM

## 2021-05-24 DIAGNOSIS — Z85.46 PERSONAL HISTORY OF PROSTATE CANCER: ICD-10-CM

## 2021-05-24 DIAGNOSIS — M21.371 RIGHT FOOT DROP: ICD-10-CM

## 2021-05-24 DIAGNOSIS — H04.123 DRY EYE SYNDROME OF BILATERAL LACRIMAL GLANDS: ICD-10-CM

## 2021-05-24 DIAGNOSIS — E78.2 MIXED HYPERLIPIDEMIA: ICD-10-CM

## 2021-05-24 DIAGNOSIS — Z00.00 ENCOUNTER FOR ANNUAL HEALTH EXAMINATION: ICD-10-CM

## 2021-05-24 DIAGNOSIS — R93.41 ABNORMAL COMPUTED TOMOGRAPHY OF URETER: Primary | ICD-10-CM

## 2021-05-24 PROCEDURE — 96160 PT-FOCUSED HLTH RISK ASSMT: CPT | Performed by: INTERNAL MEDICINE

## 2021-05-24 PROCEDURE — 81003 URINALYSIS AUTO W/O SCOPE: CPT | Performed by: INTERNAL MEDICINE

## 2021-05-24 PROCEDURE — 99397 PER PM REEVAL EST PAT 65+ YR: CPT | Performed by: INTERNAL MEDICINE

## 2021-05-24 PROCEDURE — 3074F SYST BP LT 130 MM HG: CPT | Performed by: INTERNAL MEDICINE

## 2021-05-24 PROCEDURE — 3078F DIAST BP <80 MM HG: CPT | Performed by: INTERNAL MEDICINE

## 2021-05-24 PROCEDURE — G0439 PPPS, SUBSEQ VISIT: HCPCS | Performed by: INTERNAL MEDICINE

## 2021-05-24 PROCEDURE — 3008F BODY MASS INDEX DOCD: CPT | Performed by: INTERNAL MEDICINE

## 2021-05-24 NOTE — PATIENT INSTRUCTIONS
ASSESSMENT/PLAN:   Abnormal computed tomography of ureter  (primary encounter diagnosis) Sees urologist.     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 Vaccine counseling Check on shingrix vaccine coverage. Encounter for annual health examination Check urine. Skin exam, screening for cancer Follow up with derm.      Orders Placed This Encounter      Lipid Panel along the nerve pathway where the virus is reactivating. ? The rash can also form around an eye, along one side of the face or neck, or in the mouth.   ? In a few people, often those with a weak immune system, shingles appear on more than one part of the rash can reduce the chance of postherpetic neuralgia. Other medicines can be prescribed to help ease the pain and improve quality of life. · Bacterial infection. Shingles blisters may get infected with bacteria.  Depending on the severity of the infection, get the second RZV shot 2 to 6 months after the first. The vaccine makes it less likely that you will develop shingles. If you do develop shingles, your symptoms will likely be milder than if you hadn’t been vaccinated.  Main Cutler is also advised even if you had bothersome):  · chills  · headache  · fever  · nausea, vomiting  · redness, warmth, pain, swelling or itching at site where injected  · tiredness  What may interact with this medicine?     · medicines that suppress your immune system  · medicines to treat c Sugar (FSB)   Patient must be diagnosed with one of the following:   • Hypertension   • Dyslipidemia   • Obesity (BMI ³30 kg/m2)   • Previous elevated impaired FBS or GTT   … or any two of the following:   • Overweight (BMI ³25 but <30)   • Family history Ophthalmology Visit   Covered annually for Diabetics, people with Glaucoma family history,   Americans over age 48   Americans over age 72 Data entered on: 10/1/2020   Last Dilated Eye Exam 10/1/2020    OK to schedule if you are in this risk cover unless Medically needed    Zoster (Not covered by Medicare Part B) No orders found for this or any previous visit. This may be covered with your pharmacy  prescription benefits     Recommended Websites for Advanced Directives    [email or web address]

## 2021-05-24 NOTE — PROGRESS NOTES
HPI:    Patient ID: Freida Ty is a 79year old male. Freida Ty is a 79year old male who presents for a complete physical exam.   HPI:       Patient here for follow up of Diabetes. Has been taking medications regularly.     Checks sugars 04/23/2020 43   10/25/2019 35      Current Outpatient Medications   Medication Sig Dispense Refill   • lisinopril 5 MG Oral Tab Take 1 tablet (5 mg total) by mouth daily.  90 tablet 0   • glimepiride 1 MG Oral Tab Take 1 tablet (1 mg total) by mouth 2 (tw Calcium Citrate-Vitamin D 315-250 MG-UNIT Oral Tab Take 1 tablet by mouth 2 (two) times daily with meals.  60 tablet 0   • Blood Glucose Monitoring Suppl ([address]) w/Device Does not apply Kit Testing twice a day    Diagnosis E11.65 1 kit 0   • week.  Diet: watches minimally     Hypertension  Patient is here for follow up of hypertension. BP at home: not check. Has been compliant with medications. Exercise level: exercises 5 times a  week and has been following low salt diet.   Weight has been s Encounters:  05/24/21 : 118/77  05/18/21 : 115/75  11/24/20 : 124/82    Labs:   Lab Results   Component Value Date/Time     (H) 12/09/2020 10:53 AM     11/24/2020 02:18 PM    K 5.1 11/24/2020 02:18 PM     11/24/2020 02:18 PM    CO2 30.0 Negative for color change, pallor, rash and wound. Neurological: Negative. Negative for dizziness, tremors, seizures, syncope, facial asymmetry, speech difficulty, weakness, light-headedness, numbness and headaches. Psychiatric/Behavioral: Negative. (CALCIUM 600+D) 600-200 MG-UNIT Oral Tab Take by mouth. • Ascorbic Acid (VITAMIN C OR) Take by mouth. • Multiple Vitamins-Minerals (MULTIVITAMIN OR) Take by mouth.      • Glucose Blood In Vitro Strip One Touch ultra blue test strips to check glucose Family History   Problem Relation Age of Onset   • Heart Attack Father         CAD   • Cancer Father         lung   • Cancer Sister         colon   • Other (Other) Mother         alzheimers      Social History:   Social History    Tobacco Use      Smokin injected, scarred, perforated, erythematous, retracted or bulging. Tympanic membrane has normal mobility. Nose:      Right Sinus: No maxillary sinus tenderness or frontal sinus tenderness.       Left Sinus: No maxillary sinus tenderness or frontal sinu Palpations: Abdomen is soft. Abdomen is not rigid. Tenderness: There is no abdominal tenderness. There is no guarding or rebound. Musculoskeletal:      Cervical back: Neck supple.    Lymphadenopathy:      Head:      Right side of head: No submen at least 30 minutes 3-4 times a week. Check blood pressures at different times on different days. Can purchase own blood pressure monitor. If not, check at local pharmacy. Bake foods more and grill occasionally. Avoid fried foods. No salt.  Use other season Auto, w/o scope [46109]      Meds This Visit:  Requested Prescriptions      No prescriptions requested or ordered in this encounter       Imaging & Referrals:  OPHTHALMOLOGY - INTERNAL  DERM - INTERNAL     RTC for 6 months.         HPI:   Ping Castro Never Used         CAGE screening score of 0 on 5/18/2021, showing low risk of alcohol abuse.          Patient Care Team: Patient Care Team:  Buddy Luevano MD as PCP - General (Internal Medicine)  Buddy Luevano [place] [facility]  Corinne Shire, total) by mouth daily. glimepiride 1 MG Oral Tab, Take 1 tablet (1 mg total) by mouth 2 (two) times daily.   METFORMIN HCL 1000 MG Oral Tab, TAKE 1 TABLET TWICE DAILY  ATORVASTATIN 40 MG Oral Tab, TAKE 1 TABLET EVERY DAY  ofloxacin 0.3 % Ophthalmic Solutio transfusion, Hypogammaglobulinemia ([facility] [address] Inguinal hernia, right (2005), and Positive PPD (2003).     He  has a past surgical history that includes Inguinal hernia repair (Right, 2005); cholecystectomy; appendectomy; other surgical history (2001); colonosco understanding the speaker in a large room such as at a meeting or place of [place]: No   Many people I talk to seem to mumble (or don't speak clearly): No People get annoyed because I misunderstand what they say: No   I misunderstand what others are saying SCOPE    Dry eye syndrome of bilateral lacrimal glands    Essential hypertension    Family history of colon cancer    History of adenomatous polyp of colon    Right foot drop    Mixed hyperlipidemia  -     LIPID PANEL;  Future  -     COMP METABOLIC PANEL (1 found.     Fasting Blood Sugar (FSB)Annually Glucose (POC) (mg/dL)   Date Value   12/09/2020 106 (H)       Cardiovascular Disease Screening     LDL Annually LDL Cholesterol (mg/dL)   Date Value   04/23/2020 51        EKG - w/ Initial Preventative Physical E unless Medically needed    Zoster   Not covered by Medicare Part B No vaccine history found This may be covered with your pharmacy  prescription benefits      [facility] [facility] Internal Lab or Procedure External Lab or Procedure      Annual Monit

## 2021-05-28 ENCOUNTER — LAB ENCOUNTER (OUTPATIENT)
Dept: LAB | Age: 81
End: 2021-05-28
Attending: INTERNAL MEDICINE
Payer: MEDICARE

## 2021-05-28 DIAGNOSIS — E11.9 TYPE 2 DIABETES MELLITUS WITHOUT COMPLICATION, WITHOUT LONG-TERM CURRENT USE OF INSULIN (HCC): ICD-10-CM

## 2021-05-28 DIAGNOSIS — E78.2 MIXED HYPERLIPIDEMIA: ICD-10-CM

## 2021-05-28 DIAGNOSIS — D50.8 OTHER IRON DEFICIENCY ANEMIA: ICD-10-CM

## 2021-05-28 PROCEDURE — 82728 ASSAY OF FERRITIN: CPT

## 2021-05-28 PROCEDURE — 83540 ASSAY OF IRON: CPT

## 2021-05-28 PROCEDURE — 80053 COMPREHEN METABOLIC PANEL: CPT

## 2021-05-28 PROCEDURE — 83036 HEMOGLOBIN GLYCOSYLATED A1C: CPT

## 2021-05-28 PROCEDURE — 80061 LIPID PANEL: CPT

## 2021-05-28 PROCEDURE — 84466 ASSAY OF TRANSFERRIN: CPT

## 2021-05-28 PROCEDURE — 85025 COMPLETE CBC W/AUTO DIFF WBC: CPT

## 2021-05-28 PROCEDURE — 36415 COLL VENOUS BLD VENIPUNCTURE: CPT

## 2021-06-07 ENCOUNTER — TELEPHONE (OUTPATIENT)
Dept: INTERNAL MEDICINE CLINIC | Facility: CLINIC | Age: 81
End: 2021-06-07

## 2021-06-07 DIAGNOSIS — E11.9 TYPE 2 DIABETES MELLITUS WITHOUT COMPLICATION, WITHOUT LONG-TERM CURRENT USE OF INSULIN (HCC): Primary | ICD-10-CM

## 2021-06-07 NOTE — TELEPHONE ENCOUNTER
Patient would like another recommendation for a referral to see an Endocrinologist.  Dr. Paul Hou does not have availability until November, 2021. Patient would like to see if Dr. Rudy Multani has any openings before November. Please call and advise.   Yanique Payne

## 2021-06-08 ENCOUNTER — TELEPHONE (OUTPATIENT)
Dept: INTERNAL MEDICINE CLINIC | Facility: CLINIC | Age: 81
End: 2021-06-08

## 2021-06-08 NOTE — TELEPHONE ENCOUNTER
Spoke with the patient,verified full name and , informed patient of message and instructions below no further questions

## 2021-06-08 NOTE — TELEPHONE ENCOUNTER
Patient calling reports was recommended to take Folic acid 1mg and cannot  find any at 2 different pharmacies     Pharmacist states needs an RX for this strength     Asking if he can take Folic acid 899 mcg dose can buy this OTC     IF needed RX for Folic

## 2021-06-09 ENCOUNTER — TELEPHONE (OUTPATIENT)
Dept: ENDOCRINOLOGY | Facility: HOSPITAL | Age: 81
End: 2021-06-09

## 2021-06-09 DIAGNOSIS — E11.65 UNCONTROLLED TYPE 2 DIABETES MELLITUS WITH HYPERGLYCEMIA (HCC): Primary | ICD-10-CM

## 2021-06-09 NOTE — TELEPHONE ENCOUNTER
Can you please sign attached pended referral for patient? There is an updated order for Medicare requirements.  Thank you, Harjinder Rizo

## 2021-06-11 ENCOUNTER — HOSPITAL ENCOUNTER (OUTPATIENT)
Dept: ENDOCRINOLOGY | Facility: HOSPITAL | Age: 81
Discharge: HOME | End: 2021-06-11
Attending: INTERNAL MEDICINE
Payer: MEDICARE

## 2021-06-11 DIAGNOSIS — E11.65 UNCONTROLLED TYPE 2 DIABETES MELLITUS WITH HYPERGLYCEMIA (HCC): Primary | ICD-10-CM

## 2021-06-11 PROCEDURE — 97802 MEDICAL NUTRITION INDIV IN: CPT

## 2021-06-11 NOTE — PROGRESS NOTES
Medical Nutrition Therapy Assessment    Ping Castro 12/6/1940 was seen for individual Diabetic Medical Nutrition Therapy/ initial/ individual:    Date: 6/11/2021   Start time 1100  End time: 36    Assessment  Pt states that he is unsure why he was Provided:    Discussed appropriate portion sizes for foods that pt typically eats- fruit, cereal, lean beef   [] discussed healthy weight management, glucose management, lipid management, and BP management as related to diabetes   [x] discussed basic meal above 70 mg/dl, retreat. Call Leonel Dixon RD at [phone] (option 3) for problems/concerns. Pt will fax BG chart to [facility] after checking 2 hr pp BG each day.     Leonel Dixon RD

## 2021-06-11 NOTE — PATIENT INSTRUCTIONS
Goals     1. EDC - Problem Solving (pt-stated)       Note created  6/11/2021 12:09 PM by Nell Serrano RD      I will check BG once daily, focusing on 2 hrs after a meal, and will eat each of my typical breakfasts for 3 days to see effect.

## 2021-06-20 RX ORDER — BLOOD SUGAR DIAGNOSTIC
STRIP MISCELLANEOUS
Qty: 100 EACH | Refills: 11 | Status: SHIPPED | OUTPATIENT
Start: 2021-06-20

## 2021-06-21 ENCOUNTER — TELEPHONE (OUTPATIENT)
Dept: INTERNAL MEDICINE CLINIC | Facility: CLINIC | Age: 81
End: 2021-06-21

## 2021-06-21 RX ORDER — LANCETS 30 GAUGE
EACH MISCELLANEOUS
Qty: 50 EACH | Refills: 11 | Status: SHIPPED | OUTPATIENT
Start: 2021-06-21 | End: 2021-06-21

## 2021-06-21 RX ORDER — LANCETS 30 GAUGE
EACH MISCELLANEOUS
Qty: 100 EACH | Refills: 11 | Status: SHIPPED | OUTPATIENT
Start: 2021-06-21

## 2021-06-21 RX ORDER — GLIMEPIRIDE 1 MG/1
1 TABLET ORAL 2 TIMES DAILY
Qty: 180 TABLET | Refills: 0 | Status: SHIPPED | OUTPATIENT
Start: 2021-06-21 | End: 2021-12-21

## 2021-06-21 RX ORDER — BLOOD-GLUCOSE METER
EACH MISCELLANEOUS
Qty: 1 KIT | Refills: 0 | Status: SHIPPED | OUTPATIENT
Start: 2021-06-21

## 2021-06-21 RX ORDER — BLOOD SUGAR DIAGNOSTIC
STRIP MISCELLANEOUS
Qty: 50 STRIP | Refills: 11 | Status: SHIPPED | OUTPATIENT
Start: 2021-06-21

## 2021-06-21 NOTE — TELEPHONE ENCOUNTER
Pharmacy called and advised the Lancets do NOT come in a Quantity of 50. They only come in a 100 Pack. Can we please send the a new script for the Lancets? Please Advise.        Please Use Pharmacy: [address], [address]

## 2021-06-25 ENCOUNTER — TELEPHONE (OUTPATIENT)
Dept: INTERNAL MEDICINE CLINIC | Facility: CLINIC | Age: 81
End: 2021-06-25

## 2021-06-25 NOTE — TELEPHONE ENCOUNTER
Pt states he had lab work done a few weeks ago and was told he was anemic. Pt he has been taking iron as advised. He is concerned because last night he had a black stool, states no bright red bleeding noted.   Advised pt iron supplements may turn the stool

## 2021-07-16 RX ORDER — LISINOPRIL 5 MG/1
TABLET ORAL
Qty: 90 TABLET | Refills: 0 | Status: SHIPPED | OUTPATIENT
Start: 2021-07-16 | End: 2021-09-16

## 2021-08-30 ENCOUNTER — LAB ENCOUNTER (OUTPATIENT)
Dept: LAB | Age: 81
End: 2021-08-30
Attending: INTERNAL MEDICINE
Payer: MEDICARE

## 2021-08-30 DIAGNOSIS — E55.9 VITAMIN D DEFICIENCY: ICD-10-CM

## 2021-08-30 DIAGNOSIS — E16.2 HYPOGLYCEMIA, UNSPECIFIED: ICD-10-CM

## 2021-08-30 DIAGNOSIS — E11.65 TYPE II DIABETES MELLITUS, UNCONTROLLED (HCC): Primary | ICD-10-CM

## 2021-08-30 DIAGNOSIS — E11.9 TYPE 2 DIABETES MELLITUS WITHOUT COMPLICATION, WITHOUT LONG-TERM CURRENT USE OF INSULIN (HCC): ICD-10-CM

## 2021-08-30 DIAGNOSIS — I10 HYPERTENSION: ICD-10-CM

## 2021-08-30 DIAGNOSIS — D50.8 OTHER IRON DEFICIENCY ANEMIA: ICD-10-CM

## 2021-08-30 DIAGNOSIS — E78.5 HYPERLIPIDEMIA: ICD-10-CM

## 2021-08-30 LAB
ALBUMIN SERPL-MCNC: 3.8 G/DL (ref 3.4–5)
ALBUMIN/GLOB SERPL: 1.2 {RATIO} (ref 1–2)
ALP LIVER SERPL-CCNC: 76 U/L
ALT SERPL-CCNC: 31 U/L
ANION GAP SERPL CALC-SCNC: 5 MMOL/L (ref 0–18)
AST SERPL-CCNC: 17 U/L (ref 15–37)
BASOPHILS # BLD AUTO: 0.06 X10(3) UL (ref 0–0.2)
BASOPHILS NFR BLD AUTO: 0.9 %
BILIRUB SERPL-MCNC: 0.4 MG/DL (ref 0.1–2)
BUN BLD-MCNC: 25 MG/DL (ref 7–18)
BUN/CREAT SERPL: 16.6 (ref 10–20)
CALCIUM BLD-MCNC: 10 MG/DL (ref 8.5–10.1)
CHLORIDE SERPL-SCNC: 105 MMOL/L (ref 98–112)
CO2 SERPL-SCNC: 28 MMOL/L (ref 21–32)
CREAT BLD-MCNC: 1.51 MG/DL
DEPRECATED HBV CORE AB SER IA-ACNC: 14.2 NG/ML
DEPRECATED RDW RBC AUTO: 48.9 FL (ref 35.1–46.3)
EOSINOPHIL # BLD AUTO: 0.44 X10(3) UL (ref 0–0.7)
EOSINOPHIL NFR BLD AUTO: 6.4 %
ERYTHROCYTE [DISTWIDTH] IN BLOOD BY AUTOMATED COUNT: 13.8 % (ref 11–15)
EST. AVERAGE GLUCOSE BLD GHB EST-MCNC: 160 MG/DL (ref 68–126)
GLOBULIN PLAS-MCNC: 3.3 G/DL (ref 2.8–4.4)
GLUCOSE BLD-MCNC: 146 MG/DL (ref 70–99)
HBA1C MFR BLD HPLC: 7.2 % (ref ?–5.7)
HCT VFR BLD AUTO: 44.1 %
HGB BLD-MCNC: 14 G/DL
IMM GRANULOCYTES # BLD AUTO: 0.01 X10(3) UL (ref 0–1)
IMM GRANULOCYTES NFR BLD: 0.1 %
IRON SATURATION: 19 %
IRON SERPL-MCNC: 80 UG/DL
LYMPHOCYTES # BLD AUTO: 1.38 X10(3) UL (ref 1–4)
LYMPHOCYTES NFR BLD AUTO: 20.1 %
M PROTEIN MFR SERPL ELPH: 7.1 G/DL (ref 6.4–8.2)
MCH RBC QN AUTO: 30.3 PG (ref 26–34)
MCHC RBC AUTO-ENTMCNC: 31.7 G/DL (ref 31–37)
MCV RBC AUTO: 95.5 FL
MONOCYTES # BLD AUTO: 0.55 X10(3) UL (ref 0.1–1)
MONOCYTES NFR BLD AUTO: 8 %
NEUTROPHILS # BLD AUTO: 4.44 X10 (3) UL (ref 1.5–7.7)
NEUTROPHILS # BLD AUTO: 4.44 X10(3) UL (ref 1.5–7.7)
NEUTROPHILS NFR BLD AUTO: 64.5 %
OSMOLALITY SERPL CALC.SUM OF ELEC: 293 MOSM/KG (ref 275–295)
PATIENT FASTING Y/N/NP: NO
PLATELET # BLD AUTO: 302 10(3)UL (ref 150–450)
POTASSIUM SERPL-SCNC: 5 MMOL/L (ref 3.5–5.1)
RBC # BLD AUTO: 4.62 X10(6)UL
SODIUM SERPL-SCNC: 138 MMOL/L (ref 136–145)
TOTAL IRON BINDING CAPACITY: 425 UG/DL (ref 240–450)
TRANSFERRIN SERPL-MCNC: 285 MG/DL (ref 200–360)
WBC # BLD AUTO: 6.9 X10(3) UL (ref 4–11)

## 2021-08-30 PROCEDURE — 83036 HEMOGLOBIN GLYCOSYLATED A1C: CPT

## 2021-08-30 PROCEDURE — 84466 ASSAY OF TRANSFERRIN: CPT

## 2021-08-30 PROCEDURE — 82728 ASSAY OF FERRITIN: CPT

## 2021-08-30 PROCEDURE — 80053 COMPREHEN METABOLIC PANEL: CPT

## 2021-08-30 PROCEDURE — 85025 COMPLETE CBC W/AUTO DIFF WBC: CPT

## 2021-08-30 PROCEDURE — 83540 ASSAY OF IRON: CPT

## 2021-08-30 PROCEDURE — 36415 COLL VENOUS BLD VENIPUNCTURE: CPT

## 2021-09-16 RX ORDER — LISINOPRIL 5 MG/1
5 TABLET ORAL DAILY
Qty: 90 TABLET | Refills: 0 | Status: SHIPPED | OUTPATIENT
Start: 2021-09-16 | End: 2021-12-07

## 2021-09-16 NOTE — TELEPHONE ENCOUNTER
Please review; protocol failed or has no protocol    Requested Prescriptions   Pending Prescriptions Disp Refills    LISINOPRIL 5 MG Oral Tab [Pharmacy Med Name: LISINOPRIL 5 MG Tablet] 90 tablet 0     Sig: TAKE 1 TABLET EVERY DAY        Hypertensive Medications Protocol Failed - 9/15/2021  8:46 PM        Failed - GFR Non- > 50     Lab Results   Component Value Date    GFRNAA 43 (L) 08/30/2021                 Passed - CMP or BMP in past 12 months        Passed - Appointment in past 6 or next 3 months               Future Appointments         Provider Department Appt Notes    In 3 months Ngozi Mota MD [place] [facility], [address], [address] [facility]     In 8 months Melissa Ortega MD Ophthalmology [address] Return in about 1 year (around 6/7/2022) for complete exam.             Recent Outpatient Visits              3 months ago Type 2 diabetes mellitus without complication, without long-term current use of insulin [facility]    Ophthalmology [address], Alexa Anderson MD    Office Visit    3 months ago Abnormal computed tomography of ureter    [place] [facility] [place], [facility], [address], Rock Phelps MD    Office Visit    4 months ago Acute lateral meniscus tear of right knee, initial encounter    [facility] marcellus Bloom MD    Office Visit    8 months ago Pseudophakia of left eye    Ophthalmology - Glenn Fernando MD    Office Visit    8 months ago Strain of lumbar region, initial encounter    [facility] marcellus Bloom MD    Office Visit

## 2021-09-21 RX ORDER — ATORVASTATIN CALCIUM 40 MG/1
TABLET, FILM COATED ORAL
Qty: 90 TABLET | Refills: 1 | Status: SHIPPED | OUTPATIENT
Start: 2021-09-21 | End: 2022-03-17

## 2021-11-05 ENCOUNTER — TELEPHONE (OUTPATIENT)
Dept: INTERNAL MEDICINE CLINIC | Facility: CLINIC | Age: 81
End: 2021-11-05

## 2021-11-05 ENCOUNTER — IMMUNIZATION (OUTPATIENT)
Dept: LAB | Facility: HOSPITAL | Age: 81
End: 2021-11-05
Attending: EMERGENCY MEDICINE
Payer: MEDICARE

## 2021-11-05 DIAGNOSIS — Z23 NEED FOR VACCINATION: Primary | ICD-10-CM

## 2021-11-05 DIAGNOSIS — Z23 NEED FOR SHINGLES VACCINE: Primary | ICD-10-CM

## 2021-11-05 PROCEDURE — 0064A SARSCOV2 VAC 50MCG/0.25ML IM: CPT

## 2021-11-05 NOTE — TELEPHONE ENCOUNTER
Spoke to patient and informed him that the vaccine was ordered but he needs to call his insurance company to see if its covered at the pharmacy or at the doctors office.  He will call them to find out

## 2021-11-05 NOTE — TELEPHONE ENCOUNTER
Pt would like to have a shingles vaccine. Pt would like to know if it is one vaccine or two vaccines.

## 2021-11-05 NOTE — TELEPHONE ENCOUNTER
2 doses 2 to 6 months apart between the doses.   But he needs to check about insurance coverage and whether if insurance does cover whether they cover the doctor's office or the pharmacy better since it is labeled as a pharmaceutical that might be more of a

## 2021-11-05 NOTE — TELEPHONE ENCOUNTER
Spoke to patient and informed him shingrix is a 2 dose vaccine.      Shingrix vaccine has been pended for approval if appropriate

## 2021-11-05 NOTE — TELEPHONE ENCOUNTER
MONI-  Pt stated he call [facility], pharmacy price $39.66 but no price for Office, stated he will just get it at his local pharmacy , advised to call when he receive the vaccine

## 2021-11-30 ENCOUNTER — HOSPITAL ENCOUNTER (OUTPATIENT)
Age: 81
Discharge: HOME | End: 2021-11-30
Payer: MEDICARE

## 2021-11-30 ENCOUNTER — LAB ENCOUNTER (OUTPATIENT)
Dept: LAB | Age: 81
End: 2021-11-30
Attending: INTERNAL MEDICINE
Payer: MEDICARE

## 2021-11-30 DIAGNOSIS — N18.32 STAGE 3B CHRONIC KIDNEY DISEASE (HCC): ICD-10-CM

## 2021-11-30 DIAGNOSIS — E11.9 TYPE 2 DIABETES MELLITUS WITHOUT COMPLICATION, WITHOUT LONG-TERM CURRENT USE OF INSULIN (HCC): ICD-10-CM

## 2021-11-30 DIAGNOSIS — D50.8 OTHER IRON DEFICIENCY ANEMIA: ICD-10-CM

## 2021-11-30 PROCEDURE — 82728 ASSAY OF FERRITIN: CPT

## 2021-11-30 PROCEDURE — 90662 IIV NO PRSV INCREASED AG IM: CPT | Performed by: EMERGENCY MEDICINE

## 2021-11-30 PROCEDURE — 80048 BASIC METABOLIC PNL TOTAL CA: CPT

## 2021-11-30 PROCEDURE — 85025 COMPLETE CBC W/AUTO DIFF WBC: CPT

## 2021-11-30 PROCEDURE — 84466 ASSAY OF TRANSFERRIN: CPT

## 2021-11-30 PROCEDURE — 83036 HEMOGLOBIN GLYCOSYLATED A1C: CPT

## 2021-11-30 PROCEDURE — 83540 ASSAY OF IRON: CPT

## 2021-11-30 PROCEDURE — 36415 COLL VENOUS BLD VENIPUNCTURE: CPT

## 2021-11-30 PROCEDURE — G0008 ADMIN INFLUENZA VIRUS VAC: HCPCS | Performed by: EMERGENCY MEDICINE

## 2021-12-07 DIAGNOSIS — E11.9 TYPE 2 DIABETES MELLITUS WITHOUT COMPLICATION, WITHOUT LONG-TERM CURRENT USE OF INSULIN (HCC): Primary | ICD-10-CM

## 2021-12-07 NOTE — PROGRESS NOTES
CBC Normal (white blood cells and red blood cells and platelets), iron and iron storage levels are good. BMP Normal (electrolytes, sugar), but potassium is slightly elevated. Stop lisinopril. Check blood pressures.   Make sure they do not go above 135 s

## 2021-12-20 ENCOUNTER — LAB ENCOUNTER (OUTPATIENT)
Dept: LAB | Age: 81
End: 2021-12-20
Attending: INTERNAL MEDICINE
Payer: MEDICARE

## 2021-12-20 DIAGNOSIS — I10 HYPERTENSION: ICD-10-CM

## 2021-12-20 DIAGNOSIS — E11.65 TYPE II DIABETES MELLITUS, UNCONTROLLED (HCC): Primary | ICD-10-CM

## 2021-12-20 DIAGNOSIS — E78.2 MIXED HYPERLIPIDEMIA: ICD-10-CM

## 2021-12-20 DIAGNOSIS — E55.9 VITAMIN D DEFICIENCY: ICD-10-CM

## 2021-12-20 DIAGNOSIS — E16.2 HYPOGLYCEMIA, UNSPECIFIED: ICD-10-CM

## 2021-12-20 DIAGNOSIS — E87.5 HYPERKALEMIA: ICD-10-CM

## 2021-12-20 PROCEDURE — 84165 PROTEIN E-PHORESIS SERUM: CPT | Performed by: INTERNAL MEDICINE

## 2021-12-20 PROCEDURE — 36415 COLL VENOUS BLD VENIPUNCTURE: CPT

## 2021-12-20 PROCEDURE — 85025 COMPLETE CBC W/AUTO DIFF WBC: CPT

## 2021-12-20 PROCEDURE — 83735 ASSAY OF MAGNESIUM: CPT | Performed by: INTERNAL MEDICINE

## 2021-12-20 PROCEDURE — 84439 ASSAY OF FREE THYROXINE: CPT | Performed by: INTERNAL MEDICINE

## 2021-12-20 PROCEDURE — 84100 ASSAY OF PHOSPHORUS: CPT | Performed by: INTERNAL MEDICINE

## 2021-12-20 PROCEDURE — 83036 HEMOGLOBIN GLYCOSYLATED A1C: CPT

## 2021-12-20 PROCEDURE — 84443 ASSAY THYROID STIM HORMONE: CPT | Performed by: INTERNAL MEDICINE

## 2021-12-20 PROCEDURE — 82306 VITAMIN D 25 HYDROXY: CPT

## 2021-12-20 PROCEDURE — 83883 ASSAY NEPHELOMETRY NOT SPEC: CPT | Performed by: INTERNAL MEDICINE

## 2021-12-20 PROCEDURE — 80053 COMPREHEN METABOLIC PANEL: CPT

## 2021-12-21 PROBLEM — E83.42 HYPOMAGNESEMIA: Status: ACTIVE | Noted: 2021-12-21

## 2021-12-21 PROBLEM — E83.52 HYPERCALCEMIA: Status: ACTIVE | Noted: 2021-12-21

## 2021-12-21 PROBLEM — R79.89 ABNORMAL TSH: Status: ACTIVE | Noted: 2021-12-21

## 2021-12-21 NOTE — PROGRESS NOTES
CBC Normal (white blood cells and red blood cells and platelets),   CMP Normal (electrolytes, and liver functions), decline in kidney function is better but still declined. No motrin, ibuprofen, advil, alleve, naprosyn  with these medications.     Calcium is usually occurs on the tips of the toes as blackened areas of skin. The black area is dead tissue. In severe cases, gangrene spreads to involve the entire toe, other toes, and the entire foot. Foot or toe amputation may be required.  Good foot care and blood wound  Increasing foot or leg pain  New areas of redness or swelling or tender areas of the foot  Fever of 100.4°F (38°C) or greater  Date Last Reviewed: 5/25/2016  © 4898-2704 [employer]. [facility] [facility], [address] [place] [address].  All right describe amounts of food. If your health care provider uses a term you're not sure of, don't be afraid to ask.  It helps to know the difference between servings and portions:  A serving size is a fixed size. Food producers use this term to describe their pr professional medical care.  Always follow your healthcare professional's instructions.

## 2021-12-28 ENCOUNTER — OFFICE VISIT (OUTPATIENT)
Dept: INTERNAL MEDICINE CLINIC | Facility: CLINIC | Age: 81
End: 2021-12-28
Payer: MEDICARE

## 2021-12-28 VITALS
WEIGHT: 175.63 LBS | BODY MASS INDEX: 26.62 KG/M2 | DIASTOLIC BLOOD PRESSURE: 78 MMHG | SYSTOLIC BLOOD PRESSURE: 131 MMHG | RESPIRATION RATE: 17 BRPM | HEART RATE: 79 BPM | OXYGEN SATURATION: 97 % | TEMPERATURE: 97 F | HEIGHT: 68 IN

## 2021-12-28 DIAGNOSIS — I10 ESSENTIAL HYPERTENSION: ICD-10-CM

## 2021-12-28 DIAGNOSIS — E78.2 MIXED HYPERLIPIDEMIA: ICD-10-CM

## 2021-12-28 DIAGNOSIS — N13.5 URETERAL STRICTURE: ICD-10-CM

## 2021-12-28 DIAGNOSIS — E87.5 HYPERKALEMIA: ICD-10-CM

## 2021-12-28 DIAGNOSIS — Z85.46 PERSONAL HISTORY OF PROSTATE CANCER: ICD-10-CM

## 2021-12-28 DIAGNOSIS — N18.32 STAGE 3B CHRONIC KIDNEY DISEASE (HCC): Primary | Chronic | ICD-10-CM

## 2021-12-28 DIAGNOSIS — Z91.013 ALLERGY TO SEAFOOD: ICD-10-CM

## 2021-12-28 DIAGNOSIS — D50.8 OTHER IRON DEFICIENCY ANEMIA: ICD-10-CM

## 2021-12-28 DIAGNOSIS — M21.371 RIGHT FOOT DROP: ICD-10-CM

## 2021-12-28 DIAGNOSIS — M25.561 CHRONIC PAIN OF RIGHT KNEE: ICD-10-CM

## 2021-12-28 DIAGNOSIS — M48.062 LUMBAR STENOSIS WITH NEUROGENIC CLAUDICATION: ICD-10-CM

## 2021-12-28 DIAGNOSIS — H40.009 BORDERLINE GLAUCOMA, UNSPECIFIED LATERALITY: ICD-10-CM

## 2021-12-28 DIAGNOSIS — H25.9 AGE-RELATED CATARACT OF BOTH EYES, UNSPECIFIED AGE-RELATED CATARACT TYPE: ICD-10-CM

## 2021-12-28 DIAGNOSIS — E83.52 HYPERCALCEMIA: ICD-10-CM

## 2021-12-28 DIAGNOSIS — R76.11 POSITIVE PPD: ICD-10-CM

## 2021-12-28 DIAGNOSIS — G89.29 CHRONIC PAIN OF RIGHT KNEE: ICD-10-CM

## 2021-12-28 DIAGNOSIS — D80.1 HYPOGAMMAGLOBULINEMIA (HCC): ICD-10-CM

## 2021-12-28 DIAGNOSIS — R79.89 ABNORMAL TSH: ICD-10-CM

## 2021-12-28 DIAGNOSIS — Z00.00 ENCOUNTER FOR ANNUAL HEALTH EXAMINATION: ICD-10-CM

## 2021-12-28 DIAGNOSIS — E11.9 TYPE 2 DIABETES MELLITUS WITHOUT COMPLICATION, WITHOUT LONG-TERM CURRENT USE OF INSULIN (HCC): ICD-10-CM

## 2021-12-28 DIAGNOSIS — Z86.010 HISTORY OF ADENOMATOUS POLYP OF COLON: ICD-10-CM

## 2021-12-28 DIAGNOSIS — R93.41 ABNORMAL COMPUTED TOMOGRAPHY OF URETER: ICD-10-CM

## 2021-12-28 DIAGNOSIS — E83.42 HYPOMAGNESEMIA: ICD-10-CM

## 2021-12-28 DIAGNOSIS — Z71.85 VACCINE COUNSELING: ICD-10-CM

## 2021-12-28 DIAGNOSIS — H04.123 DRY EYE SYNDROME OF BILATERAL LACRIMAL GLANDS: ICD-10-CM

## 2021-12-28 PROCEDURE — 81003 URINALYSIS AUTO W/O SCOPE: CPT | Performed by: INTERNAL MEDICINE

## 2021-12-28 PROCEDURE — 3078F DIAST BP <80 MM HG: CPT | Performed by: INTERNAL MEDICINE

## 2021-12-28 PROCEDURE — 3008F BODY MASS INDEX DOCD: CPT | Performed by: INTERNAL MEDICINE

## 2021-12-28 PROCEDURE — 99214 OFFICE O/P EST MOD 30 MIN: CPT | Performed by: INTERNAL MEDICINE

## 2021-12-28 PROCEDURE — 3075F SYST BP GE 130 - 139MM HG: CPT | Performed by: INTERNAL MEDICINE

## 2021-12-28 RX ORDER — MAGNESIUM OXIDE 400 MG (241.3 MG MAGNESIUM) TABLET
800 TABLET DAILY
COMMUNITY

## 2021-12-28 NOTE — ASSESSMENT & PLAN NOTE
September 28, 2018. He status post cystoscopy, left retrograde pyelogram left diagnostic ureteroscopy and left stent placement September 13, 2018. He had presented to the hospital with right-sided flank pain secondary to a 2 mm obstructing stone.   CAREY foll

## 2021-12-28 NOTE — ASSESSMENT & PLAN NOTE
S/P laminectomy at L4-5 for right foot drop in October 2018. Uneventful recovery. His right foot became stronger than before surgery but never recovered 100%. SP strain 12-20. Improved.

## 2021-12-28 NOTE — PATIENT INSTRUCTIONS
ASSESSMENT AND OTHER RELEVANT CHRONIC CONDITIONS:   Lauren Che is a [de-identified]year old male who presents for a Medicare Assessment.      PLAN SUMMARY:   Diagnoses and all orders for this visit:    Stage 3b chronic kidney disease ([facility] [address] No motrin, ibuprofe Hyperkalemia Recheck. Abnormal computed tomography of ureter    Personal history of prostate cancer SP radical prostatectomy 2001. Borderline glaucoma, unspecified laterality Stable.  Sees eye MD.     Lumbar stenosis with neurogenic claudication Component Value Date    CHOLEST 118 05/28/2021    HDL 45 05/28/2021    LDL 50 05/28/2021    TRIG 115 05/28/2021         Electrocardiogram (EKG)   Covered if needed at Welcome to Medicare, and non-screening if indicated for medical reasons 11/30/2020 frequently.     Medicare covers every 3 months Lab Results   Component Value Date     (H) 12/20/2021    A1C 6.8 (H) 12/20/2021       No recommendations at this time    Creat/alb ratio Annually Lab Results   Component Value Date    MICROALBCREA 5.2 05

## 2021-12-28 NOTE — PROGRESS NOTES
HPI:    Patient ID: Kasey Lester is a [de-identified]year old male. Kasey Lester is a [de-identified]year old male who presents for a complete physical exam.   HPI:     Patient here for follow up of Diabetes. Has been taking medications regularly.     Checks sugars 1 MG Oral Tab Take by mouth 2 (two) times a day. • folic acid 763 MCG Oral Tab Take 800 mcg by mouth daily. Patient is taking it once a day.      • TRULICITY 7.78 XS/9.2RK Subcutaneous Solution Pen-injector      • METFORMIN HCL 1000 MG Oral Tab TAKE 1 TAB strips to check glucose 2 times daily.  Dx. E11.65 50 strip 6   • Glucose Blood (ONETOUCH ULTRA BLUE) In Vitro Strip USE ONE STRIP TO CHECK GLUCOSE TWICE DAILY 200 strip 3   • ONETOUCH LANCETS Does not apply Misc Testing twice a day   Diagnosis E11.65 100 e 1.00        Types: Cigarettes        Quit date: 1998        Years since quittin.0      Smokeless tobacco: Never Used    Vaping Use      Vaping Use: Never used    Alcohol use: Yes      Comment: rarely    Drug use: No     Exercise: three times per w indicates understanding of these issues and agrees to the plan. The patient is asked to return for annual physical in 1 year.      Wt Readings from Last 3 Encounters:  12/28/21 : 175 lb 9.6 oz (79.7 kg)  05/24/21 : 187 lb 12.8 oz (85.2 kg)  12/21/20 : 184 Negative. Negative for abdominal distention, abdominal pain, anal bleeding, blood in stool, constipation, diarrhea, nausea, rectal pain and vomiting. Endocrine: Negative for cold intolerance, heat intolerance, polydipsia, polyphagia and polyuria.    Luisa Ophthalmic Solution Place 1 drop into the left eye every 2 (two) hours while awake. Use after surgery as directed 1 Bottle 0   • prednisoLONE acetate (PRED FORTE) 1 % Ophthalmic Suspension Place 1 drop into the left eye every 2 (two) hours while awake.  Use reported) 30 tablet 0     Allergies:  Shellfish-Derived P*    UNKNOWN    HISTORY:  Past Medical History:   Diagnosis Date   • Cataracts, bilateral 12-7-15   • Footdrop     right   • History of blood transfusion     5 years ago   • Hypogammaglobulinemia (HC oz (85.2 kg)  12/21/20 : 184 lb (83.5 kg)      Physical Exam  Vitals and nursing note reviewed. Constitutional:       General: He is not in acute distress. Appearance: Normal appearance. He is well-developed and well-groomed.  He is not ill-appearing, exudate or hemorrhage. Left eye: Left conjunctiva is not injected. No chemosis, exudate or hemorrhage. Pupils: Pupils are equal, round, and reactive to light. Neck:      Thyroid: No thyroid mass, thyromegaly or thyroid tenderness.       Vascular: submandibular, preauricular, posterior auricular or occipital adenopathy. Cervical: No cervical adenopathy. Right cervical: No superficial, deep or posterior cervical adenopathy.      Left cervical: No superficial, deep or posterior cervical adeno Referrals:  None        HPI:   Carlos Barbour is a [de-identified]year old male who presents for a Medicare Subsequent Annual Wellness visit (Pt already had Initial Annual Wellness). He has been screened for Falls and is low risk.     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 E11.9. Checks qam.  Glucose Blood (ACCU-CHEK GUIDE) In Vitro Strip, Dx. E11.9.  Chec day    Diagnosis E11.65       MEDICAL INFORMATION:   He  has a past medical history of Cataracts, bilateral (12-7-15), Footdrop, History of blood transfusion, Hypogammaglobulinemia ([facility] [address] Inguinal hernia, right (2005), and Positive PPD (2003).     He  has a and need to ask people to repeat themselves: No   I especially have trouble understanding the speech of women and children: No I have trouble understanding the speaker in a large room such as at a meeting or place of Faith: No   Many people I talk to see ibuprofen, advil, alleve, naprosyn  with these medications. Mixed hyperlipidemia Stable. Essential hypertension Goo conrtrol. Careful with diet and excercise at least 30 minutes 3-4 times a week.  Check blood pressures at different times on differen hospital with right-sided flank pain secondary to a 2 mm obstructing stone. A follow-up ultrasound September 22, 2018 showed resolved right-sided hydronephrosis. He states he feels well. His stent was removed during his last visit September 28, 2018.   Amol Hoyt anemia On iron Rx. . Check blood in 3-22. 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 Encounter for annual health examination Check urine. RTC in 4 months.       Diet assessment: good     PLAN:  The patient indicates understanding of these issues and agrees to non-screening if indicated for medical reasons 11/30/2020      Ultrasound Screening for Abdominal Aortic Aneurysm (AAA) Covered once in a lifetime for one of the following risk factors   • Men who are 73-68 years old and have ever smoked   • Anyone with a Component Value Date    MICROALBCREA 5.2 05/24/2021       LDL Annually Lab Results   Component Value Date    LDL 50 05/28/2021       Dilated Eye Exam Annually 06/07/2021

## 2021-12-29 ENCOUNTER — LAB ENCOUNTER (OUTPATIENT)
Dept: LAB | Age: 81
End: 2021-12-29
Attending: NURSE PRACTITIONER
Payer: MEDICARE

## 2021-12-29 DIAGNOSIS — E83.52 HYPERCALCEMIA: ICD-10-CM

## 2021-12-29 DIAGNOSIS — E11.65 TYPE II DIABETES MELLITUS, UNCONTROLLED (HCC): Primary | ICD-10-CM

## 2021-12-29 DIAGNOSIS — E87.5 HYPERKALEMIA: ICD-10-CM

## 2021-12-29 LAB — PTH-INTACT SERPL-MCNC: 20.3 PG/ML (ref 18.5–88)

## 2021-12-29 PROCEDURE — 36415 COLL VENOUS BLD VENIPUNCTURE: CPT

## 2021-12-29 PROCEDURE — 82330 ASSAY OF CALCIUM: CPT

## 2021-12-29 PROCEDURE — 83970 ASSAY OF PARATHORMONE: CPT

## 2021-12-29 PROCEDURE — 82542 COL CHROMOTOGRAPHY QUAL/QUAN: CPT

## 2021-12-30 RX ORDER — LISINOPRIL 5 MG/1
5 TABLET ORAL DAILY
Qty: 90 TABLET | Refills: 0 | Status: SHIPPED | OUTPATIENT
Start: 2021-12-30 | End: 2022-03-30

## 2022-01-01 LAB
CALCIUM IONIZED PH 7.4: 1.29 MMOL/L
CALCIUM IONIZED, SERUM: 1.33 MMOL/L

## 2022-01-08 LAB — PTH RELATED PEPTIDE: 3.1 PMOL/L

## 2022-01-24 ENCOUNTER — HOSPITAL ENCOUNTER (OUTPATIENT)
Dept: CT IMAGING | Facility: HOSPITAL | Age: 82
Discharge: HOME | End: 2022-01-24
Attending: INTERNAL MEDICINE
Payer: MEDICARE

## 2022-01-24 DIAGNOSIS — E83.52 HYPERCALCEMIA: ICD-10-CM

## 2022-01-24 LAB — CREAT BLD-MCNC: 1.5 MG/DL

## 2022-01-24 PROCEDURE — 74177 CT ABD & PELVIS W/CONTRAST: CPT | Performed by: INTERNAL MEDICINE

## 2022-01-24 PROCEDURE — 82565 ASSAY OF CREATININE: CPT

## 2022-01-24 PROCEDURE — 71260 CT THORAX DX C+: CPT | Performed by: INTERNAL MEDICINE

## 2022-01-25 PROBLEM — R93.5 ABNORMAL CT OF THE ABDOMEN: Status: ACTIVE | Noted: 2022-01-25

## 2022-02-15 ENCOUNTER — TELEPHONE (OUTPATIENT)
Dept: INTERNAL MEDICINE CLINIC | Facility: CLINIC | Age: 82
End: 2022-02-15

## 2022-02-15 ENCOUNTER — TELEPHONE (OUTPATIENT)
Dept: HEMATOLOGY/ONCOLOGY | Facility: HOSPITAL | Age: 82
End: 2022-02-15

## 2022-02-15 NOTE — TELEPHONE ENCOUNTER
Rebecca Doan called he had seen Dr Daniel Cabrera in the past briefly( 5 yrs ago) and there was no further need to see her. He recently had labs of concern(Calcium abnormality), scans and was told he may need to see a Oncologist.  He is having his labs and scan results from Dr Reuben Coburn office(Dr Reuben Coburn is referring patient to an oncologist due to high calcium levels) and requests if Dr Daniel Cabrera could review to determine if he indeed needs to see her.

## 2022-02-15 NOTE — TELEPHONE ENCOUNTER
Patient's endocrinologist, Dr. mAada Zee, is referring patient to an oncologist due to high calcium levels. Patient is having Dr. Lucrecia Vail office fax a copy of the test recently completed. Patient is requesting Dr. Ashlie Bianchi opinion.

## 2022-02-16 ENCOUNTER — TELEPHONE (OUTPATIENT)
Dept: CASE MANAGEMENT | Age: 82
End: 2022-02-16

## 2022-02-16 NOTE — TELEPHONE ENCOUNTER
Dr. Laquita Berkowitz,    The patient called requesting referral to Dr. Marjan Sierra. No DX provided. Appointment 2/25/22. Pended referral please review diagnosis and sign off if you agree. Thank you.   Franklin Helm

## 2022-02-17 NOTE — TELEPHONE ENCOUNTER
We received results. They are in 9You.   Calcium Ionized, Serum   (Value 1.33)  Calcium Ionized pH 7.4     (Value 1.29)  Pth intact (Value 20.3)

## 2022-02-17 NOTE — TELEPHONE ENCOUNTER
Per Patient he sees Dr Jaden Lee (Endocrinologist),he did some blood work and his Calcium was high, because of that Dr Jaden Lee wants him to see Dr Stella Smith. Patient states Dr Jaden Lee will be sending results to us. Per patient if Dr Vero Garcia feels is not need it is ok with him.

## 2022-02-25 ENCOUNTER — OFFICE VISIT (OUTPATIENT)
Dept: HEMATOLOGY/ONCOLOGY | Facility: HOSPITAL | Age: 82
End: 2022-02-25
Attending: INTERNAL MEDICINE
Payer: MEDICARE

## 2022-02-25 VITALS
RESPIRATION RATE: 16 BRPM | BODY MASS INDEX: 26.52 KG/M2 | OXYGEN SATURATION: 97 % | DIASTOLIC BLOOD PRESSURE: 68 MMHG | HEART RATE: 92 BPM | SYSTOLIC BLOOD PRESSURE: 114 MMHG | TEMPERATURE: 99 F | WEIGHT: 175 LBS | HEIGHT: 68 IN

## 2022-02-25 DIAGNOSIS — Z85.46 PERSONAL HISTORY OF PROSTATE CANCER: ICD-10-CM

## 2022-02-25 DIAGNOSIS — R93.5 ABNORMAL CT OF THE ABDOMEN: ICD-10-CM

## 2022-02-25 DIAGNOSIS — E83.52 HYPERCALCEMIA: Primary | ICD-10-CM

## 2022-02-25 DIAGNOSIS — D50.8 OTHER IRON DEFICIENCY ANEMIA: ICD-10-CM

## 2022-02-25 DIAGNOSIS — R91.1 INCIDENTAL LUNG NODULE, > 3MM AND < 8MM: ICD-10-CM

## 2022-02-25 PROCEDURE — 99204 OFFICE O/P NEW MOD 45 MIN: CPT | Performed by: INTERNAL MEDICINE

## 2022-03-01 ENCOUNTER — TELEPHONE (OUTPATIENT)
Dept: INTERNAL MEDICINE CLINIC | Facility: CLINIC | Age: 82
End: 2022-03-01

## 2022-03-01 PROBLEM — R91.1 PULMONARY NODULE: Status: ACTIVE | Noted: 2022-03-01

## 2022-03-01 NOTE — TELEPHONE ENCOUNTER
Patient states he received his first shingles dose yesterday 2/28 at [facility] [facility] in [place].

## 2022-03-04 ENCOUNTER — LAB ENCOUNTER (OUTPATIENT)
Dept: LAB | Facility: HOSPITAL | Age: 82
End: 2022-03-04
Attending: INTERNAL MEDICINE
Payer: MEDICARE

## 2022-03-04 ENCOUNTER — LAB ENCOUNTER (OUTPATIENT)
Dept: LAB | Age: 82
End: 2022-03-04
Attending: INTERNAL MEDICINE
Payer: MEDICARE

## 2022-03-04 DIAGNOSIS — E83.42 HYPOMAGNESEMIA: ICD-10-CM

## 2022-03-04 DIAGNOSIS — R79.89 ABNORMAL TSH: ICD-10-CM

## 2022-03-04 DIAGNOSIS — E83.52 HYPERCALCEMIA: ICD-10-CM

## 2022-03-04 DIAGNOSIS — E11.9 TYPE 2 DIABETES MELLITUS WITHOUT COMPLICATION, WITHOUT LONG-TERM CURRENT USE OF INSULIN (HCC): ICD-10-CM

## 2022-03-04 DIAGNOSIS — IMO0002 TYPE II DIABETES MELLITUS, UNCONTROLLED: ICD-10-CM

## 2022-03-04 DIAGNOSIS — Z85.46 PERSONAL HISTORY OF PROSTATE CANCER: ICD-10-CM

## 2022-03-04 DIAGNOSIS — E87.5 HYPERKALEMIA: ICD-10-CM

## 2022-03-04 DIAGNOSIS — E83.52 HYPERCALCEMIA: Primary | ICD-10-CM

## 2022-03-04 LAB
ALBUMIN SERPL-MCNC: 3.8 G/DL (ref 3.4–5)
ALBUMIN/GLOB SERPL: 1.4 {RATIO} (ref 1–2)
ALP LIVER SERPL-CCNC: 104 U/L
ALT SERPL-CCNC: 28 U/L
ANION GAP SERPL CALC-SCNC: 5 MMOL/L (ref 0–18)
AST SERPL-CCNC: 22 U/L (ref 15–37)
BILIRUB SERPL-MCNC: 0.4 MG/DL (ref 0.1–2)
BUN BLD-MCNC: 24 MG/DL (ref 7–18)
BUN/CREAT SERPL: 17 (ref 10–20)
CALCIUM BLD-MCNC: 9.2 MG/DL (ref 8.5–10.1)
CHLORIDE SERPL-SCNC: 107 MMOL/L (ref 98–112)
CO2 SERPL-SCNC: 26 MMOL/L (ref 21–32)
CREAT BLD-MCNC: 1.41 MG/DL
EST. AVERAGE GLUCOSE BLD GHB EST-MCNC: 163 MG/DL (ref 68–126)
FASTING STATUS PATIENT QL REPORTED: YES
GLOBULIN PLAS-MCNC: 2.8 G/DL (ref 2.8–4.4)
GLUCOSE BLD-MCNC: 125 MG/DL (ref 70–99)
HBA1C MFR BLD: 7.3 % (ref ?–5.7)
MAGNESIUM SERPL-MCNC: 2.1 MG/DL (ref 1.6–2.6)
OSMOLALITY SERPL CALC.SUM OF ELEC: 292 MOSM/KG (ref 275–295)
POTASSIUM SERPL-SCNC: 4.8 MMOL/L (ref 3.5–5.1)
PROT SERPL-MCNC: 6.6 G/DL (ref 6.4–8.2)
PSA SERPL-MCNC: <0.01 NG/ML (ref ?–4)
SODIUM SERPL-SCNC: 138 MMOL/L (ref 136–145)
T4 FREE SERPL-MCNC: 0.9 NG/DL (ref 0.8–1.7)
TSI SER-ACNC: 5.66 MIU/ML (ref 0.36–3.74)
VIT D+METAB SERPL-MCNC: 53.5 NG/ML (ref 30–100)

## 2022-03-04 PROCEDURE — 83970 ASSAY OF PARATHORMONE: CPT | Performed by: INTERNAL MEDICINE

## 2022-03-04 PROCEDURE — 84153 ASSAY OF PSA TOTAL: CPT

## 2022-03-04 PROCEDURE — 82306 VITAMIN D 25 HYDROXY: CPT

## 2022-03-04 PROCEDURE — 82340 ASSAY OF CALCIUM IN URINE: CPT

## 2022-03-04 PROCEDURE — 84443 ASSAY THYROID STIM HORMONE: CPT

## 2022-03-04 PROCEDURE — 80053 COMPREHEN METABOLIC PANEL: CPT

## 2022-03-04 PROCEDURE — 84439 ASSAY OF FREE THYROXINE: CPT

## 2022-03-04 PROCEDURE — 36415 COLL VENOUS BLD VENIPUNCTURE: CPT

## 2022-03-04 PROCEDURE — 86376 MICROSOMAL ANTIBODY EACH: CPT | Performed by: INTERNAL MEDICINE

## 2022-03-04 PROCEDURE — 83735 ASSAY OF MAGNESIUM: CPT

## 2022-03-04 PROCEDURE — 83036 HEMOGLOBIN GLYCOSYLATED A1C: CPT

## 2022-03-04 PROCEDURE — 82330 ASSAY OF CALCIUM: CPT

## 2022-03-04 PROCEDURE — 82542 COL CHROMOTOGRAPHY QUAL/QUAN: CPT | Performed by: INTERNAL MEDICINE

## 2022-03-04 NOTE — PROGRESS NOTES
CMP Normal (electrolytes, and liver functions), declining kidney function is stable but declined. No motrin, ibuprofen, advil, alleve, naprosyn  with these medications. Magnesium level is normal.  Prostate cancer screening PSA looks okay. Thyroid is slightly abnormal.  Seems to be increasing. Awaiting some more blood. Would recheck in another 3 months. Make sure no biotin and biotin-containing products at least 2 weeks prior to rechecking her thyroid levels. Orders written for future labs in 3 months. Vitamin D level is okay. Hemoglobin A1c which is a 3-month average of sugars is worse than prior. Goal is 6.5 or less. Seems to be climbing. Would recommend seeing endocrinology. Careful with diet and excercise at least 30 minutes 3-4 times a week. Check sugars at different times on different dates. Careful with low sugars. Carry something with you and check sugar if can. Can carry ye cracker, etc. Decrease carbohydrates. But also, careful with fruits and natural sugars. One serving a day and no more than 1 handful every day. Check feet  every AM and careful with sores and ulcers on feet bilaterally. Check eyes every year with dilated eye exam.  Check sugars. 2-hour postmeal should be less than 140s. Pre-meal should be 's. Both equally affected A1c. Recheck A1c in 3 months.

## 2022-03-05 LAB
CALCIUM 24H UR-SRATE: 176 MG/24HR (ref 42–353)
SPECIMEN VOL UR: 650 ML

## 2022-03-07 LAB
CALCIUM IONIZED PH 7.4: 1.25 MMOL/L
CALCIUM IONIZED, SERUM: 1.28 MMOL/L

## 2022-03-15 ENCOUNTER — LAB ENCOUNTER (OUTPATIENT)
Dept: LAB | Facility: HOSPITAL | Age: 82
End: 2022-03-15
Attending: NURSE PRACTITIONER
Payer: MEDICARE

## 2022-03-15 DIAGNOSIS — R79.89 HIGH THYROID HORMONE LEVEL: Primary | ICD-10-CM

## 2022-03-15 LAB
T3FREE SERPL-MCNC: 2.37 PG/ML (ref 2.4–4.2)
T4 FREE SERPL-MCNC: 0.9 NG/DL (ref 0.8–1.7)
TSI SER-ACNC: 4.37 MIU/ML (ref 0.36–3.74)

## 2022-03-15 PROCEDURE — 36415 COLL VENOUS BLD VENIPUNCTURE: CPT

## 2022-03-15 PROCEDURE — 84443 ASSAY THYROID STIM HORMONE: CPT

## 2022-03-15 PROCEDURE — 84481 FREE ASSAY (FT-3): CPT

## 2022-03-15 PROCEDURE — 84439 ASSAY OF FREE THYROXINE: CPT

## 2022-03-17 RX ORDER — ATORVASTATIN CALCIUM 40 MG/1
40 TABLET, FILM COATED ORAL DAILY
Qty: 90 TABLET | Refills: 1 | Status: SHIPPED | OUTPATIENT
Start: 2022-03-17 | End: 2022-08-03

## 2022-03-17 NOTE — TELEPHONE ENCOUNTER
Please review; protocol failed/no protocol    Requested Prescriptions   Pending Prescriptions Disp Refills    METFORMIN HCL 1000 MG Oral Tab [Pharmacy Med Name: METFORMIN HYDROCHLORIDE 1000 MG Tablet] 180 tablet 1     Sig: TAKE 1 TABLET TWICE DAILY        Diabetes Medication Protocol Failed - 3/17/2022  5:59 PM        Failed - GFR Non- > 50     Lab Results   Component Value Date    GFRNAA 47 (L) 03/04/2022                 Passed - Last A1C < 7.5 and within past 6 months     Lab Results   Component Value Date    A1C 7.3 (H) 03/04/2022               Passed - Appointment in past 6 or next 3 months        Passed - GFR in the past 12 months          Signed Prescriptions Disp Refills    atorvastatin 40 MG Oral Tab 90 tablet 1     Sig: Take 1 tablet (40 mg total) by mouth daily.         Cholesterol Medication Protocol Passed - 3/17/2022  5:59 PM        Passed - ALT in past 12 months        Passed - LDL in past 12 months        Passed - Last ALT < 80       Lab Results   Component Value Date    ALT 28 03/04/2022             Passed - Last LDL < 130     Lab Results   Component Value Date    LDL 50 05/28/2021               Passed - Appointment in past 12 or next 3 months               Recent Outpatient Visits              2 weeks ago Hypercalcemia    [facility] Hematology Oncology Anibal Tracy MD    Office Visit    2 months ago Stage 3b chronic kidney disease [facility])    [facility], [facility], [address], Elmer Villa MD    Office Visit    9 months ago Type 2 diabetes mellitus without complication, without long-term current use of insulin [facility])    Ophthalmology - Leonard Benitez MD    Office Visit    9 months ago Abnormal computed tomography of ureter    [facility], [facility], [address], Elmer Villa MD    Office Visit    10 months ago Acute lateral meniscus tear of right knee, initial encounter    [facility], [address] Eduarda Ivan MD    Office Visit             Future Appointments         Provider Department Appt Notes    In 2 months Michelle Delgado MD [facility], [facility], [address] Please schedule MA visit    In 2 months Krishna Truong MD Ophthalmology - Livia Zendejas Return in about 1 year (around 6/7/2022) for complete exam.

## 2022-03-17 NOTE — TELEPHONE ENCOUNTER
Refill passed per [facility] protocol.      Requested Prescriptions   Pending Prescriptions Disp Refills    METFORMIN HCL 1000 MG Oral Tab [Pharmacy Med Name: METFORMIN HYDROCHLORIDE 1000 MG Tablet] 180 tablet 1     Sig: TAKE 1 TABLET TWICE DAILY        Diabetes Medication Protocol Failed - 3/17/2022  5:59 PM        Failed - GFR Non- > 50     Lab Results   Component Value Date    GFRNAA 47 (L) 03/04/2022                 Passed - Last A1C < 7.5 and within past 6 months     Lab Results   Component Value Date    A1C 7.3 (H) 03/04/2022               Passed - Appointment in past 6 or next 3 months        Passed - GFR in the past 12 months           ATORVASTATIN 40 MG Oral Tab [Pharmacy Med Name: ATORVASTATIN CALCIUM 40 MG Tablet] 90 tablet 1     Sig: TAKE 1 TABLET EVERY DAY        Cholesterol Medication Protocol Passed - 3/17/2022  5:59 PM        Passed - ALT in past 12 months        Passed - LDL in past 12 months        Passed - Last ALT < 80       Lab Results   Component Value Date    ALT 28 03/04/2022             Passed - Last LDL < 130     Lab Results   Component Value Date    LDL 50 05/28/2021               Passed - Appointment in past 12 or next 3 months                Recent Outpatient Visits              2 weeks ago Hypercalcemia    [facility] Hematology Oncology Rosette Sever, MD    Office Visit    2 months ago Stage 3b chronic kidney disease [facility])    [place], [place], Baljinder Avelar MD    Office Visit    9 months ago Type 2 diabetes mellitus without complication, without long-term current use of insulin [facility])    Ophthalmology - Gaviota Silva MD    Office Visit    9 months ago Abnormal computed tomography of ureter    [place], [place], Baljinder Avelar MD    Office Visit    10 months ago Acute lateral meniscus tear of right knee, initial encounter    [facility], [address] Damion Villafana MD    Office Visit             Future Appointments         Provider Department Appt Notes    In 2 months Izabel Sidhu., MD [facility], [facility], [address] Please schedule MA visit    In 2 months Kiara London MD Ophthalmology - Maynor Conley Return in about 1 year (around 6/7/2022) for complete exam.

## 2022-03-30 RX ORDER — LISINOPRIL 5 MG/1
5 TABLET ORAL DAILY
Qty: 90 TABLET | Refills: 1 | Status: SHIPPED | OUTPATIENT
Start: 2022-03-30 | End: 2022-08-18

## 2022-03-31 NOTE — TELEPHONE ENCOUNTER
Please review. Protocol Failed or has no Protocol.     Requested Prescriptions   Pending Prescriptions Disp Refills    LISINOPRIL 5 MG Oral Tab [Pharmacy Med Name: LISINOPRIL 5 MG Tablet] 90 tablet 0     Sig: TAKE 1 TABLET EVERY DAY        Hypertensive Medications Protocol Failed - 3/30/2022  7:05 PM        Failed - GFR Non- > 50     Lab Results   Component Value Date    GFRNAA 47 (L) 03/04/2022                 Passed - CMP or BMP in past 12 months        Passed - Appointment in past 6 or next 3 months             Recent Outpatient Visits              1 month ago Hypercalcemia    [facility] Hematology Oncology Latasha Riojas MD    Office Visit    3 months ago Stage 3b chronic kidney disease [facility])    [employer] [facility], [address], Amada Kingston MD    Office Visit    9 months ago Type 2 diabetes mellitus without complication, without long-term current use of insulin [facility])    Ophthalmology - [facility], Abdi Hsu MD    Office Visit    10 months ago Abnormal computed tomography of ureter    [place] [employer] [facility], [address], Amada Kingston MD    Office Visit    10 months ago Acute lateral meniscus tear of right knee, initial encounter    [facility] [address], Katerina Garcia MD    Office Visit          Future Appointments         Provider Department Appt Notes    In 2 months Hodan Tobar MD [place] [employer] [facility], [address] Please schedule MA visit    In 2 months Keerthi Cuevas MD [facility] [place] Return in about 1 year (around 6/7/2022) for complete exam.

## 2022-04-19 ENCOUNTER — TELEPHONE (OUTPATIENT)
Dept: INTERNAL MEDICINE CLINIC | Facility: CLINIC | Age: 82
End: 2022-04-19

## 2022-04-23 RX ORDER — LEVOTHYROXINE SODIUM 0.03 MG/1
25 TABLET ORAL
COMMUNITY
Start: 2022-03-18

## 2022-04-23 NOTE — TELEPHONE ENCOUNTER
Patient states he's unsure and that he'd discuss further with Dr. Flaca Byrne on office visit of 5/31.
Problem is oral agents he was on was not really helping with his sugars. His A1c was elevated even on the glimepiride. We can try some of the other newer agents but cost becomes an issue they do have coupons but the coupons are only for 30-day free trial and we cannot use coupons with his insurance. So work on a running into the problem. I can talk to Dr. Bertin Eagle if he wants I believe they have some coupons that may be covered better with his insurance?
Pt calling stating that he has been on Trulicity but it has become too expensive for him and he is wanting to know if Dr can put him back on Metformin. Pt also mentioned he thinks Trulicity is making him tired. Please advise.
Spoke to patient and advised him of 's note below. Patient states he was already seeing , endocrinologist, but does not want to follow up with her because she will have him take Trulicity. Patient states Trulicity costs $391 and patient states he feels tired and has no appetite when he takes this medication. Patient asking if he can go back to taking Glimepride 1mg twice a day. He states he was doing well, feeling great when he took Glimepiride. Patient states he wants to know what to do between May 6 and May 31 until he sees  May 31st since he will not be taking Trulicity.
Spoke to patient and he feels Trulicty is causing him to feel more tired. Patient Larissa Smith and the Internet says the medication can cause Thyroid cancer. He doesn't want to take Trulicity anymore. Advised him regarding the note below that he want's to go back on Metformin that he is already taking Metformin. He has many many medication questions. Advised office visit to discuss. Patient is scheduled for MA physical for May 31st.  He has 3 shots left of the Tulicity which will take him through the 6th of May. Please advise is it ok to be off the Trulicity from May 6 until his physical may 31st? Advised him to write his concerns regarding his meds down and bring them to his visit.
There is a risk of thyroid cancer in people who have a tendency (ie MEN syndrome family history) But metformin was not working for him, so going back on it his sugar was high nonetheless. If he does not want to do Trulicity then I would have him see endocrine diabetic teaching. A1c should be less than 6.5 to prevent long-term complications with the diabetes which are more concerning for him. His thyroid levels being off are not related to the Trulicity. That is reflective of the function of the thyroid which can be from B12 in different vitamin sometimes that can interfere with the blood work being done. Thyroid function is not related to thyroid cancer.
18-Nov-2019 14:50

## 2022-05-06 ENCOUNTER — TELEPHONE (OUTPATIENT)
Dept: INTERNAL MEDICINE CLINIC | Facility: CLINIC | Age: 82
End: 2022-05-06

## 2022-05-06 NOTE — TELEPHONE ENCOUNTER
Put in new orders for lipid and CMP and microalbumin and. There is also an order from March 2022 for A1c and thyroid. Which he will also need.

## 2022-05-06 NOTE — TELEPHONE ENCOUNTER
Patient has been informed of Dr Khushboo Herbert message bellow and to please let them know there is fasting orders from today and March.

## 2022-05-06 NOTE — TELEPHONE ENCOUNTER
Patient called to see if he is due for labs prior to his physical appointment. He would like to get them done if Dr. Hassan Seip can place the orders. Patient would like a call if he is due.

## 2022-05-13 ENCOUNTER — LAB ENCOUNTER (OUTPATIENT)
Dept: LAB | Age: 82
End: 2022-05-13
Attending: INTERNAL MEDICINE
Payer: MEDICARE

## 2022-05-13 DIAGNOSIS — E11.9 TYPE 2 DIABETES MELLITUS WITHOUT COMPLICATION, WITHOUT LONG-TERM CURRENT USE OF INSULIN (HCC): ICD-10-CM

## 2022-05-13 DIAGNOSIS — R79.89 ABNORMAL TSH: ICD-10-CM

## 2022-05-13 LAB
ALBUMIN SERPL-MCNC: 3.8 G/DL (ref 3.4–5)
ALBUMIN/GLOB SERPL: 1.2 {RATIO} (ref 1–2)
ALP LIVER SERPL-CCNC: 80 U/L
ALT SERPL-CCNC: 31 U/L
ANION GAP SERPL CALC-SCNC: 4 MMOL/L (ref 0–18)
AST SERPL-CCNC: 25 U/L (ref 15–37)
BILIRUB SERPL-MCNC: 0.5 MG/DL (ref 0.1–2)
BUN BLD-MCNC: 21 MG/DL (ref 7–18)
BUN/CREAT SERPL: 13 (ref 10–20)
CALCIUM BLD-MCNC: 9.2 MG/DL (ref 8.5–10.1)
CHLORIDE SERPL-SCNC: 106 MMOL/L (ref 98–112)
CHOLEST SERPL-MCNC: 121 MG/DL (ref ?–200)
CO2 SERPL-SCNC: 28 MMOL/L (ref 21–32)
CREAT BLD-MCNC: 1.61 MG/DL
CREAT UR-SCNC: 224 MG/DL
EST. AVERAGE GLUCOSE BLD GHB EST-MCNC: 157 MG/DL (ref 68–126)
FASTING PATIENT LIPID ANSWER: YES
FASTING STATUS PATIENT QL REPORTED: YES
GLOBULIN PLAS-MCNC: 3.2 G/DL (ref 2.8–4.4)
GLUCOSE BLD-MCNC: 117 MG/DL (ref 70–99)
HBA1C MFR BLD: 7.1 % (ref ?–5.7)
HDLC SERPL-MCNC: 39 MG/DL (ref 40–59)
LDLC SERPL CALC-MCNC: 52 MG/DL (ref ?–100)
MICROALBUMIN UR-MCNC: 1.09 MG/DL
MICROALBUMIN/CREAT 24H UR-RTO: 4.9 UG/MG (ref ?–30)
NONHDLC SERPL-MCNC: 82 MG/DL (ref ?–130)
OSMOLALITY SERPL CALC.SUM OF ELEC: 290 MOSM/KG (ref 275–295)
POTASSIUM SERPL-SCNC: 4.6 MMOL/L (ref 3.5–5.1)
PROT SERPL-MCNC: 7 G/DL (ref 6.4–8.2)
SODIUM SERPL-SCNC: 138 MMOL/L (ref 136–145)
T4 FREE SERPL-MCNC: 0.9 NG/DL (ref 0.8–1.7)
TRIGL SERPL-MCNC: 181 MG/DL (ref 30–149)
TSI SER-ACNC: 4.68 MIU/ML (ref 0.36–3.74)
VLDLC SERPL CALC-MCNC: 26 MG/DL (ref 0–30)

## 2022-05-13 PROCEDURE — 83036 HEMOGLOBIN GLYCOSYLATED A1C: CPT

## 2022-05-13 PROCEDURE — 82570 ASSAY OF URINE CREATININE: CPT

## 2022-05-13 PROCEDURE — 36415 COLL VENOUS BLD VENIPUNCTURE: CPT

## 2022-05-13 PROCEDURE — 80053 COMPREHEN METABOLIC PANEL: CPT

## 2022-05-13 PROCEDURE — 82043 UR ALBUMIN QUANTITATIVE: CPT

## 2022-05-13 PROCEDURE — 80061 LIPID PANEL: CPT

## 2022-05-13 PROCEDURE — 84439 ASSAY OF FREE THYROXINE: CPT

## 2022-05-13 PROCEDURE — 84443 ASSAY THYROID STIM HORMONE: CPT

## 2022-05-18 ENCOUNTER — TELEPHONE (OUTPATIENT)
Dept: INTERNAL MEDICINE CLINIC | Facility: CLINIC | Age: 82
End: 2022-05-18

## 2022-05-18 NOTE — TELEPHONE ENCOUNTER
Spoke with patient ( verified)--reports he and spouse received second Shingles vaccine at [address] [place], [place] [address] today--requests record to be updated.       Verified via [facility]--immunization record updated to reflect

## 2022-05-31 ENCOUNTER — OFFICE VISIT (OUTPATIENT)
Dept: INTERNAL MEDICINE CLINIC | Facility: CLINIC | Age: 82
End: 2022-05-31
Payer: MEDICARE

## 2022-05-31 ENCOUNTER — TELEPHONE (OUTPATIENT)
Dept: INTERNAL MEDICINE CLINIC | Facility: CLINIC | Age: 82
End: 2022-05-31

## 2022-05-31 VITALS
WEIGHT: 178.81 LBS | RESPIRATION RATE: 17 BRPM | BODY MASS INDEX: 27.1 KG/M2 | TEMPERATURE: 97 F | OXYGEN SATURATION: 98 % | HEART RATE: 93 BPM | HEIGHT: 68 IN | SYSTOLIC BLOOD PRESSURE: 111 MMHG | DIASTOLIC BLOOD PRESSURE: 77 MMHG

## 2022-05-31 DIAGNOSIS — N18.32 STAGE 3B CHRONIC KIDNEY DISEASE (HCC): Chronic | ICD-10-CM

## 2022-05-31 DIAGNOSIS — E11.9 TYPE 2 DIABETES MELLITUS WITHOUT COMPLICATION, WITHOUT LONG-TERM CURRENT USE OF INSULIN (HCC): ICD-10-CM

## 2022-05-31 DIAGNOSIS — R79.89 ABNORMAL TSH: Primary | ICD-10-CM

## 2022-05-31 DIAGNOSIS — E83.52 HYPERCALCEMIA: ICD-10-CM

## 2022-05-31 DIAGNOSIS — R91.1 PULMONARY NODULE: ICD-10-CM

## 2022-05-31 DIAGNOSIS — E78.2 MIXED HYPERLIPIDEMIA: ICD-10-CM

## 2022-05-31 DIAGNOSIS — Z71.85 VACCINE COUNSELING: ICD-10-CM

## 2022-05-31 DIAGNOSIS — D50.8 OTHER IRON DEFICIENCY ANEMIA: ICD-10-CM

## 2022-05-31 DIAGNOSIS — E87.5 HYPERKALEMIA: ICD-10-CM

## 2022-05-31 PROCEDURE — 3008F BODY MASS INDEX DOCD: CPT | Performed by: INTERNAL MEDICINE

## 2022-05-31 PROCEDURE — 99214 OFFICE O/P EST MOD 30 MIN: CPT | Performed by: INTERNAL MEDICINE

## 2022-05-31 PROCEDURE — 3074F SYST BP LT 130 MM HG: CPT | Performed by: INTERNAL MEDICINE

## 2022-05-31 PROCEDURE — 3078F DIAST BP <80 MM HG: CPT | Performed by: INTERNAL MEDICINE

## 2022-05-31 RX ORDER — LEVOTHYROXINE SODIUM 0.03 MG/1
TABLET ORAL
COMMUNITY
Start: 2022-05-25 | End: 2022-05-31 | Stop reason: DRUGHIGH

## 2022-05-31 RX ORDER — LEVOTHYROXINE SODIUM 0.05 MG/1
50 TABLET ORAL
Qty: 90 TABLET | Refills: 1 | Status: SHIPPED | OUTPATIENT
Start: 2022-05-31

## 2022-05-31 NOTE — TELEPHONE ENCOUNTER
Patient would like to inform Dr. Ana Maria Jimenez that the levothyroxine 50 mg was accepted and filled by [facility]. She does not have to put an order in through [facility].

## 2022-05-31 NOTE — ASSESSMENT & PLAN NOTE
1/24/2022 as part of the work-up to look for occult malignancy, the patient underwent CT scan of the chest, abdomen and pelvis which revealed no evidence of a primary cancer nor metastatic disease in the chest, abdomen, or pelvis. The postoperative changes secondary to prostatectomy. He also had a 2.7 cm wedge-shaped area of low attenuation in the medial spleen which was felt to be new since prior exam and may represent small splenic infarct. 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 Patient had hypercoagulable work-up at that time for acquired thrombophilia, given his age at the time it was not felt that a work-up for hereditary thrombophilia be performed, and he did not have family history either. Anticardiolipin antibodies were performed at the time which were negative.

## 2022-06-21 ENCOUNTER — TELEPHONE (OUTPATIENT)
Dept: INTERNAL MEDICINE CLINIC | Facility: CLINIC | Age: 82
End: 2022-06-21

## 2022-06-21 DIAGNOSIS — D22.9 MULTIPLE ATYPICAL NEVI: Primary | ICD-10-CM

## 2022-06-22 ENCOUNTER — TELEPHONE (OUTPATIENT)
Dept: INTERNAL MEDICINE CLINIC | Facility: CLINIC | Age: 82
End: 2022-06-22

## 2022-06-24 LAB
AMB EXT CHOLESTEROL, TOTAL: 121 MG/DL
AMB EXT HDL CHOLESTEROL: 39 MG/DL
AMB EXT HEMATOCRIT: 40.5
AMB EXT HEMOGLOBIN: 13.3
AMB EXT LDL CHOLESTEROL, DIRECT: 57 MG/DL
AMB EXT MCV: 94.8
AMB EXT PLATELETS: 268
AMB EXT TRIGLYCERIDES: 167 MG/DL
AMB EXT WBC: 4.9 X10(3)UL

## 2022-06-28 ENCOUNTER — TELEPHONE (OUTPATIENT)
Dept: INTERNAL MEDICINE CLINIC | Facility: CLINIC | Age: 82
End: 2022-06-28

## 2022-06-28 DIAGNOSIS — D50.8 OTHER IRON DEFICIENCY ANEMIA: Primary | ICD-10-CM

## 2022-06-28 NOTE — TELEPHONE ENCOUNTER
Received labs back. Total cholesterol 121 which is good  Less than 200. Triglycerides are 167. Slightly elevated 1 and less than 150. Lower carbs helps that. HDL is 39 we want that above 50. And LDL is 57 we want that less than 70. So that is good. Magnesium level is normal at 1.9. Thyroid is normal at 2.47. White blood cell count and cell counts are normal.  However iron level is low again iron storage is at 17 and normal for. We will resume iron if possible 325 once a day with folic acid a milligram once a day over-the-counter with vitamin C 500 once a day to help absorb the iron and recheck iron levels in 3 months. Orders written for future labs in 3 months. I am concerned why he is becoming anemic again.   Will recommend following up with GI.

## 2022-06-28 NOTE — TELEPHONE ENCOUNTER
Spoke with patient ( verified) and relayed Dr. Sandoval Gram message below--patient verbalizes understanding and agreement. No further questions/concerns at this time.     _______________________________________________________  Referred to Provider Information:  Provider Address Phone   True De La Cruz MD 2 [address] KAYLA BURT  [facility] [address]

## 2022-07-07 ENCOUNTER — APPOINTMENT (OUTPATIENT)
Dept: URBAN - METROPOLITAN AREA CLINIC 244 | Age: 82
Setting detail: DERMATOLOGY
End: 2022-07-07

## 2022-07-07 ENCOUNTER — LAB REQUISITION (OUTPATIENT)
Dept: LAB | Facility: HOSPITAL | Age: 82
End: 2022-07-07
Payer: MEDICARE

## 2022-07-07 DIAGNOSIS — L82.1 OTHER SEBORRHEIC KERATOSIS: ICD-10-CM

## 2022-07-07 DIAGNOSIS — R21 RASH AND OTHER NONSPECIFIC SKIN ERUPTION: ICD-10-CM

## 2022-07-07 DIAGNOSIS — L81.4 OTHER MELANIN HYPERPIGMENTATION: ICD-10-CM

## 2022-07-07 DIAGNOSIS — D22 MELANOCYTIC NEVI: ICD-10-CM

## 2022-07-07 DIAGNOSIS — L57.0 ACTINIC KERATOSIS: ICD-10-CM

## 2022-07-07 PROBLEM — D22.5 MELANOCYTIC NEVI OF TRUNK: Status: ACTIVE | Noted: 2022-07-07

## 2022-07-07 PROCEDURE — OTHER BIOPSY BY PUNCH METHOD: OTHER

## 2022-07-07 PROCEDURE — 99213 OFFICE O/P EST LOW 20 MIN: CPT | Mod: 25

## 2022-07-07 PROCEDURE — 88305 TISSUE EXAM BY PATHOLOGIST: CPT | Performed by: DERMATOLOGY

## 2022-07-07 PROCEDURE — OTHER DEFER: OTHER

## 2022-07-07 PROCEDURE — 11104 PUNCH BX SKIN SINGLE LESION: CPT

## 2022-07-07 PROCEDURE — OTHER COUNSELING: OTHER

## 2022-07-07 ASSESSMENT — LOCATION SIMPLE DESCRIPTION DERM
LOCATION SIMPLE: ABDOMEN
LOCATION SIMPLE: INFERIOR FOREHEAD
LOCATION SIMPLE: LEFT UPPER BACK
LOCATION SIMPLE: CHEST
LOCATION SIMPLE: RIGHT UPPER BACK

## 2022-07-07 ASSESSMENT — LOCATION ZONE DERM
LOCATION ZONE: FACE
LOCATION ZONE: TRUNK

## 2022-07-07 ASSESSMENT — LOCATION DETAILED DESCRIPTION DERM
LOCATION DETAILED: LEFT LATERAL ABDOMEN
LOCATION DETAILED: LEFT MEDIAL UPPER BACK
LOCATION DETAILED: UPPER STERNUM
LOCATION DETAILED: RIGHT SUPERIOR MEDIAL UPPER BACK
LOCATION DETAILED: INFERIOR MID FOREHEAD

## 2022-07-07 NOTE — PROCEDURE: DEFER
Detail Level: Detailed
Introduction Text (Please End With A Colon): The following treatment was deferred
Procedure To Be Performed At Next Visit: Liquid nitrogen

## 2022-07-07 NOTE — PROCEDURE: BIOPSY BY PUNCH METHOD
Path Notes (To The Dermatopathologist): Non-specific rash - ddx:
Validate Triangulation: No
Consent: Written consent was obtained and risks were reviewed including but not limited to scarring, infection, bleeding, scabbing, incomplete removal, nerve damage and allergy to anesthesia.
Dressing: bandage
Punch Size In Mm: 4
Was A Bandage Applied: Yes
Post-Care Instructions: I reviewed with the patient in detail post-care instructions. Patient is to keep the biopsy site dry overnight, and then apply petrolatum twice daily until healed. Patient may apply hydrogen peroxide soaks to remove any crusting.
Wound Care: Petrolatum
X Depth Of Punch In Cm (Optional): 0
Notification Instructions: Patient will be notified of biopsy results. However, patient instructed to call the office if not contacted within 2 weeks.
Anesthesia Type: 0.5% lidocaine with 1:200,000 epinephrine and a 1:10 solution of 8.4% sodium bicarbonate
Suture Removal: 14 days
Billing Type: Third-Party Bill
Information: Selecting Yes will display possible errors in your note based on the variables you have selected. This validation is only offered as a suggestion for you. PLEASE NOTE THAT THE VALIDATION TEXT WILL BE REMOVED WHEN YOU FINALIZE YOUR NOTE. IF YOU WANT TO FAX A PRELIMINARY NOTE YOU WILL NEED TO TOGGLE THIS TO 'NO' IF YOU DO NOT WANT IT IN YOUR FAXED NOTE.
Detail Level: Detailed
Biopsy Type: H and E
Epidermal Sutures: 4-0 Nylon
Anesthesia Volume In Cc (Will Not Render If 0): 0.5
Hemostasis: None
Home Suture Removal Text: Patient will remove their sutures at home.  If they have any questions or difficulties they will call the office.

## 2022-07-21 ENCOUNTER — APPOINTMENT (OUTPATIENT)
Dept: URBAN - METROPOLITAN AREA CLINIC 244 | Age: 82
Setting detail: DERMATOLOGY
End: 2022-07-24

## 2022-07-21 DIAGNOSIS — L92.0 GRANULOMA ANNULARE: ICD-10-CM

## 2022-07-21 DIAGNOSIS — L57.0 ACTINIC KERATOSIS: ICD-10-CM

## 2022-07-21 PROCEDURE — 17004 DESTROY PREMAL LESIONS 15/>: CPT

## 2022-07-21 PROCEDURE — OTHER LIQUID NITROGEN: OTHER

## 2022-07-21 PROCEDURE — OTHER COUNSELING: OTHER

## 2022-07-21 PROCEDURE — 99212 OFFICE O/P EST SF 10 MIN: CPT | Mod: 25

## 2022-07-21 PROCEDURE — OTHER SUTURE REMOVAL (NO GLOBAL PERIOD): OTHER

## 2022-07-21 ASSESSMENT — LOCATION DETAILED DESCRIPTION DERM
LOCATION DETAILED: LEFT CENTRAL MALAR CHEEK
LOCATION DETAILED: RIGHT SUPERIOR PARIETAL SCALP
LOCATION DETAILED: LEFT LATERAL ABDOMEN

## 2022-07-21 ASSESSMENT — LOCATION SIMPLE DESCRIPTION DERM
LOCATION SIMPLE: LEFT CHEEK
LOCATION SIMPLE: SCALP
LOCATION SIMPLE: ABDOMEN

## 2022-07-21 ASSESSMENT — LOCATION ZONE DERM
LOCATION ZONE: TRUNK
LOCATION ZONE: SCALP
LOCATION ZONE: FACE

## 2022-07-21 NOTE — PROCEDURE: LIQUID NITROGEN
Render Post-Care Instructions In Note?: no
Duration Of Freeze Thaw-Cycle (Seconds): 0
Post-Care Instructions: I reviewed with the patient in detail post-care instructions. Patient is to wear sunprotection, and avoid picking at any of the treated lesions. Pt may apply Vaseline to crusted or scabbing areas.
Detail Level: Zone
Consent: The patient's consent was obtained including but not limited to risks of crusting, scabbing, blistering, scarring, darker or lighter pigmentary change, recurrence, incomplete removal and infection.
Number Of Freeze-Thaw Cycles: 1 freeze-thaw cycle
Total Number Of Aks Treated: 27

## 2022-08-03 RX ORDER — ATORVASTATIN CALCIUM 40 MG/1
TABLET, FILM COATED ORAL
Qty: 90 TABLET | Refills: 1 | Status: SHIPPED | OUTPATIENT
Start: 2022-08-03 | End: 2023-03-29

## 2022-08-04 ENCOUNTER — TELEPHONE (OUTPATIENT)
Dept: INTERNAL MEDICINE CLINIC | Facility: CLINIC | Age: 82
End: 2022-08-04

## 2022-08-04 ENCOUNTER — MED REC SCAN ONLY (OUTPATIENT)
Dept: INTERNAL MEDICINE CLINIC | Facility: CLINIC | Age: 82
End: 2022-08-04

## 2022-08-04 PROBLEM — H25.811 COMBINED FORMS OF AGE-RELATED CATARACT OF RIGHT EYE: Status: ACTIVE | Noted: 2022-08-04

## 2022-08-04 NOTE — TELEPHONE ENCOUNTER
Pt on the phone will like a referral from Dr. Hernando Fowler to be seen with Dr. Maira Navarro in Ophthalmology. Pt was inform he will need a referral from PCP.

## 2022-08-05 ENCOUNTER — TELEPHONE (OUTPATIENT)
Dept: CASE MANAGEMENT | Age: 82
End: 2022-08-05

## 2022-08-05 DIAGNOSIS — E11.9 TYPE 2 DIABETES MELLITUS WITHOUT COMPLICATION, WITHOUT LONG-TERM CURRENT USE OF INSULIN (HCC): ICD-10-CM

## 2022-08-05 DIAGNOSIS — H25.9 AGE-RELATED CATARACT OF BOTH EYES, UNSPECIFIED AGE-RELATED CATARACT TYPE: ICD-10-CM

## 2022-08-05 DIAGNOSIS — H40.009 BORDERLINE GLAUCOMA, UNSPECIFIED LATERALITY: Primary | ICD-10-CM

## 2022-08-05 NOTE — TELEPHONE ENCOUNTER
Dr. Rosalina Caruso,    Patient requesting referral for Dr. Mauricio Alvarado for follow up visits. Pended referral please review diagnosis and sign off if you agree. Thank you.   Franklin Helm

## 2022-08-18 RX ORDER — LISINOPRIL 5 MG/1
5 TABLET ORAL DAILY
Qty: 90 TABLET | Refills: 1 | Status: SHIPPED | OUTPATIENT
Start: 2022-08-18 | End: 2023-01-29

## 2022-09-10 DIAGNOSIS — E11.65 UNCONTROLLED TYPE 2 DIABETES MELLITUS WITH HYPERGLYCEMIA (HCC): ICD-10-CM

## 2022-09-13 ENCOUNTER — TELEPHONE (OUTPATIENT)
Dept: INTERNAL MEDICINE CLINIC | Facility: CLINIC | Age: 82
End: 2022-09-13

## 2022-12-29 ENCOUNTER — OFFICE VISIT (OUTPATIENT)
Dept: INTERNAL MEDICINE CLINIC | Facility: CLINIC | Age: 82
End: 2022-12-29
Payer: MEDICARE

## 2022-12-29 ENCOUNTER — TELEPHONE (OUTPATIENT)
Dept: INTERNAL MEDICINE CLINIC | Facility: CLINIC | Age: 82
End: 2022-12-29

## 2022-12-29 VITALS
WEIGHT: 180 LBS | RESPIRATION RATE: 16 BRPM | BODY MASS INDEX: 27.28 KG/M2 | DIASTOLIC BLOOD PRESSURE: 68 MMHG | SYSTOLIC BLOOD PRESSURE: 118 MMHG | HEIGHT: 68 IN | TEMPERATURE: 97 F | OXYGEN SATURATION: 97 % | HEART RATE: 92 BPM

## 2022-12-29 DIAGNOSIS — N13.5 URETERAL STRICTURE: ICD-10-CM

## 2022-12-29 DIAGNOSIS — R91.1 PULMONARY NODULE: ICD-10-CM

## 2022-12-29 DIAGNOSIS — E87.5 HYPERKALEMIA: ICD-10-CM

## 2022-12-29 DIAGNOSIS — Z86.010 HISTORY OF ADENOMATOUS POLYP OF COLON: ICD-10-CM

## 2022-12-29 DIAGNOSIS — Z91.013 ALLERGY TO SEAFOOD: ICD-10-CM

## 2022-12-29 DIAGNOSIS — H40.009 BORDERLINE GLAUCOMA, UNSPECIFIED LATERALITY: ICD-10-CM

## 2022-12-29 DIAGNOSIS — Z85.46 PERSONAL HISTORY OF PROSTATE CANCER: ICD-10-CM

## 2022-12-29 DIAGNOSIS — G89.29 CHRONIC PAIN OF RIGHT KNEE: ICD-10-CM

## 2022-12-29 DIAGNOSIS — M21.371 RIGHT FOOT DROP: ICD-10-CM

## 2022-12-29 DIAGNOSIS — I10 ESSENTIAL HYPERTENSION: ICD-10-CM

## 2022-12-29 DIAGNOSIS — Z71.85 VACCINE COUNSELING: ICD-10-CM

## 2022-12-29 DIAGNOSIS — E78.2 MIXED HYPERLIPIDEMIA: ICD-10-CM

## 2022-12-29 DIAGNOSIS — E11.9 TYPE 2 DIABETES MELLITUS WITHOUT COMPLICATION, WITHOUT LONG-TERM CURRENT USE OF INSULIN (HCC): ICD-10-CM

## 2022-12-29 DIAGNOSIS — R93.41 ABNORMAL COMPUTED TOMOGRAPHY OF URETER: ICD-10-CM

## 2022-12-29 DIAGNOSIS — H25.9 AGE-RELATED CATARACT OF BOTH EYES, UNSPECIFIED AGE-RELATED CATARACT TYPE: ICD-10-CM

## 2022-12-29 DIAGNOSIS — E83.42 HYPOMAGNESEMIA: ICD-10-CM

## 2022-12-29 DIAGNOSIS — R79.89 ABNORMAL TSH: ICD-10-CM

## 2022-12-29 DIAGNOSIS — E83.52 HYPERCALCEMIA: ICD-10-CM

## 2022-12-29 DIAGNOSIS — H04.123 DRY EYE SYNDROME OF BILATERAL LACRIMAL GLANDS: ICD-10-CM

## 2022-12-29 DIAGNOSIS — D80.1 HYPOGAMMAGLOBULINEMIA (HCC): ICD-10-CM

## 2022-12-29 DIAGNOSIS — R79.89 ELEVATED PTHRP LEVEL: ICD-10-CM

## 2022-12-29 DIAGNOSIS — Z00.00 ENCOUNTER FOR ANNUAL HEALTH EXAMINATION: ICD-10-CM

## 2022-12-29 DIAGNOSIS — R76.11 POSITIVE PPD: ICD-10-CM

## 2022-12-29 DIAGNOSIS — D73.5 SPLENIC INFARCT: ICD-10-CM

## 2022-12-29 DIAGNOSIS — M25.561 CHRONIC PAIN OF RIGHT KNEE: ICD-10-CM

## 2022-12-29 DIAGNOSIS — H25.811 COMBINED FORMS OF AGE-RELATED CATARACT OF RIGHT EYE: ICD-10-CM

## 2022-12-29 DIAGNOSIS — M48.062 LUMBAR STENOSIS WITH NEUROGENIC CLAUDICATION: ICD-10-CM

## 2022-12-29 DIAGNOSIS — D50.8 OTHER IRON DEFICIENCY ANEMIA: ICD-10-CM

## 2022-12-29 DIAGNOSIS — R93.5 ABNORMAL CT OF THE ABDOMEN: ICD-10-CM

## 2022-12-29 DIAGNOSIS — N18.32 STAGE 3B CHRONIC KIDNEY DISEASE (HCC): Primary | Chronic | ICD-10-CM

## 2022-12-29 DIAGNOSIS — Z80.0 FAMILY HISTORY OF COLON CANCER: ICD-10-CM

## 2022-12-29 LAB
APPEARANCE: CLEAR
BILIRUBIN: NEGATIVE
GLUCOSE (URINE DIPSTICK): NEGATIVE MG/DL
LEUKOCYTES: NEGATIVE
MULTISTIX LOT#: NORMAL NUMERIC
NITRITE, URINE: NEGATIVE
OCCULT BLOOD: NEGATIVE
PH, URINE: 5.5 (ref 4.5–8)
PROTEIN (URINE DIPSTICK): NEGATIVE MG/DL
SPECIFIC GRAVITY: 1.03 (ref 1–1.03)
URINE-COLOR: YELLOW
UROBILINOGEN,SEMI-QN: 0.2 MG/DL (ref 0–1.9)

## 2022-12-29 PROCEDURE — 3008F BODY MASS INDEX DOCD: CPT | Performed by: INTERNAL MEDICINE

## 2022-12-29 PROCEDURE — 96160 PT-FOCUSED HLTH RISK ASSMT: CPT | Performed by: INTERNAL MEDICINE

## 2022-12-29 PROCEDURE — 3078F DIAST BP <80 MM HG: CPT | Performed by: INTERNAL MEDICINE

## 2022-12-29 PROCEDURE — 1126F AMNT PAIN NOTED NONE PRSNT: CPT | Performed by: INTERNAL MEDICINE

## 2022-12-29 PROCEDURE — 90662 IIV NO PRSV INCREASED AG IM: CPT | Performed by: INTERNAL MEDICINE

## 2022-12-29 PROCEDURE — 99397 PER PM REEVAL EST PAT 65+ YR: CPT | Performed by: INTERNAL MEDICINE

## 2022-12-29 PROCEDURE — G0439 PPPS, SUBSEQ VISIT: HCPCS | Performed by: INTERNAL MEDICINE

## 2022-12-29 PROCEDURE — G0008 ADMIN INFLUENZA VIRUS VAC: HCPCS | Performed by: INTERNAL MEDICINE

## 2022-12-29 PROCEDURE — 3074F SYST BP LT 130 MM HG: CPT | Performed by: INTERNAL MEDICINE

## 2022-12-29 RX ORDER — ISOPROPYL ALCOHOL 70 ML/100ML
SWAB TOPICAL
COMMUNITY
Start: 2022-11-29

## 2022-12-29 RX ORDER — GLIMEPIRIDE 1 MG/1
1 TABLET ORAL 2 TIMES DAILY
Qty: 180 TABLET | Refills: 1 | Status: SHIPPED | OUTPATIENT
Start: 2022-12-29

## 2022-12-29 RX ORDER — GLUCOSAM/CHON-MSM1/C/MANG/BOSW 500-416.6
TABLET ORAL
COMMUNITY
Start: 2022-11-29

## 2023-01-03 NOTE — TELEPHONE ENCOUNTER
Called patient, no answer. Left VM to call us back to confirm if ok to schedule his 4mos f/u visit for May 2nd @ 5pm with Dr Theron Reza. If no call back, we cannot guarantee this appt will be available for him.  - please f/u on this if he has not called back to schedule.   Thanks

## 2023-01-13 LAB
% SATURATION: 27 % (CALC) (ref 20–48)
ABSOLUTE BASOPHILS: 51 CELLS/UL (ref 0–200)
ABSOLUTE EOSINOPHILS: 717 CELLS/UL (ref 15–500)
ABSOLUTE LYMPHOCYTES: 1402 CELLS/UL (ref 850–3900)
ABSOLUTE MONOCYTES: 544 CELLS/UL (ref 200–950)
ABSOLUTE NEUTROPHILS: 3686 CELLS/UL (ref 1500–7800)
BASOPHILS: 0.8 %
CHOL/HDLC RATIO: 3.2 (CALC)
CHOLESTEROL, TOTAL: 129 MG/DL
EOSINOPHILS: 11.2 %
FERRITIN: 12 NG/ML (ref 24–380)
HDL CHOLESTEROL: 40 MG/DL
HEMATOCRIT: 40.4 % (ref 38.5–50)
HEMOGLOBIN A1C: 8.2 % OF TOTAL HGB
HEMOGLOBIN: 13.7 G/DL (ref 13.2–17.1)
IRON BINDING CAPACITY: 380 MCG/DL (CALC) (ref 250–425)
IRON, TOTAL: 101 MCG/DL (ref 50–180)
LDL-CHOLESTEROL: 63 MG/DL (CALC)
LYMPHOCYTES: 21.9 %
MAGNESIUM: 1.8 MG/DL (ref 1.5–2.5)
MCH: 31.6 PG (ref 27–33)
MCHC: 33.9 G/DL (ref 32–36)
MCV: 93.3 FL (ref 80–100)
MONOCYTES: 8.5 %
MPV: 9.4 FL (ref 7.5–12.5)
NEUTROPHILS: 57.6 %
NON-HDL CHOLESTEROL: 89 MG/DL (CALC)
PLATELET COUNT: 348 THOUSAND/UL (ref 140–400)
RDW: 12.2 % (ref 11–15)
RED BLOOD CELL COUNT: 4.33 MILLION/UL (ref 4.2–5.8)
T4, FREE: 1.1 NG/DL (ref 0.8–1.8)
TRIGLYCERIDES: 188 MG/DL
TSH: 3.87 MIU/L (ref 0.4–4.5)
WHITE BLOOD CELL COUNT: 6.4 THOUSAND/UL (ref 3.8–10.8)

## 2023-02-17 ENCOUNTER — TELEPHONE (OUTPATIENT)
Dept: INTERNAL MEDICINE CLINIC | Facility: CLINIC | Age: 83
End: 2023-02-17

## 2023-02-17 RX ORDER — GLUCOSAMINE HCL/CHONDROITIN SU 500-400 MG
CAPSULE ORAL
Qty: 100 EACH | Refills: 0 | Status: SHIPPED | OUTPATIENT
Start: 2023-02-17

## 2023-02-17 RX ORDER — ISOPROPYL ALCOHOL 70 ML/100ML
1 SWAB TOPICAL AS DIRECTED
Qty: 100 EACH | Refills: 5 | Status: CANCELLED | OUTPATIENT
Start: 2023-02-17

## 2023-03-14 NOTE — TELEPHONE ENCOUNTER
1529 Pt has been seen by psychiatry who is recommending inpatient psychiatric admission. All labs are resulted and pt is medically clear. [facility] faxing referral to [facility].   1608 Call from [facility], pt accepted  Accpeting Physician: Dr Mckinnon   Bed: ICU  N2N: [phone]  [facility] Faxing updated pink slip and scheduling transport   [facility] ETA 1700     Nay Moon, ABY  03/14/23 7310     Appointment scheduled

## 2023-05-02 ENCOUNTER — OFFICE VISIT (OUTPATIENT)
Dept: INTERNAL MEDICINE CLINIC | Facility: CLINIC | Age: 83
End: 2023-05-02

## 2023-05-02 VITALS
RESPIRATION RATE: 16 BRPM | WEIGHT: 183 LBS | OXYGEN SATURATION: 95 % | HEIGHT: 68 IN | TEMPERATURE: 97 F | BODY MASS INDEX: 27.74 KG/M2 | DIASTOLIC BLOOD PRESSURE: 71 MMHG | HEART RATE: 89 BPM | SYSTOLIC BLOOD PRESSURE: 111 MMHG

## 2023-05-02 DIAGNOSIS — N18.32 STAGE 3B CHRONIC KIDNEY DISEASE (HCC): Primary | Chronic | ICD-10-CM

## 2023-05-02 DIAGNOSIS — D50.8 OTHER IRON DEFICIENCY ANEMIA: ICD-10-CM

## 2023-05-02 DIAGNOSIS — R79.89 ABNORMAL TSH: ICD-10-CM

## 2023-05-02 DIAGNOSIS — E11.9 TYPE 2 DIABETES MELLITUS WITHOUT COMPLICATION, WITHOUT LONG-TERM CURRENT USE OF INSULIN (HCC): ICD-10-CM

## 2023-05-02 DIAGNOSIS — M25.532 LEFT WRIST PAIN: ICD-10-CM

## 2023-05-02 DIAGNOSIS — R91.1 PULMONARY NODULE: ICD-10-CM

## 2023-05-02 DIAGNOSIS — R79.89 ELEVATED PTHRP LEVEL: ICD-10-CM

## 2023-05-02 DIAGNOSIS — E78.2 MIXED HYPERLIPIDEMIA: ICD-10-CM

## 2023-05-02 DIAGNOSIS — I10 ESSENTIAL HYPERTENSION: ICD-10-CM

## 2023-05-02 DIAGNOSIS — Z71.85 VACCINE COUNSELING: ICD-10-CM

## 2023-05-02 DIAGNOSIS — E87.5 HYPERKALEMIA: ICD-10-CM

## 2023-05-02 DIAGNOSIS — E83.52 HYPERCALCEMIA: ICD-10-CM

## 2023-05-02 DIAGNOSIS — E83.42 HYPOMAGNESEMIA: ICD-10-CM

## 2023-05-02 PROCEDURE — 3078F DIAST BP <80 MM HG: CPT | Performed by: INTERNAL MEDICINE

## 2023-05-02 PROCEDURE — 99214 OFFICE O/P EST MOD 30 MIN: CPT | Performed by: INTERNAL MEDICINE

## 2023-05-02 PROCEDURE — 1159F MED LIST DOCD IN RCRD: CPT | Performed by: INTERNAL MEDICINE

## 2023-05-02 PROCEDURE — 3074F SYST BP LT 130 MM HG: CPT | Performed by: INTERNAL MEDICINE

## 2023-05-02 PROCEDURE — 36415 COLL VENOUS BLD VENIPUNCTURE: CPT | Performed by: INTERNAL MEDICINE

## 2023-05-02 PROCEDURE — 3008F BODY MASS INDEX DOCD: CPT | Performed by: INTERNAL MEDICINE

## 2023-05-02 PROCEDURE — 1160F RVW MEDS BY RX/DR IN RCRD: CPT | Performed by: INTERNAL MEDICINE

## 2023-05-03 LAB
ALBUMIN SERPL-MCNC: 3.6 G/DL (ref 3.4–5)
ALBUMIN/GLOB SERPL: 1.2 {RATIO} (ref 1–2)
ALP LIVER SERPL-CCNC: 91 U/L
ALT SERPL-CCNC: 36 U/L
ANION GAP SERPL CALC-SCNC: 8 MMOL/L (ref 0–18)
AST SERPL-CCNC: 23 U/L (ref 15–37)
BILIRUB SERPL-MCNC: 0.2 MG/DL (ref 0.1–2)
BUN BLD-MCNC: 24 MG/DL (ref 7–18)
BUN/CREAT SERPL: 14.8 (ref 10–20)
CALCIUM BLD-MCNC: 9.4 MG/DL (ref 8.5–10.1)
CHLORIDE SERPL-SCNC: 109 MMOL/L (ref 98–112)
CHOLEST SERPL-MCNC: 117 MG/DL (ref ?–200)
CO2 SERPL-SCNC: 24 MMOL/L (ref 21–32)
CREAT BLD-MCNC: 1.62 MG/DL
EST. AVERAGE GLUCOSE BLD GHB EST-MCNC: 177 MG/DL (ref 68–126)
FASTING PATIENT LIPID ANSWER: YES
FASTING STATUS PATIENT QL REPORTED: YES
GFR SERPLBLD BASED ON 1.73 SQ M-ARVRAT: 42 ML/MIN/1.73M2 (ref 60–?)
GLOBULIN PLAS-MCNC: 3.1 G/DL (ref 2.8–4.4)
GLUCOSE BLD-MCNC: 166 MG/DL (ref 70–99)
HBA1C MFR BLD: 7.8 % (ref ?–5.7)
HDLC SERPL-MCNC: 36 MG/DL (ref 40–59)
LDLC SERPL CALC-MCNC: 47 MG/DL (ref ?–100)
NONHDLC SERPL-MCNC: 81 MG/DL (ref ?–130)
OSMOLALITY SERPL CALC.SUM OF ELEC: 300 MOSM/KG (ref 275–295)
POTASSIUM SERPL-SCNC: 4.8 MMOL/L (ref 3.5–5.1)
PROT SERPL-MCNC: 6.7 G/DL (ref 6.4–8.2)
SODIUM SERPL-SCNC: 141 MMOL/L (ref 136–145)
TRIGL SERPL-MCNC: 214 MG/DL (ref 30–149)
VLDLC SERPL CALC-MCNC: 30 MG/DL (ref 0–30)

## 2023-05-03 NOTE — PROGRESS NOTES
CMP Normal (electrolytes, and liver functions), declining kidney function is similar to prior. No motrin, ibuprofen, advil, alleve, naprosyn  with these medications. Needs some other blood. Orders written Nonfasting. Lipid (choilesterol) shows triglycerides elevated at 214 goal is less than 150. Triglycerides may be elevated from higher carbs. Lower carbs helps lower triglycerides. Also HDL is only at 36 goal is above 50. Would add fish oil at thousand a day to help to help lower triglycerides. If not taking atorvastatin to begin. If taking atorvastatin which changed to Crestor 20 mg which is more potent. Recheck lipid in 3 months. Careful with diet. Avoid red meat, shellfish, pork. Try not to webster foods. Lower carb diet. Exercise is most part at least 30 minutes 3-4 times a week sustained cardiovascular aerobic exercise getting that heart rate going and keeping it going for 30 minutes at a time. If cannot do 30 will start with 10 minutes of brisk walking is good at each week build up 5 minutes more. Hemoglobin A1c which is a 3-month average of sugars is slightly better but not significantly. Goal is 6.5 or less. Would recommend at this point may be to see endocrinology and go over medications.

## 2023-05-05 RX ORDER — ROSUVASTATIN CALCIUM 20 MG/1
20 TABLET, COATED ORAL NIGHTLY
Qty: 30 TABLET | Refills: 3 | Status: SHIPPED | OUTPATIENT
Start: 2023-05-05 | End: 2023-05-05

## 2023-05-05 RX ORDER — ISOPROPYL ALCOHOL 70 ML/100ML
1 SWAB TOPICAL AS DIRECTED
Qty: 100 EACH | Refills: 5 | Status: SHIPPED | OUTPATIENT
Start: 2023-05-05

## 2023-05-05 RX ORDER — ROSUVASTATIN CALCIUM 20 MG/1
20 TABLET, COATED ORAL NIGHTLY
Qty: 90 TABLET | Refills: 1 | Status: SHIPPED | OUTPATIENT
Start: 2023-05-05

## 2023-05-15 ENCOUNTER — TELEPHONE (OUTPATIENT)
Dept: INTERNAL MEDICINE CLINIC | Facility: CLINIC | Age: 83
End: 2023-05-15

## 2023-05-24 ENCOUNTER — TELEPHONE (OUTPATIENT)
Dept: INTERNAL MEDICINE CLINIC | Facility: CLINIC | Age: 83
End: 2023-05-24

## 2023-05-24 NOTE — TELEPHONE ENCOUNTER
If he can get into see Dr. Nicolle Lu or any of the nurse practitioners with endocrine [facility] etc.

## 2023-05-24 NOTE — TELEPHONE ENCOUNTER
Spoke with the patient,verified full name and     Informed him of message and he will call them back and also place himself on the waitlist

## 2023-05-24 NOTE — TELEPHONE ENCOUNTER
Patient is calling and states that he called to schedule his appt with  office and she has no openings until January 2024. He was wondering if he can see someone else.

## 2023-06-06 ENCOUNTER — LAB ENCOUNTER (OUTPATIENT)
Dept: LAB | Age: 83
End: 2023-06-06
Attending: INTERNAL MEDICINE
Payer: MEDICARE

## 2023-06-06 DIAGNOSIS — N18.32 STAGE 3B CHRONIC KIDNEY DISEASE (HCC): ICD-10-CM

## 2023-06-06 LAB
C3 SERPL-MCNC: 134 MG/DL (ref 90–180)
C4 SERPL-MCNC: 28.9 MG/DL (ref 10–40)
HAV AB SER QL IA: NONREACTIVE
HBV CORE AB SERPL QL IA: NONREACTIVE
HBV SURFACE AB SER QL: NONREACTIVE
HBV SURFACE AB SERPL IA-ACNC: <3.1 MIU/ML
HBV SURFACE AG SERPL QL IA: NONREACTIVE
HCV AB SERPL QL IA: NONREACTIVE
IGA SERPL-MCNC: 176 MG/DL (ref 70–312)
IGM SERPL-MCNC: 17.1 MG/DL (ref 43–279)
IMMUNOGLOBULIN PNL SER-MCNC: 503 MG/DL (ref 791–1643)

## 2023-06-06 PROCEDURE — 86036 ANCA SCREEN EACH ANTIBODY: CPT

## 2023-06-06 PROCEDURE — 83516 IMMUNOASSAY NONANTIBODY: CPT

## 2023-06-06 PROCEDURE — 86334 IMMUNOFIX E-PHORESIS SERUM: CPT

## 2023-06-06 PROCEDURE — 86706 HEP B SURFACE ANTIBODY: CPT

## 2023-06-06 PROCEDURE — 86708 HEPATITIS A ANTIBODY: CPT

## 2023-06-06 PROCEDURE — 84165 PROTEIN E-PHORESIS SERUM: CPT

## 2023-06-06 PROCEDURE — 80503 PATH CLIN CONSLTJ SF 5-20: CPT

## 2023-06-06 PROCEDURE — 86160 COMPLEMENT ANTIGEN: CPT

## 2023-06-06 PROCEDURE — 86037 ANCA TITER EACH ANTIBODY: CPT

## 2023-06-06 PROCEDURE — 36415 COLL VENOUS BLD VENIPUNCTURE: CPT

## 2023-06-06 PROCEDURE — 86704 HEP B CORE ANTIBODY TOTAL: CPT

## 2023-06-06 PROCEDURE — 87340 HEPATITIS B SURFACE AG IA: CPT

## 2023-06-06 PROCEDURE — 86803 HEPATITIS C AB TEST: CPT

## 2023-06-06 PROCEDURE — 86162 COMPLEMENT TOTAL (CH50): CPT

## 2023-06-06 PROCEDURE — 83521 IG LIGHT CHAINS FREE EACH: CPT

## 2023-06-06 PROCEDURE — 86038 ANTINUCLEAR ANTIBODIES: CPT

## 2023-06-06 PROCEDURE — 85652 RBC SED RATE AUTOMATED: CPT | Performed by: INTERNAL MEDICINE

## 2023-06-06 PROCEDURE — 82784 ASSAY IGA/IGD/IGG/IGM EACH: CPT

## 2023-06-07 LAB
ANTI-MPO ANTIBODIES: <0.2 UNITS
ANTI-PR3 ANTIBODIES: <0.2 UNITS
CH50 COMPLEMENT: >60 U/ML

## 2023-06-08 ENCOUNTER — OFFICE VISIT (OUTPATIENT)
Dept: ENDOCRINOLOGY CLINIC | Facility: CLINIC | Age: 83
End: 2023-06-08

## 2023-06-08 VITALS
WEIGHT: 184 LBS | HEART RATE: 68 BPM | SYSTOLIC BLOOD PRESSURE: 109 MMHG | BODY MASS INDEX: 28 KG/M2 | DIASTOLIC BLOOD PRESSURE: 68 MMHG

## 2023-06-08 DIAGNOSIS — E11.65 UNCONTROLLED TYPE 2 DIABETES MELLITUS WITH HYPERGLYCEMIA (HCC): Primary | ICD-10-CM

## 2023-06-08 LAB
ALBUMIN SERPL ELPH-MCNC: 4.04 G/DL (ref 3.75–5.21)
ALBUMIN/GLOB SERPL: 1.79 {RATIO} (ref 1–2)
ALPHA1 GLOB SERPL ELPH-MCNC: 0.26 G/DL (ref 0.19–0.46)
ALPHA2 GLOB SERPL ELPH-MCNC: 0.72 G/DL (ref 0.48–1.05)
B-GLOBULIN SERPL ELPH-MCNC: 0.79 G/DL (ref 0.68–1.23)
GAMMA GLOB SERPL ELPH-MCNC: 0.49 G/DL (ref 0.62–1.7)
GLUCOSE BLOOD: 261
KAPPA LC FREE SER-MCNC: 3.42 MG/DL (ref 0.33–1.94)
KAPPA LC FREE/LAMBDA FREE SER NEPH: 1.64 {RATIO} (ref 0.26–1.65)
LAMBDA LC FREE SERPL-MCNC: 2.08 MG/DL (ref 0.57–2.63)
NUCLEAR IGG TITR SER IF: NEGATIVE {TITER}
PROT SERPL-MCNC: 6.3 G/DL (ref 6.4–8.2)
TEST STRIP LOT #: NORMAL NUMERIC

## 2023-06-08 PROCEDURE — 99204 OFFICE O/P NEW MOD 45 MIN: CPT

## 2023-06-08 PROCEDURE — 3078F DIAST BP <80 MM HG: CPT

## 2023-06-08 PROCEDURE — 3074F SYST BP LT 130 MM HG: CPT

## 2023-06-08 PROCEDURE — 82947 ASSAY GLUCOSE BLOOD QUANT: CPT

## 2023-06-08 PROCEDURE — 1159F MED LIST DOCD IN RCRD: CPT

## 2023-06-08 PROCEDURE — 1160F RVW MEDS BY RX/DR IN RCRD: CPT

## 2023-06-08 RX ORDER — LINAGLIPTIN 5 MG/1
5 TABLET, FILM COATED ORAL DAILY
Qty: 30 TABLET | Refills: 3 | Status: SHIPPED | OUTPATIENT
Start: 2023-06-08 | End: 2024-06-07

## 2023-06-08 NOTE — PATIENT INSTRUCTIONS
Continue Metformin 1000mg twice daily. Increase water intake. Recheck kidney function in one month       Stop Glimepiride    Start Tradjenta 5mg once daily - call to let me know if the medication is too expensive.

## 2023-06-09 ENCOUNTER — TELEPHONE (OUTPATIENT)
Dept: INTERNAL MEDICINE CLINIC | Facility: CLINIC | Age: 83
End: 2023-06-09

## 2023-06-09 LAB — ANTI-GBM ANTIBODIES: <0.2 UNITS

## 2023-06-09 NOTE — TELEPHONE ENCOUNTER
Patient is calling to ask why he was advised by the Endocrinologist, Garrett Sanchez to take the Tradjenta medication when his lab orders were fine. Patient was advised to stop taking Glimepiride when this medication seems to be working fine. Please advise.

## 2023-06-15 LAB
ANCA BY IFA (RDL): NEGATIVE
ANTI-MPO AB (RDL): <20 UNITS
ANTI-PR-3 AB (RDL): <20 UNITS

## 2023-06-16 ENCOUNTER — TELEPHONE (OUTPATIENT)
Dept: ENDOCRINOLOGY CLINIC | Facility: CLINIC | Age: 83
End: 2023-06-16

## 2023-06-16 NOTE — TELEPHONE ENCOUNTER
ANDREW Chavez,     Please advise reason regarding stopping Glimeperide and starting on Tradjenta    LOV 6/8/23  - Continue Metformin 1000mg PO BID. Recheck BMP in 1 month. Reviewed importance of improved hydration. May need decrease in Metformin if GFR continues to be <45  - Stop Glimepiride  - Start Tradjenta 5mg PO daily in the morning. Reviewed side effects and risks vs benefits of medication. Reviewed that he should contact clinic if medication is too costly.

## 2023-06-16 NOTE — TELEPHONE ENCOUNTER
We had discussed stopping Glimepiride because he had some episodes of hypoglycemia on the medication. Glimepiride can cause low blood sugars where Booker Semen does not cause low blood sugars and so is safer in that way. If he prefers to continue with Glimepiride this would be ok we just need to cautiously monitor sugars. Sugars <75 can carry risk of dizziness, falls, and loss of consciousness when severe.

## 2023-06-16 NOTE — TELEPHONE ENCOUNTER
Spoke to patient, states he understands rationale during LOV. He does not recall reaching out to our office. Patient requesting to cancel Glimiperide Rx. Called pharmacy and Rx cancelled.

## 2023-06-23 ENCOUNTER — TELEPHONE (OUTPATIENT)
Dept: INTERNAL MEDICINE CLINIC | Facility: CLINIC | Age: 83
End: 2023-06-23

## 2023-06-27 ENCOUNTER — LAB ENCOUNTER (OUTPATIENT)
Dept: LAB | Age: 83
End: 2023-06-27
Attending: INTERNAL MEDICINE
Payer: MEDICARE

## 2023-06-27 DIAGNOSIS — E11.9 TYPE 2 DIABETES MELLITUS WITHOUT COMPLICATION, WITHOUT LONG-TERM CURRENT USE OF INSULIN (HCC): ICD-10-CM

## 2023-06-27 LAB
ALBUMIN SERPL-MCNC: 3.7 G/DL (ref 3.4–5)
ALBUMIN/GLOB SERPL: 1.2 {RATIO} (ref 1–2)
ALP LIVER SERPL-CCNC: 85 U/L
ALT SERPL-CCNC: 29 U/L
ANION GAP SERPL CALC-SCNC: 6 MMOL/L (ref 0–18)
AST SERPL-CCNC: 22 U/L (ref 15–37)
BILIRUB SERPL-MCNC: 0.5 MG/DL (ref 0.1–2)
BUN BLD-MCNC: 21 MG/DL (ref 7–18)
CALCIUM BLD-MCNC: 9.2 MG/DL (ref 8.5–10.1)
CHLORIDE SERPL-SCNC: 110 MMOL/L (ref 98–112)
CHOLEST SERPL-MCNC: 96 MG/DL (ref ?–200)
CO2 SERPL-SCNC: 24 MMOL/L (ref 21–32)
CREAT BLD-MCNC: 1.58 MG/DL
FASTING PATIENT LIPID ANSWER: YES
FASTING STATUS PATIENT QL REPORTED: YES
GFR SERPLBLD BASED ON 1.73 SQ M-ARVRAT: 44 ML/MIN/1.73M2 (ref 60–?)
GLOBULIN PLAS-MCNC: 3.1 G/DL (ref 2.8–4.4)
GLUCOSE BLD-MCNC: 129 MG/DL (ref 70–99)
HDLC SERPL-MCNC: 39 MG/DL (ref 40–59)
LDLC SERPL CALC-MCNC: 32 MG/DL (ref ?–100)
NONHDLC SERPL-MCNC: 57 MG/DL (ref ?–130)
OSMOLALITY SERPL CALC.SUM OF ELEC: 295 MOSM/KG (ref 275–295)
POTASSIUM SERPL-SCNC: 4.9 MMOL/L (ref 3.5–5.1)
PROT SERPL-MCNC: 6.8 G/DL (ref 6.4–8.2)
SODIUM SERPL-SCNC: 140 MMOL/L (ref 136–145)
TRIGL SERPL-MCNC: 143 MG/DL (ref 30–149)
VLDLC SERPL CALC-MCNC: 19 MG/DL (ref 0–30)

## 2023-06-27 PROCEDURE — 80053 COMPREHEN METABOLIC PANEL: CPT

## 2023-06-27 PROCEDURE — 80061 LIPID PANEL: CPT

## 2023-06-27 PROCEDURE — 83036 HEMOGLOBIN GLYCOSYLATED A1C: CPT

## 2023-06-27 PROCEDURE — 36415 COLL VENOUS BLD VENIPUNCTURE: CPT

## 2023-06-28 LAB
EST. AVERAGE GLUCOSE BLD GHB EST-MCNC: 171 MG/DL (ref 68–126)
HBA1C MFR BLD: 7.6 % (ref ?–5.7)

## 2023-07-11 ENCOUNTER — TELEPHONE (OUTPATIENT)
Dept: INTERNAL MEDICINE CLINIC | Facility: CLINIC | Age: 83
End: 2023-07-11

## 2023-07-11 DIAGNOSIS — R21 RASH: Primary | ICD-10-CM

## 2023-07-11 NOTE — TELEPHONE ENCOUNTER
Patient is requesting referral.   ,   Name of specialist and specialty department : dermatologist, Dr James Cage  Reason for visit with the specialist:  bump on arm left arm, sore in pain  Address of the specialist office: [address]   Appointment date: none yet         CSS informed patient the turnaround time for referral is 5-7 business days. Patient was informed to check their [employer] account for referral status.

## 2023-07-11 NOTE — TELEPHONE ENCOUNTER
Dr. Valente Loya,     Patient is requesting a referral to Dr. Shahzad Guerrero for a painful spot on arm. Pended referral please review diagnosis and sign off if you agree. Thank you.   Franklin Helm

## 2023-07-17 ENCOUNTER — LAB ENCOUNTER (OUTPATIENT)
Dept: LAB | Age: 83
End: 2023-07-17
Attending: INTERNAL MEDICINE
Payer: MEDICARE

## 2023-07-17 LAB
M PROTEIN 24H UR ELPH-MRATE: 168 MG/24 HR (ref ?–149.1)
SPECIMEN VOL UR: 1500 ML

## 2023-07-18 RX ORDER — LEVOTHYROXINE SODIUM 0.05 MG/1
50 TABLET ORAL
Qty: 90 TABLET | Refills: 1 | Status: SHIPPED | OUTPATIENT
Start: 2023-07-18

## 2023-07-26 ENCOUNTER — TELEPHONE (OUTPATIENT)
Dept: CASE MANAGEMENT | Age: 83
End: 2023-07-26

## 2023-07-26 DIAGNOSIS — H40.009 BORDERLINE GLAUCOMA, UNSPECIFIED LATERALITY: Primary | ICD-10-CM

## 2023-07-26 DIAGNOSIS — E11.9 CONTROLLED TYPE 2 DIABETES MELLITUS WITHOUT COMPLICATION, WITHOUT LONG-TERM CURRENT USE OF INSULIN (HCC): ICD-10-CM

## 2023-07-26 DIAGNOSIS — H25.9 AGE-RELATED CATARACT OF BOTH EYES, UNSPECIFIED AGE-RELATED CATARACT TYPE: ICD-10-CM

## 2023-07-26 NOTE — TELEPHONE ENCOUNTER
Dr. Valente Loya,     Patient requesting a referral to Dr. Marcelo Sharif. Pended referral please review diagnosis and sign off if you agree. Thank you.     Franklin Helm

## 2023-07-27 ENCOUNTER — APPOINTMENT (OUTPATIENT)
Dept: URBAN - METROPOLITAN AREA CLINIC 244 | Age: 83
Setting detail: DERMATOLOGY
End: 2023-07-28

## 2023-07-27 DIAGNOSIS — L82.1 OTHER SEBORRHEIC KERATOSIS: ICD-10-CM

## 2023-07-27 DIAGNOSIS — D22 MELANOCYTIC NEVI: ICD-10-CM

## 2023-07-27 DIAGNOSIS — L81.4 OTHER MELANIN HYPERPIGMENTATION: ICD-10-CM

## 2023-07-27 DIAGNOSIS — L57.0 ACTINIC KERATOSIS: ICD-10-CM

## 2023-07-27 PROBLEM — D48.5 NEOPLASM OF UNCERTAIN BEHAVIOR OF SKIN: Status: ACTIVE | Noted: 2023-07-27

## 2023-07-27 PROBLEM — D22.5 MELANOCYTIC NEVI OF TRUNK: Status: ACTIVE | Noted: 2023-07-27

## 2023-07-27 PROCEDURE — OTHER BIOPSY BY PUNCH METHOD: OTHER

## 2023-07-27 PROCEDURE — OTHER COUNSELING: OTHER

## 2023-07-27 PROCEDURE — OTHER DEFER: OTHER

## 2023-07-27 PROCEDURE — 11104 PUNCH BX SKIN SINGLE LESION: CPT

## 2023-07-27 PROCEDURE — OTHER MIPS QUALITY: OTHER

## 2023-07-27 PROCEDURE — 99213 OFFICE O/P EST LOW 20 MIN: CPT | Mod: 25

## 2023-07-27 ASSESSMENT — LOCATION DETAILED DESCRIPTION DERM
LOCATION DETAILED: PERIUMBILICAL SKIN
LOCATION DETAILED: RIGHT CENTRAL MALAR CHEEK

## 2023-07-27 ASSESSMENT — LOCATION ZONE DERM
LOCATION ZONE: FACE
LOCATION ZONE: TRUNK

## 2023-07-27 ASSESSMENT — LOCATION SIMPLE DESCRIPTION DERM
LOCATION SIMPLE: ABDOMEN
LOCATION SIMPLE: RIGHT CHEEK

## 2023-08-08 ENCOUNTER — APPOINTMENT (OUTPATIENT)
Dept: URBAN - METROPOLITAN AREA CLINIC 244 | Age: 83
Setting detail: DERMATOLOGY
End: 2023-08-08

## 2023-08-08 DIAGNOSIS — L57.0 ACTINIC KERATOSIS: ICD-10-CM

## 2023-08-08 DIAGNOSIS — Z48.02 ENCOUNTER FOR REMOVAL OF SUTURES: ICD-10-CM

## 2023-08-08 PROBLEM — C44.629 SQUAMOUS CELL CARCINOMA OF SKIN OF LEFT UPPER LIMB, INCLUDING SHOULDER: Status: ACTIVE | Noted: 2023-08-08

## 2023-08-08 PROCEDURE — 17003 DESTRUCT PREMALG LES 2-14: CPT | Mod: 79

## 2023-08-08 PROCEDURE — OTHER SUTURE REMOVAL (GLOBAL PERIOD): OTHER

## 2023-08-08 PROCEDURE — OTHER DEFER: OTHER

## 2023-08-08 PROCEDURE — 17000 DESTRUCT PREMALG LESION: CPT | Mod: 79

## 2023-08-08 PROCEDURE — OTHER COUNSELING: OTHER

## 2023-08-08 PROCEDURE — OTHER LIQUID NITROGEN: OTHER

## 2023-08-08 PROCEDURE — 99024 POSTOP FOLLOW-UP VISIT: CPT

## 2023-08-08 ASSESSMENT — LOCATION SIMPLE DESCRIPTION DERM
LOCATION SIMPLE: LEFT EAR
LOCATION SIMPLE: LEFT FOREHEAD
LOCATION SIMPLE: LEFT ZYGOMA
LOCATION SIMPLE: RIGHT CHEEK
LOCATION SIMPLE: LEFT FOREARM

## 2023-08-08 ASSESSMENT — LOCATION ZONE DERM
LOCATION ZONE: EAR
LOCATION ZONE: FACE
LOCATION ZONE: ARM

## 2023-08-08 ASSESSMENT — LOCATION DETAILED DESCRIPTION DERM
LOCATION DETAILED: RIGHT CENTRAL MALAR CHEEK
LOCATION DETAILED: LEFT PROXIMAL DORSAL FOREARM
LOCATION DETAILED: LEFT CENTRAL ZYGOMA
LOCATION DETAILED: LEFT FOREHEAD
LOCATION DETAILED: LEFT INFERIOR HELIX

## 2023-08-08 NOTE — PROCEDURE: LIQUID NITROGEN
Detail Level: Zone
Consent: The patient's consent was obtained including but not limited to risks of crusting, scabbing, blistering, scarring, darker or lighter pigmentary change, recurrence, incomplete removal and infection.
Post-Care Instructions: I reviewed with the patient in detail post-care instructions. Patient is to wear sunprotection, and avoid picking at any of the treated lesions. Pt may apply Vaseline to crusted or scabbing areas.
Render Post-Care Instructions In Note?: no
Total Number Of Aks Treated: 3
Duration Of Freeze Thaw-Cycle (Seconds): 0

## 2023-08-08 NOTE — PROCEDURE: SUTURE REMOVAL (GLOBAL PERIOD)
Detail Level: Detailed
Add 46516 Cpt? (Important Note: In 2017 The Use Of 12900 Is Being Tracked By Cms To Determine Future Global Period Reimbursement For Global Periods): yes

## 2023-08-08 NOTE — PROCEDURE: DEFER
Size Of Lesion In Cm (Optional): 0
Procedure To Be Performed At Next Visit: Excision
Detail Level: Detailed
Introduction Text (Please End With A Colon): The following procedure was deferred:
Instructions (Optional): Exc scheduled on 8/28 with TTD.

## 2023-08-14 ENCOUNTER — NURSE TRIAGE (OUTPATIENT)
Dept: INTERNAL MEDICINE CLINIC | Facility: CLINIC | Age: 83
End: 2023-08-14

## 2023-08-14 ENCOUNTER — TELEPHONE (OUTPATIENT)
Dept: ENDOCRINOLOGY CLINIC | Facility: CLINIC | Age: 83
End: 2023-08-14

## 2023-08-14 DIAGNOSIS — R53.83 FATIGUE, UNSPECIFIED TYPE: Primary | ICD-10-CM

## 2023-08-14 NOTE — TELEPHONE ENCOUNTER
Since symptoms started around the time of tradjenta please ask him to stop the medication for the next week and see if symptoms improve. If these symptoms are not improved after stopping the medication he needs to be seen by PCP asap.

## 2023-08-14 NOTE — TELEPHONE ENCOUNTER
ANDREW Cohn English. Please advise. Patient states symptoms below around 3 weeks ago. Did not feel symptoms when starting Tradjenta around 23. Unsure if side effects are related to medication. Last 3 weeks- feeling weak, no appetite, weight loss (8lbs)     BG am fastin,128, 135    Medication Regimen:   Metformin 1000mg twice daily.    Tradjenta 5mg once daily

## 2023-08-14 NOTE — TELEPHONE ENCOUNTER
Patient is calling states that since starting new medication he has been feeling weak, tired, loss of appetite and weight loss.  Please call

## 2023-08-14 NOTE — TELEPHONE ENCOUNTER
Action Requested: Summary for Provider     []  Critical Lab, Recommendations Needed  [] Need Additional Advice  []   FYI    []   Need Orders  [] Need Medications Sent to Pharmacy  []  Other     SUMMARY: Per protocol disposition advised go to office now. Patient declines and states he prefers an update sent to Dr. Laquita Berkowitz and a call back with her recommendations. Advised patient to discuss side effects he is having with prescribing provider, verbalizes understanding. Transferred to endocrinology at x [phone]    Reason for call: Acute (Fatigue/weakness, decreased appetite)  Onset: 3 weeks ago    Spoke to patient (name and  of patient verified). He reports endocrinologist prescribed new medication. , Vernal Puls. He has been taking it for 55 days. A few weeks ago lost appetite, has been more tired and fatigued, stating \"I feel like I'm running out of gas. \" He explains he can only work on something for 5 minutes or so and then feels drained. For example, when he finishes his morning walk, he feels so tired he needs a nap which is unusual for him. Blood sugar this morning was 138. Patient states he feels it is the medicine and would like to wean off of it. Patient reports he had no issues on glyburide  Denies: unilateral numbness/weakness, low blood sugar readings, vomiting, diarrhea, trouble breathing, bloody/black bowel movements, and protocol questions marked with \"no\"    Dr. Laquita Berkowitz, FYI--patient refused appointment today with available internal medicine provider. Transferred to endocrinology to report possible side effects of new medication. Patient was requesting your advice/recommendations on stopping Tradjenta. Advised patient to discuss this with endocrinology provider.     Reason for Disposition   MODERATE weakness (i.e., interferes with work, school, normal activities) and cause unknown  (Exceptions: Weakness with acute minor illness, or weakness from poor fluid intake.)    Protocols used: Weakness (Generalized) and Fatigue-A-OH

## 2023-08-14 NOTE — TELEPHONE ENCOUNTER
Has he been checking sugars since the Tradjenta change. Sometimes low sugars or high sugars can cause fatigue itself. Definitely should follow-up with endocrine about what or other changes to be made. We can check labs to see if there is anything else that would cause of fatigue. Orders placed.

## 2023-08-14 NOTE — TELEPHONE ENCOUNTER
Spoke to patient (verified Name and ) and relayed Dr. Annabel Christina message below. He states he already spoke with the Endo APRN. He will complete the lab tests ordered by PCP. Patient verbalized understanding and had no further questions at this time.      Future Appointments   Date Time Provider Alisia Betancur   2023 10:15 AM ZULEYKA Rascon [facility]   2023  1:30 PM Roise Meigs., MD [id number]  [id number]

## 2023-08-15 ENCOUNTER — MED REC SCAN ONLY (OUTPATIENT)
Dept: INTERNAL MEDICINE CLINIC | Facility: CLINIC | Age: 83
End: 2023-08-15

## 2023-08-16 ENCOUNTER — LAB ENCOUNTER (OUTPATIENT)
Dept: LAB | Age: 83
End: 2023-08-16
Attending: INTERNAL MEDICINE
Payer: MEDICARE

## 2023-08-16 DIAGNOSIS — R53.83 FATIGUE, UNSPECIFIED TYPE: ICD-10-CM

## 2023-08-16 LAB
ALBUMIN SERPL-MCNC: 4 G/DL (ref 3.4–5)
ALBUMIN/GLOB SERPL: 1.3 {RATIO} (ref 1–2)
ALP LIVER SERPL-CCNC: 84 U/L
ALT SERPL-CCNC: 42 U/L
ANION GAP SERPL CALC-SCNC: 5 MMOL/L (ref 0–18)
AST SERPL-CCNC: 23 U/L (ref 15–37)
BASOPHILS # BLD AUTO: 0.04 X10(3) UL (ref 0–0.2)
BASOPHILS NFR BLD AUTO: 0.6 %
BILIRUB SERPL-MCNC: 0.4 MG/DL (ref 0.1–2)
BUN BLD-MCNC: 24 MG/DL (ref 7–18)
CALCIUM BLD-MCNC: 9.7 MG/DL (ref 8.5–10.1)
CHLORIDE SERPL-SCNC: 106 MMOL/L (ref 98–112)
CO2 SERPL-SCNC: 25 MMOL/L (ref 21–32)
CREAT BLD-MCNC: 1.91 MG/DL
EGFRCR SERPLBLD CKD-EPI 2021: 35 ML/MIN/1.73M2 (ref 60–?)
EOSINOPHIL # BLD AUTO: 0.41 X10(3) UL (ref 0–0.7)
EOSINOPHIL NFR BLD AUTO: 6.5 %
ERYTHROCYTE [DISTWIDTH] IN BLOOD BY AUTOMATED COUNT: 12.3 %
ERYTHROCYTE [SEDIMENTATION RATE] IN BLOOD: 7 MM/HR
FASTING STATUS PATIENT QL REPORTED: NO
GLOBULIN PLAS-MCNC: 3 G/DL (ref 2.8–4.4)
GLUCOSE BLD-MCNC: 164 MG/DL (ref 70–99)
HCT VFR BLD AUTO: 39.3 %
HGB BLD-MCNC: 12.7 G/DL
IMM GRANULOCYTES # BLD AUTO: 0.01 X10(3) UL (ref 0–1)
IMM GRANULOCYTES NFR BLD: 0.2 %
LYMPHOCYTES # BLD AUTO: 1.28 X10(3) UL (ref 1–4)
LYMPHOCYTES NFR BLD AUTO: 20.4 %
MCH RBC QN AUTO: 31.1 PG (ref 26–34)
MCHC RBC AUTO-ENTMCNC: 32.3 G/DL (ref 31–37)
MCV RBC AUTO: 96.1 FL
MONOCYTES # BLD AUTO: 0.65 X10(3) UL (ref 0.1–1)
MONOCYTES NFR BLD AUTO: 10.4 %
NEUTROPHILS # BLD AUTO: 3.89 X10 (3) UL (ref 1.5–7.7)
NEUTROPHILS # BLD AUTO: 3.89 X10(3) UL (ref 1.5–7.7)
NEUTROPHILS NFR BLD AUTO: 61.9 %
OSMOLALITY SERPL CALC.SUM OF ELEC: 290 MOSM/KG (ref 275–295)
PLATELET # BLD AUTO: 325 10(3)UL (ref 150–450)
POTASSIUM SERPL-SCNC: 4.9 MMOL/L (ref 3.5–5.1)
PROT SERPL-MCNC: 7 G/DL (ref 6.4–8.2)
RBC # BLD AUTO: 4.09 X10(6)UL
SODIUM SERPL-SCNC: 136 MMOL/L (ref 136–145)
TSI SER-ACNC: 2.04 MIU/ML (ref 0.36–3.74)
VIT B12 SERPL-MCNC: 434 PG/ML (ref 193–986)
VIT D+METAB SERPL-MCNC: 37.1 NG/ML (ref 30–100)
WBC # BLD AUTO: 6.3 X10(3) UL (ref 4–11)

## 2023-08-16 PROCEDURE — 36415 COLL VENOUS BLD VENIPUNCTURE: CPT

## 2023-08-16 PROCEDURE — 85025 COMPLETE CBC W/AUTO DIFF WBC: CPT

## 2023-08-16 PROCEDURE — 83540 ASSAY OF IRON: CPT | Performed by: INTERNAL MEDICINE

## 2023-08-16 PROCEDURE — 85652 RBC SED RATE AUTOMATED: CPT

## 2023-08-16 PROCEDURE — 82306 VITAMIN D 25 HYDROXY: CPT

## 2023-08-16 PROCEDURE — 82607 VITAMIN B-12: CPT

## 2023-08-16 PROCEDURE — 82728 ASSAY OF FERRITIN: CPT | Performed by: INTERNAL MEDICINE

## 2023-08-16 PROCEDURE — 80053 COMPREHEN METABOLIC PANEL: CPT

## 2023-08-16 PROCEDURE — 83550 IRON BINDING TEST: CPT | Performed by: INTERNAL MEDICINE

## 2023-08-16 PROCEDURE — 84443 ASSAY THYROID STIM HORMONE: CPT

## 2023-08-17 NOTE — PROGRESS NOTES
CBC Normal (white blood cells and platelets), Slight anemia. Awaiting some more blood testing. B12 level is okay. CMP Normal (electrolytes, and liver functions), worsening of kidney function from prior. No motrin, ibuprofen, advil, alleve, naprosyn  with these medications. Concern with being on metformin with a decline in creatinine. We usually will stop metformin when he goes below 30 EGFR. He is close to that range. Would recommend following up with nephrology or kidney doctor. Thyroid is good. Vitamin D is okay. Continue the same.   Sed rate which is nonspecific marker of inflammation is normal.

## 2023-08-24 ENCOUNTER — TELEPHONE (OUTPATIENT)
Dept: INTERNAL MEDICINE CLINIC | Facility: CLINIC | Age: 83
End: 2023-08-24

## 2023-08-24 RX ORDER — GLIMEPIRIDE 1 MG/1
1 TABLET ORAL 2 TIMES DAILY
Qty: 180 TABLET | Refills: 1 | Status: SHIPPED | OUTPATIENT
Start: 2023-08-24

## 2023-08-24 RX ORDER — GLIMEPIRIDE 1 MG/1
1 TABLET ORAL 2 TIMES DAILY
Qty: 180 TABLET | Refills: 1 | Status: SHIPPED | OUTPATIENT
Start: 2023-08-24 | End: 2023-08-24

## 2023-08-24 NOTE — TELEPHONE ENCOUNTER
Action Requested: Summary for Provider     []  Critical Lab, Recommendations Needed  [x] Need Additional Advice  []   FYI    []   Need Orders  [x] Need Medications Sent to Pharmacy  []  Other     SUMMARY: Patient stated 100 % better, no longer has symptoms. Already resume Glimepiride for the last 4 days with blood sugar reads of 119,122,121,121. Stated not happy with care with Endo and asking that we send this message to Dr. Mark Man.     Pend for review/approval    Reason for call: Medication Question (Glimepiride)  Onset: Data Unavailable

## 2023-08-24 NOTE — TELEPHONE ENCOUNTER
Patient requesting script rx Glimepiride to be filled, patient has only 1 and half weeks worth of medication. Patient stopped taking rx Tradjenta prescribed by endo due to side effects. Patient declined scheduled follow up appointment. Please update at [phone],ULCABR.   *[facility]

## 2023-08-24 NOTE — TELEPHONE ENCOUNTER
Spoke with pt. Was weak and fatigue. Not able to finish [employer] beef sandwich. Stopped tajenta and after 3 days back to normal. Off glyburide and 200's. Restarted and sugars are back to normal. Likes ice cream every other day. Has enough meds. Fo rnow.

## 2023-08-28 ENCOUNTER — APPOINTMENT (OUTPATIENT)
Dept: URBAN - METROPOLITAN AREA CLINIC 244 | Age: 83
Setting detail: DERMATOLOGY
End: 2023-08-31

## 2023-08-28 PROBLEM — C44.629 SQUAMOUS CELL CARCINOMA OF SKIN OF LEFT UPPER LIMB, INCLUDING SHOULDER: Status: ACTIVE | Noted: 2023-08-28

## 2023-08-28 PROCEDURE — 13121 CMPLX RPR S/A/L 2.6-7.5 CM: CPT

## 2023-08-28 PROCEDURE — OTHER EXCISION: OTHER

## 2023-08-28 PROCEDURE — 11603 EXC TR-EXT MAL+MARG 2.1-3 CM: CPT

## 2023-08-28 NOTE — PROCEDURE: EXCISION
V-Y Flap Text: The defect edges were debeveled with a #15 scalpel blade. Given the location of the defect, shape of the defect and the proximity to free margins a V-Y flap was deemed most appropriate. Using a sterile surgical marker, an appropriate advancement flap was drawn incorporating the defect and placing the expected incisions within the relaxed skin tension lines where possible. The area thus outlined was incised deep to adipose tissue with a #15 scalpel blade. The skin margins were undermined to an appropriate distance in all directions utilizing iris scissors. Following this, the designed flap was advanced and carried over into the primary defect and sutured into place. N/A

## 2023-08-31 RX ORDER — LISINOPRIL 5 MG/1
5 TABLET ORAL DAILY
Qty: 90 TABLET | Refills: 1 | Status: SHIPPED | OUTPATIENT
Start: 2023-08-31

## 2023-09-07 PROBLEM — D04.9 SQUAMOUS CELL CARCINOMA IN SITU (SCCIS) OF SKIN: Status: ACTIVE | Noted: 2023-09-07

## 2023-09-11 ENCOUNTER — APPOINTMENT (OUTPATIENT)
Dept: URBAN - METROPOLITAN AREA CLINIC 244 | Age: 83
Setting detail: DERMATOLOGY
End: 2023-09-11

## 2023-09-11 DIAGNOSIS — Z48.02 ENCOUNTER FOR REMOVAL OF SUTURES: ICD-10-CM

## 2023-09-11 PROCEDURE — 99024 POSTOP FOLLOW-UP VISIT: CPT

## 2023-09-11 PROCEDURE — OTHER SUTURE REMOVAL (GLOBAL PERIOD): OTHER

## 2023-09-11 ASSESSMENT — LOCATION ZONE DERM: LOCATION ZONE: ARM

## 2023-09-11 ASSESSMENT — LOCATION DETAILED DESCRIPTION DERM: LOCATION DETAILED: LEFT PROXIMAL DORSAL FOREARM

## 2023-09-11 ASSESSMENT — LOCATION SIMPLE DESCRIPTION DERM: LOCATION SIMPLE: LEFT FOREARM

## 2023-09-11 NOTE — PROCEDURE: SUTURE REMOVAL (GLOBAL PERIOD)
Detail Level: Detailed
Add 17783 Cpt? (Important Note: In 2017 The Use Of 05532 Is Being Tracked By Cms To Determine Future Global Period Reimbursement For Global Periods): yes

## 2023-09-13 ENCOUNTER — NURSE TRIAGE (OUTPATIENT)
Dept: INTERNAL MEDICINE CLINIC | Facility: CLINIC | Age: 83
End: 2023-09-13

## 2023-09-14 ENCOUNTER — NURSE TRIAGE (OUTPATIENT)
Dept: INTERNAL MEDICINE CLINIC | Facility: CLINIC | Age: 83
End: 2023-09-14

## 2023-09-14 ENCOUNTER — TELEPHONE (OUTPATIENT)
Dept: INTERNAL MEDICINE CLINIC | Facility: CLINIC | Age: 83
End: 2023-09-14

## 2023-09-14 ENCOUNTER — TELEMEDICINE (OUTPATIENT)
Dept: INTERNAL MEDICINE CLINIC | Facility: CLINIC | Age: 83
End: 2023-09-14

## 2023-09-14 DIAGNOSIS — E11.9 TYPE 2 DIABETES MELLITUS WITHOUT COMPLICATION, WITHOUT LONG-TERM CURRENT USE OF INSULIN (HCC): ICD-10-CM

## 2023-09-14 DIAGNOSIS — N18.32 STAGE 3B CHRONIC KIDNEY DISEASE (HCC): Primary | ICD-10-CM

## 2023-09-14 DIAGNOSIS — D80.1 HYPOGAMMAGLOBULINEMIA (HCC): ICD-10-CM

## 2023-09-14 DIAGNOSIS — U07.1 COVID-19 VIREMIA: ICD-10-CM

## 2023-09-14 LAB — AMB EXT COVID-19 RESULT: DETECTED

## 2023-09-14 PROCEDURE — 1160F RVW MEDS BY RX/DR IN RCRD: CPT | Performed by: INTERNAL MEDICINE

## 2023-09-14 PROCEDURE — 1159F MED LIST DOCD IN RCRD: CPT | Performed by: INTERNAL MEDICINE

## 2023-09-14 PROCEDURE — 99214 OFFICE O/P EST MOD 30 MIN: CPT | Performed by: INTERNAL MEDICINE

## 2023-09-26 ENCOUNTER — TELEPHONE (OUTPATIENT)
Facility: CLINIC | Age: 83
End: 2023-09-26

## 2023-09-26 DIAGNOSIS — J02.9 SORE THROAT: Primary | ICD-10-CM

## 2023-09-26 NOTE — TELEPHONE ENCOUNTER
Can do over-the-counter Mucinex 1 tablet twice a day. May be residual from the [place] or could be reactivation of the COVID and some people very rare that can happen with any 2 doses of the Paxlovid. Can repeat respiratory panel testing if want can put order in. Or another type of viral infection?

## 2023-09-26 NOTE — TELEPHONE ENCOUNTER
Pt called -stated he took Paxlovid for COVID, 2-3 days after s/s raspy voice, sore throat-taking [employer] brand lozenges -helps little-advised saline gargle  ,burning/itchy behind eyes, not much coughing ,No SOB,  staying hydrated s/s for about 10 days    Asking what can be taken for these s/s

## 2023-10-03 RX ORDER — GLUCOSAM/CHON-MSM1/C/MANG/BOSW 500-416.6
TABLET ORAL
Qty: 100 EACH | Refills: 11 | Status: SHIPPED | OUTPATIENT
Start: 2023-10-03

## 2023-10-03 RX ORDER — ROSUVASTATIN CALCIUM 20 MG/1
20 TABLET, COATED ORAL NIGHTLY
Qty: 90 TABLET | Refills: 3 | Status: SHIPPED | OUTPATIENT
Start: 2023-10-03

## 2023-10-03 NOTE — TELEPHONE ENCOUNTER
Refill passed per [place] [facility] protocol. Medication is listed as patient reported. Requested Prescriptions   Pending Prescriptions Disp Refills    TRUEplus Lancets 33G Does not apply Misc [Pharmacy Med Name: Brittney Lopez 33G] 100 each 0     Sig: TEST BLOOD SUGAR ONE TIME DAILY IN THE MORNING       Diabetic Supplies Protocol Passed - 10/2/2023 10:23 AM        Passed - In person appointment or virtual visit in the past 12 mos or appointment in next 3 mos     Recent Outpatient Visits              2 weeks ago Stage 3b chronic kidney disease ([address]    [address], [address], Agusto Argueta MD    Telemedicine    3 months ago Uncontrolled type 2 diabetes mellitus with hyperglycemia [facility])    [address], [address] [facility], [facility], Paul Bence, APRN    Office Visit    5 months ago Stage 3b chronic kidney disease [facility])    [address], [address], Kia Milan MD    Office Visit    9 months ago Stage 3b chronic kidney disease [facility])    [address] [facility], Kia Milan MD    Office Visit    1 year ago Abnormal TSH    [address], [address], Kia Milan MD    Office Visit          Future Appointments         Provider Department Appt Notes    In 2 months Vijay Herrera MD [facility], [address] [facility] px                      Signed Prescriptions Disp Refills    rosuvastatin 20 MG Oral Tab 90 tablet 3     Sig: Take 1 tablet (20 mg total) by mouth nightly.        Cholesterol Medication Protocol Passed - 10/2/2023 10:23 AM        Passed - ALT in past 12 months        Passed - LDL in past 12 months        Passed - Last ALT < 80     Lab Results   Component Value Date    ALT 42 08/16/2023             Passed - Last LDL < 130     Lab Results   Component Value Date    LDL 32 06/27/2023             Passed - In person appointment or virtual visit in the past 12 mos or appointment in next 3 mos     Recent Outpatient Visits              2 weeks ago Stage 3b chronic kidney disease ([address]    [facility], [address], Agusto Argueta MD    Telemedicine    3 months ago Uncontrolled type 2 diabetes mellitus with hyperglycemia [facility])    [facility], [address] [facility], [place] [employer], Alexa Torres, APRN    Office Visit    5 months ago Stage 3b chronic kidney disease [facility])    [address], [address], Sherell Lesches., MD    Office Visit    9 months ago Stage 3b chronic kidney disease [facility])    [place], Sherell Lesches., MD    Office Visit    1 year ago Abnormal TSH    [address], [address], Sherell Lesches., MD    Office Visit          Future Appointments         Provider Department Appt Notes    In 2 months MD Justin Luz [facility]                        Recent Outpatient Visits              2 weeks ago Stage 3b chronic kidney disease [facility])    [address], [address], Sherell Lesches., MD    Telemedicine    3 months ago Uncontrolled type 2 diabetes mellitus with hyperglycemia [facility])    [facility], [address] [facility], [place] [employer], Alexa Torres, APRN    Office Visit    5 months ago Stage 3b chronic kidney disease [facility])    [address], [address], Sherell Lesches., MD    Office Visit    9 months ago Stage 3b chronic kidney disease [facility])    [place], Sherell Lesches., MD    Office Visit    1 year ago Abnormal TSH    [address], [address], Sherell Lesches., MD    Office Visit          Future Appointments         Provider Department Appt Notes    In 2 months Massimo Parra MD [facility], [address] [place] [address],[address], Charisse goncalves

## 2023-10-03 NOTE — TELEPHONE ENCOUNTER
Refill passed per [facility] protocol.   Requested Prescriptions   Pending Prescriptions Disp Refills    TRUEPLUS LANCETS 33G Does not apply Misc [Pharmacy Med Name: Flaquita Gay 33G] 100 each 0     Sig: TEST BLOOD SUGAR ONE TIME DAILY IN THE MORNING       Diabetic Supplies Protocol Passed - 10/2/2023 10:23 AM        Passed - In person appointment or virtual visit in the past 12 mos or appointment in next 3 mos     Recent Outpatient Visits              2 weeks ago Stage 3b chronic kidney disease ([address]    [address], [address], Mini Argueta MD    Telemedicine    3 months ago Uncontrolled type 2 diabetes mellitus with hyperglycemia [facility])    [address], [address] [facility], [facility], APRN    Office Visit    5 months ago Stage 3b chronic kidney disease [facility])    [address], [address], Denise Cano MD    Office Visit    9 months ago Stage 3b chronic kidney disease [facility])    [address], [address], Denise Cano MD    Office Visit    1 year ago Abnormal TSH    [address], [address], Denise Cano MD    Office Visit          Future Appointments         Provider Department Appt Notes    In 2 months [facility] [address], [facility] px                       ROSUVASTATIN 20 MG Oral Tab [Pharmacy Med Name: ROSUVASTATIN CALCIUM 20 MG Tablet] 90 tablet 1     Sig: TAKE 1 TABLET EVERY NIGHT       Cholesterol Medication Protocol Passed - 10/2/2023 10:23 AM        Passed - ALT in past 12 months        Passed - LDL in past 12 months        Passed - Last ALT < 80     Lab Results   Component Value Date    ALT 42 08/16/2023             Passed - Last LDL < 130     Lab Results   Component Value Date    LDL 32 06/27/2023             Passed - In person appointment or virtual visit in the past 12 mos or appointment in next 3 mos     Recent Outpatient Visits              2 weeks ago Stage 3b chronic kidney disease ([address]    [facility], [address], Mini Argueta MD    Telemedicine    3 months ago Uncontrolled type 2 diabetes mellitus with hyperglycemia [facility])    [facility], [address] [facility], [place] [employer], Glorya Jessica, APRN    Office Visit    5 months ago Stage 3b chronic kidney disease [facility])    [address], [address], Denise Cano MD    Office Visit    9 months ago Stage 3b chronic kidney disease [facility])    [address] [facility], Denise Cano MD    Office Visit    1 year ago Abnormal TSH    [address], [address], Denise Cano MD    Office Visit          Future Appointments         Provider Department Appt Notes    In 2 months Purnima Roger MD [address] [facility]                        Recent Outpatient Visits              2 weeks ago Stage 3b chronic kidney disease [facility])    [address], [address], Denise Cano MD    Telemedicine    3 months ago Uncontrolled type 2 diabetes mellitus with hyperglycemia [facility])    [facility], [address] [facility], [place] [employer], Glorya Jessica, APRN    Office Visit    5 months ago Stage 3b chronic kidney disease [facility])    [address], [address], Denise Cano MD    Office Visit    9 months ago Stage 3b chronic kidney disease [facility])    [address] [facility], Denise Cano MD    Office Visit    1 year ago Abnormal TSH    [address], [address], Denise Cano MD    Office Visit          Future Appointments         Provider Department Appt Notes    In 2 months Purnima Roger MD [address], [address], Charisse nava px

## 2023-10-18 NOTE — TELEPHONE ENCOUNTER
Pt will like to get a flu shot on Sept 23 states wife have a appt at the coumadin clinic on that day at 1:30      Please advise no

## 2023-11-22 ENCOUNTER — TELEPHONE (OUTPATIENT)
Dept: INTERNAL MEDICINE CLINIC | Facility: CLINIC | Age: 83
End: 2023-11-22

## 2023-12-11 ENCOUNTER — APPOINTMENT (OUTPATIENT)
Dept: URBAN - METROPOLITAN AREA CLINIC 244 | Age: 83
Setting detail: DERMATOLOGY
End: 2023-12-12

## 2023-12-11 DIAGNOSIS — L82.1 OTHER SEBORRHEIC KERATOSIS: ICD-10-CM

## 2023-12-11 DIAGNOSIS — L57.0 ACTINIC KERATOSIS: ICD-10-CM

## 2023-12-11 DIAGNOSIS — D22 MELANOCYTIC NEVI: ICD-10-CM

## 2023-12-11 DIAGNOSIS — L81.4 OTHER MELANIN HYPERPIGMENTATION: ICD-10-CM

## 2023-12-11 PROBLEM — D22.39 MELANOCYTIC NEVI OF OTHER PARTS OF FACE: Status: ACTIVE | Noted: 2023-12-11

## 2023-12-11 PROBLEM — D22.61 MELANOCYTIC NEVI OF RIGHT UPPER LIMB, INCLUDING SHOULDER: Status: ACTIVE | Noted: 2023-12-11

## 2023-12-11 PROBLEM — D22.62 MELANOCYTIC NEVI OF LEFT UPPER LIMB, INCLUDING SHOULDER: Status: ACTIVE | Noted: 2023-12-11

## 2023-12-11 PROBLEM — D22.5 MELANOCYTIC NEVI OF TRUNK: Status: ACTIVE | Noted: 2023-12-11

## 2023-12-11 PROCEDURE — 17004 DESTROY PREMAL LESIONS 15/>: CPT

## 2023-12-11 PROCEDURE — OTHER LIQUID NITROGEN: OTHER

## 2023-12-11 PROCEDURE — OTHER COUNSELING: OTHER

## 2023-12-11 PROCEDURE — 99213 OFFICE O/P EST LOW 20 MIN: CPT | Mod: 25

## 2023-12-11 ASSESSMENT — LOCATION DETAILED DESCRIPTION DERM
LOCATION DETAILED: INFERIOR THORACIC SPINE
LOCATION DETAILED: GLABELLA
LOCATION DETAILED: LEFT DISTAL POSTERIOR UPPER ARM
LOCATION DETAILED: RIGHT ELBOW
LOCATION DETAILED: LEFT INFERIOR CENTRAL MALAR CHEEK
LOCATION DETAILED: RIGHT INFERIOR CENTRAL MALAR CHEEK
LOCATION DETAILED: LEFT DISTAL DORSAL FOREARM
LOCATION DETAILED: MEDIAL FRONTAL SCALP
LOCATION DETAILED: LEFT SUPERIOR HELIX
LOCATION DETAILED: RIGHT DISTAL POSTERIOR UPPER ARM
LOCATION DETAILED: LEFT ELBOW
LOCATION DETAILED: RIGHT PROXIMAL DORSAL FOREARM
LOCATION DETAILED: LEFT PROXIMAL DORSAL FOREARM
LOCATION DETAILED: SUPERIOR THORACIC SPINE

## 2023-12-11 ASSESSMENT — LOCATION SIMPLE DESCRIPTION DERM
LOCATION SIMPLE: LEFT FOREARM
LOCATION SIMPLE: LEFT CHEEK
LOCATION SIMPLE: RIGHT CHEEK
LOCATION SIMPLE: LEFT ELBOW
LOCATION SIMPLE: RIGHT POSTERIOR UPPER ARM
LOCATION SIMPLE: LEFT EAR
LOCATION SIMPLE: RIGHT FOREARM
LOCATION SIMPLE: FRONTAL SCALP
LOCATION SIMPLE: GLABELLA
LOCATION SIMPLE: LEFT POSTERIOR UPPER ARM
LOCATION SIMPLE: RIGHT ELBOW
LOCATION SIMPLE: UPPER BACK

## 2023-12-11 ASSESSMENT — LOCATION ZONE DERM
LOCATION ZONE: FACE
LOCATION ZONE: TRUNK
LOCATION ZONE: SCALP
LOCATION ZONE: EAR
LOCATION ZONE: ARM

## 2023-12-11 NOTE — PROCEDURE: LIQUID NITROGEN
Render Post-Care Instructions In Note?: no
Total Number Of Aks Treated: 24
Duration Of Freeze Thaw-Cycle (Seconds): 0
Consent: The patient's consent was obtained including but not limited to risks of crusting, scabbing, blistering, scarring, darker or lighter pigmentary change, recurrence, incomplete removal and infection.
Post-Care Instructions: I reviewed with the patient in detail post-care instructions. Patient is to wear sunprotection, and avoid picking at any of the treated lesions. Pt may apply Vaseline to crusted or scabbing areas.
Detail Level: Zone

## 2023-12-13 PROBLEM — Z86.0101 HISTORY OF ADENOMATOUS POLYP OF COLON: Status: RESOLVED | Noted: 2020-01-06 | Resolved: 2023-12-13

## 2023-12-13 PROBLEM — Z86.010 HISTORY OF ADENOMATOUS POLYP OF COLON: Status: RESOLVED | Noted: 2020-01-06 | Resolved: 2023-12-13

## 2023-12-14 ENCOUNTER — OFFICE VISIT (OUTPATIENT)
Dept: INTERNAL MEDICINE CLINIC | Facility: CLINIC | Age: 83
End: 2023-12-14
Payer: MEDICARE

## 2023-12-14 VITALS
SYSTOLIC BLOOD PRESSURE: 117 MMHG | BODY MASS INDEX: 27.98 KG/M2 | DIASTOLIC BLOOD PRESSURE: 71 MMHG | HEART RATE: 76 BPM | HEIGHT: 68 IN | OXYGEN SATURATION: 97 % | WEIGHT: 184.63 LBS | TEMPERATURE: 97 F

## 2023-12-14 DIAGNOSIS — Z91.013 ALLERGY TO SEAFOOD: ICD-10-CM

## 2023-12-14 DIAGNOSIS — G89.29 CHRONIC PAIN OF RIGHT KNEE: ICD-10-CM

## 2023-12-14 DIAGNOSIS — I10 ESSENTIAL HYPERTENSION: ICD-10-CM

## 2023-12-14 DIAGNOSIS — N18.31 STAGE 3A CHRONIC KIDNEY DISEASE (HCC): Chronic | ICD-10-CM

## 2023-12-14 DIAGNOSIS — M48.062 LUMBAR STENOSIS WITH NEUROGENIC CLAUDICATION: ICD-10-CM

## 2023-12-14 DIAGNOSIS — E11.9 TYPE 2 DIABETES MELLITUS WITHOUT COMPLICATION, WITHOUT LONG-TERM CURRENT USE OF INSULIN (HCC): ICD-10-CM

## 2023-12-14 DIAGNOSIS — H04.123 DRY EYE SYNDROME OF BILATERAL LACRIMAL GLANDS: ICD-10-CM

## 2023-12-14 DIAGNOSIS — R91.1 PULMONARY NODULE: ICD-10-CM

## 2023-12-14 DIAGNOSIS — E83.52 HYPERCALCEMIA: ICD-10-CM

## 2023-12-14 DIAGNOSIS — Z85.46 PERSONAL HISTORY OF PROSTATE CANCER: ICD-10-CM

## 2023-12-14 DIAGNOSIS — R76.11 POSITIVE PPD: ICD-10-CM

## 2023-12-14 DIAGNOSIS — H25.811 COMBINED FORMS OF AGE-RELATED CATARACT OF RIGHT EYE: ICD-10-CM

## 2023-12-14 DIAGNOSIS — Z00.00 ENCOUNTER FOR ANNUAL HEALTH EXAMINATION: ICD-10-CM

## 2023-12-14 DIAGNOSIS — R93.41 ABNORMAL COMPUTED TOMOGRAPHY OF URETER: Primary | ICD-10-CM

## 2023-12-14 DIAGNOSIS — D04.9 SQUAMOUS CELL CARCINOMA IN SITU (SCCIS) OF SKIN: ICD-10-CM

## 2023-12-14 DIAGNOSIS — E78.2 MIXED HYPERLIPIDEMIA: ICD-10-CM

## 2023-12-14 DIAGNOSIS — E87.5 HYPERKALEMIA: ICD-10-CM

## 2023-12-14 DIAGNOSIS — N13.5 URETERAL STRICTURE: ICD-10-CM

## 2023-12-14 DIAGNOSIS — R79.89 ELEVATED PTHRP LEVEL: ICD-10-CM

## 2023-12-14 DIAGNOSIS — R79.89 ABNORMAL TSH: ICD-10-CM

## 2023-12-14 DIAGNOSIS — M25.561 CHRONIC PAIN OF RIGHT KNEE: ICD-10-CM

## 2023-12-14 DIAGNOSIS — H40.009 BORDERLINE GLAUCOMA, UNSPECIFIED LATERALITY: ICD-10-CM

## 2023-12-14 DIAGNOSIS — Z80.0 FAMILY HISTORY OF COLON CANCER: ICD-10-CM

## 2023-12-14 DIAGNOSIS — D73.5 SPLENIC INFARCT: ICD-10-CM

## 2023-12-14 DIAGNOSIS — D50.8 OTHER IRON DEFICIENCY ANEMIA: ICD-10-CM

## 2023-12-14 DIAGNOSIS — E83.42 HYPOMAGNESEMIA: ICD-10-CM

## 2023-12-14 DIAGNOSIS — D80.1 HYPOGAMMAGLOBULINEMIA (HCC): ICD-10-CM

## 2023-12-14 DIAGNOSIS — Z71.85 VACCINE COUNSELING: ICD-10-CM

## 2023-12-14 LAB
APPEARANCE: CLEAR
BASOPHILS # BLD AUTO: 0.07 X10(3) UL (ref 0–0.2)
BASOPHILS NFR BLD AUTO: 1 %
BILIRUBIN: NEGATIVE
CREAT UR-SCNC: 112.6 MG/DL
DEPRECATED HBV CORE AB SER IA-ACNC: 15.1 NG/ML
DEPRECATED RDW RBC AUTO: 50.9 FL (ref 35.1–46.3)
EOSINOPHIL # BLD AUTO: 0.84 X10(3) UL (ref 0–0.7)
EOSINOPHIL NFR BLD AUTO: 11.9 %
ERYTHROCYTE [DISTWIDTH] IN BLOOD BY AUTOMATED COUNT: 14.3 % (ref 11–15)
GLUCOSE (URINE DIPSTICK): NEGATIVE MG/DL
HCT VFR BLD AUTO: 41.7 %
HGB BLD-MCNC: 13.6 G/DL
IMM GRANULOCYTES # BLD AUTO: 0.01 X10(3) UL (ref 0–1)
IMM GRANULOCYTES NFR BLD: 0.1 %
IRON SATN MFR SERPL: 14 %
IRON SERPL-MCNC: 58 UG/DL
LEUKOCYTES: NEGATIVE
LYMPHOCYTES # BLD AUTO: 1.52 X10(3) UL (ref 1–4)
LYMPHOCYTES NFR BLD AUTO: 21.5 %
MAGNESIUM SERPL-MCNC: 2 MG/DL (ref 1.6–2.6)
MCH RBC QN AUTO: 31.5 PG (ref 26–34)
MCHC RBC AUTO-ENTMCNC: 32.6 G/DL (ref 31–37)
MCV RBC AUTO: 96.5 FL
MICROALBUMIN UR-MCNC: 0.4 MG/DL
MICROALBUMIN/CREAT 24H UR-RTO: 3.6 UG/MG (ref ?–30)
MONOCYTES # BLD AUTO: 0.65 X10(3) UL (ref 0.1–1)
MONOCYTES NFR BLD AUTO: 9.2 %
MULTISTIX LOT#: NORMAL NUMERIC
NEUTROPHILS # BLD AUTO: 3.98 X10 (3) UL (ref 1.5–7.7)
NEUTROPHILS # BLD AUTO: 3.98 X10(3) UL (ref 1.5–7.7)
NEUTROPHILS NFR BLD AUTO: 56.3 %
NITRITE, URINE: NEGATIVE
OCCULT BLOOD: NEGATIVE
PH, URINE: 5.5 (ref 4.5–8)
PLATELET # BLD AUTO: 296 10(3)UL (ref 150–450)
PROTEIN (URINE DIPSTICK): NEGATIVE MG/DL
RBC # BLD AUTO: 4.32 X10(6)UL
SPECIFIC GRAVITY: 1.02 (ref 1–1.03)
TIBC SERPL-MCNC: 429 UG/DL (ref 250–425)
TRANSFERRIN SERPL-MCNC: 288 MG/DL (ref 215–365)
URINE-COLOR: YELLOW
UROBILINOGEN,SEMI-QN: 0.2 MG/DL (ref 0–1.9)
WBC # BLD AUTO: 7.1 X10(3) UL (ref 4–11)

## 2023-12-14 PROCEDURE — 36415 COLL VENOUS BLD VENIPUNCTURE: CPT | Performed by: INTERNAL MEDICINE

## 2023-12-15 LAB
EST. AVERAGE GLUCOSE BLD GHB EST-MCNC: 171 MG/DL (ref 68–126)
HBA1C MFR BLD: 7.6 % (ref ?–5.7)

## 2023-12-17 NOTE — PROGRESS NOTES
CBC Normal (white blood cells and red blood cells and platelets), iron storage and iron levels are still low. Can take iron 65 once a day with vitamin C 500 IUs once a day with folic acid 1 mg a day. Recheck iron levels in 3 months. Would also recommend following up with GI to look for other sources of why not improving with anemia. Magnesium level is normal.  Urine shows no protein spillage from the effect of diabetes  Hemoglobin A1C which is 3 month average of sugars is same. Still elevated. Goal 6.5 or less. Careful with diet and excercise at least 30 minutes 3-4 times a week. Check sugars at different times on different dates. Careful with low sugars. Carry something with you and check sugar if can. Can carry ye cracker, etc. Decrease carbohydrates. But also, careful with fruits and natural sugars. One serving a day and no more than 1 handful every day. Check feet  every AM and careful with sores and ulcers on feet bilaterally. Check eyes every year with dilated eye exam.  Check sugars. 2-hour postmeal should be less than 140s. Pre-meal should be 's. Both equally affected A1c. Will increase glimepiride to 1 tablet 3 times a day with each food or prior to food to 50 minutes before. Call if sugars in 1 week. Recheck A1c in 3 months. Orders written.

## 2023-12-18 ENCOUNTER — TELEPHONE (OUTPATIENT)
Dept: INTERNAL MEDICINE CLINIC | Facility: CLINIC | Age: 83
End: 2023-12-18

## 2023-12-18 ENCOUNTER — TELEPHONE (OUTPATIENT)
Facility: CLINIC | Age: 83
End: 2023-12-18

## 2023-12-18 DIAGNOSIS — M25.559 HIP PAIN, UNSPECIFIED LATERALITY: Primary | ICD-10-CM

## 2023-12-18 RX ORDER — GLIMEPIRIDE 1 MG/1
1 TABLET ORAL 3 TIMES DAILY
Qty: 270 TABLET | Refills: 1 | Status: SHIPPED | OUTPATIENT
Start: 2023-12-18

## 2023-12-18 NOTE — TELEPHONE ENCOUNTER
Confirmed receipt of Rx:      Outpatient Medication Detail     Disp Refills Start End    glimepiride 1 MG Oral Tab 270 tablet 1 12/18/2023 --    Sig - Route:  Take 1 tablet (1 mg total) by mouth 3 (three) times daily. - Oral    Sent to pharmacy as: Glimepiride 1 MG Oral Tablet (Amaryl)    Notes to Pharmacy: Dx E11.9    E-Prescribing Status: Receipt confirmed by pharmacy (12/18/2023  2:43 PM CST)      Pharmacy    [address] [place] [address] [phone], [phone]

## 2023-12-18 NOTE — TELEPHONE ENCOUNTER
Patient called and said he forgot to mention to [address] [place] [address] [address] the other day he saw her, that he has been having pain on the right side of his hip and when he walks he gets a shooting pain. Patient said he felt like it would go away but didn't, Patient is looking to get a referral for . Patient is requesting referral.     Name of specialist and specialty department : Sukumar Mcneal  Reason for visit with the specialist: Hip pain and shooting pain   Address of the specialist office: n/a  Appointment date: n/a          CSS informed patient the turnaround time for referral is 5-7 business days. Patient was informed to check their My [employer] account for referral status. 18 year old female s/p Lap Appy on 2/3/20 presenting with perforated stump appendicitis now s/p ex-lap, excision of appendiceal stump and washout POD #1    -AICHA  -Continue IV antibiotics   -CLD, ADAT  -D/C buchanan AM  -TOV  -Pain control  -DVT ppx

## 2023-12-18 NOTE — TELEPHONE ENCOUNTER
Per result note, patient needs new Rx for Glimepiride. PCP wrote the Rx but did not send to pharmacy per chart review. Lab result note from 12/14/23  Check eyes every year with dilated eye exam.  Check sugars. 2-hour postmeal should be less than 140s. Pre-meal should be 's. Both equally affected A1c. Will increase glimepiride to 1 tablet 3 times a day with each food or prior to food to 50 minutes before. Call if sugars in 1 week. Recheck A1c in 3 months. Orders written.

## 2023-12-18 NOTE — TELEPHONE ENCOUNTER
Dr Macarena Lopez,     Patient is requesting a referral to Dr. Kalani Flores for hip pain. Pended referral please review diagnosis and sign off if you agree. Thank you.   Franklin Helm

## 2023-12-21 ENCOUNTER — TELEPHONE (OUTPATIENT)
Dept: INTERNAL MEDICINE CLINIC | Facility: CLINIC | Age: 83
End: 2023-12-21

## 2023-12-21 RX ORDER — GLIMEPIRIDE 1 MG/1
1 TABLET ORAL 3 TIMES DAILY
Qty: 270 TABLET | Refills: 1 | Status: SHIPPED | OUTPATIENT
Start: 2023-12-21

## 2024-01-02 RX ORDER — LEVOTHYROXINE SODIUM 0.05 MG/1
50 TABLET ORAL
Qty: 90 TABLET | Refills: 3 | Status: SHIPPED | OUTPATIENT
Start: 2024-01-02

## 2024-01-02 NOTE — TELEPHONE ENCOUNTER
Refill passed per [facility] protocol.  Requested Prescriptions   Pending Prescriptions Disp Refills    LEVOTHYROXINE 50 MCG Oral Tab [Pharmacy Med Name: LEVOTHYROXINE SODIUM 50 MCG Tablet] 90 tablet 3     Sig: TAKE 1 TABLET BEFORE BREAKFAST.       Thyroid Medication Protocol Passed - 1/1/2024  1:03 PM        Passed - TSH in past 12 months        Passed - Last TSH value is normal     Lab Results   Component Value Date    TSH 2.040 08/16/2023    THYROIDFUNC 4.78 05/31/2016                 Passed - In person appointment or virtual visit in the past 12 mos or appointment in next 3 mos     Recent Outpatient Visits              2 weeks ago [facility]    Office Visit    3 months ago Stage 3b chronic kidney disease ([facility])    [facility] Janiya Roberts MD    Telemedicine    6 months ago Uncontrolled type 2 diabetes mellitus with hyperglycemia ([facility])    [facility] [place]    Office Visit    8 months ago Stage 3b chronic kidney disease ([facility])    [facility], Janiya Roberts MD    Office Visit    1 year ago Stage 3b chronic kidney disease ([facility]    [facility]          Future Appointments         Provider Department Appt Notes    In 5 months [facility]                  Recent Outpatient Visits              2 weeks ago Abnormal computed tomography of ureter    [facility] Janiya Roberts MD    Office Visit    3 months ago Stage 3b chronic kidney disease ([facility]    [facility]    6 months ago  Uncontrolled type 2 diabetes mellitus with hyperglycemia (H[facility])    [facility] [place] Marisel Ward APRN    Office Visit    8 months ago Stage 3b chronic kidney disease ([facility])    [facility] Janiya Griggs MD    Office Visit    1 year ago Stage 3b chronic kidney disease ([facility])    [facility] Janiya Griggs MD    Office Visit          Future Appointments         Provider Department Appt Notes    In 5 months Janiya Griggs MD [facility] 6 month fu

## 2024-01-17 RX ORDER — LISINOPRIL 5 MG/1
5 TABLET ORAL DAILY
Qty: 90 TABLET | Refills: 3 | Status: SHIPPED | OUTPATIENT
Start: 2024-01-17

## 2024-01-17 NOTE — TELEPHONE ENCOUNTER
Please review. Protocol Failed or has No Protocol.    Requested Prescriptions   Pending Prescriptions Disp Refills    LISINOPRIL 5 MG Oral Tab [Pharmacy Med Name: LISINOPRIL 5 MG Tablet] 90 tablet 3     Sig: TAKE 1 TABLET EVERY DAY       Hypertensive Medications Protocol Failed - 1/16/2024 10:52 AM        Failed - EGFRCR or GFRNAA > 50     GFR Evaluation  EGFRCR: 35 , resulted on 8/16/2023          Passed - In person appointment in the past 12 or next 3 months     Recent Outpatient Visits              1 month ago Abnormal computed tomography of ureter    [facility] Janiya Griggs MD    Office Visit    4 months ago Stage 3b chronic kidney disease ([facility])    [facility] Janiya Griggs MD    Telemedicine    7 months ago Uncontrolled type 2 diabetes mellitus with hyperglycemia ([facility])    [facility] [place]    Office Visit    8 months ago Stage 3b chronic kidney disease ([facility])    [facility] [place] [facility]    Office Visit    1 year ago Stage 3b chronic kidney disease ([facility])    [facility]    Office Visit          Future Appointments         Provider Department Appt Notes    In 4 months Janiya Griggs MD [facility] 6 month fu               Passed - Last BP reading less than 140/90     BP Readings from Last 1 Encounters:   12/14/23 117/71               Passed - CMP or BMP in past 6 months     Recent Results (from the past 4392 hour(s))   Comp Metabolic Panel (14)    Collection Time: 08/16/23 11:52 AM   Result Value Ref Range    Glucose 164 (H) 70 - 99 mg/dL    Sodium 136 136 - 145 mmol/L    Potassium 4.9 3.5 - 5.1 mmol/L    Chloride 106 98 - 112 mmol/L    CO2 25.0 21.0 - 32.0 mmol/L    Anion Gap 5 0 - 18 mmol/L    BUN 24  (H) 7 - 18 mg/dL    Creatinine 1.91 (H) 0.70 - 1.30 mg/dL    Calcium, Total 9.7 8.5 - 10.1 mg/dL    Calculated Osmolality 290 275 - 295 mOsm/kg    eGFR-Cr 35 (L) >=60 mL/min/1.73m2    AST 23 15 - 37 U/L    ALT 42 16 - 61 U/L    Alkaline Phosphatase 84 45 - 117 U/L    Bilirubin, Total 0.4 0.1 - 2.0 mg/dL    Total Protein 7.0 6.4 - 8.2 g/dL    Albumin 4.0 3.4 - 5.0 g/dL    Globulin  3.0 2.8 - 4.4 g/dL    A/G Ratio 1.3 1.0 - 2.0    Patient Fasting for CMP? No      *Note: Due to a large number of results and/or encounters for the requested time period, some results have not been displayed. A complete set of results can be found in Results Review.               Passed - In person appointment or virtual visit in the past 6 months     Recent Outpatient Visits              1 month ago Abnormal computed tomography of ureter    [facility] [place] Janiya Griggs MD    Office Visit    4 months ago Stage 3b chronic kidney disease ([facility])    [facility] [place] Janiya Griggs MD    Telemedicine    7 months ago Uncontrolled type 2 diabetes mellitus with hyperglycemia ([facility])    [facility] Marisel Ward APRN    Office Visit    8 months ago Stage 3b chronic kidney disease ([facility]    Office Visit    1 year ago Stage 3b chronic kidney disease ([facility])    [facility] [place] Janiya Griggs MD    Office Visit          Future Appointments         Provider Department Appt Notes    In 4 months Janiya Griggs MD [facility] [place] 6 month fu                    Future Appointments         Provider Department Appt Notes    In 4 months Janiya Griggs MD [facility] [place] 6 month fu            Recent Outpatient Visits              1 month  ago Abnormal computed tomography of ureter    [facility], Janiya Roberts MD    Office Visit    4 months ago Stage 3b chronic kidney disease (HCC)    [facility] Janiya Roberts MD    Telemedicine    7 months ago Uncontrolled type 2 diabetes mellitus with hyperglycemia ([facility])    [facility] [place]    Office Visit    8 months ago Stage 3b chronic kidney disease (HCC)    [facility], Janiya Roberts MD    Office Visit    1 year ago Stage 3b chronic kidney disease ([facility])    [facility], Janiya Roberts MD    Office Visit

## 2024-02-25 ENCOUNTER — HOSPITAL ENCOUNTER (EMERGENCY)
Facility: HOSPITAL | Age: 84
Discharge: HOME | End: 2024-02-25
Attending: EMERGENCY MEDICINE
Payer: MEDICARE

## 2024-02-25 ENCOUNTER — APPOINTMENT (OUTPATIENT)
Dept: CT IMAGING | Facility: HOSPITAL | Age: 84
End: 2024-02-25
Attending: EMERGENCY MEDICINE
Payer: MEDICARE

## 2024-02-25 VITALS
TEMPERATURE: 97 F | RESPIRATION RATE: 18 BRPM | OXYGEN SATURATION: 96 % | HEART RATE: 62 BPM | HEIGHT: 68 IN | BODY MASS INDEX: 26.37 KG/M2 | DIASTOLIC BLOOD PRESSURE: 93 MMHG | WEIGHT: 174 LBS | SYSTOLIC BLOOD PRESSURE: 135 MMHG

## 2024-02-25 DIAGNOSIS — S39.011A FLANK STRAIN, INITIAL ENCOUNTER: Primary | ICD-10-CM

## 2024-02-25 LAB
ALBUMIN SERPL-MCNC: 4.3 G/DL (ref 3.2–4.8)
ALBUMIN/GLOB SERPL: 1.7 {RATIO} (ref 1–2)
ALP LIVER SERPL-CCNC: 74 U/L
ALT SERPL-CCNC: 29 U/L
ANION GAP SERPL CALC-SCNC: 5 MMOL/L (ref 0–18)
AST SERPL-CCNC: 26 U/L (ref ?–34)
BASOPHILS # BLD AUTO: 0.04 X10(3) UL (ref 0–0.2)
BASOPHILS NFR BLD AUTO: 0.7 %
BILIRUB SERPL-MCNC: 0.2 MG/DL (ref 0.2–1.1)
BILIRUB UR QL: NEGATIVE
BUN BLD-MCNC: 19 MG/DL (ref 9–23)
BUN/CREAT SERPL: 14.6 (ref 10–20)
CALCIUM BLD-MCNC: 9.7 MG/DL (ref 8.7–10.4)
CHLORIDE SERPL-SCNC: 110 MMOL/L (ref 98–112)
CLARITY UR: CLEAR
CO2 SERPL-SCNC: 26 MMOL/L (ref 21–32)
CREAT BLD-MCNC: 1.3 MG/DL
DEPRECATED RDW RBC AUTO: 46.5 FL (ref 35.1–46.3)
EGFRCR SERPLBLD CKD-EPI 2021: 55 ML/MIN/1.73M2 (ref 60–?)
EOSINOPHIL # BLD AUTO: 0.44 X10(3) UL (ref 0–0.7)
EOSINOPHIL NFR BLD AUTO: 8.1 %
ERYTHROCYTE [DISTWIDTH] IN BLOOD BY AUTOMATED COUNT: 13.1 % (ref 11–15)
GLOBULIN PLAS-MCNC: 2.5 G/DL (ref 2.8–4.4)
GLUCOSE BLD-MCNC: 164 MG/DL (ref 70–99)
GLUCOSE UR-MCNC: NORMAL MG/DL
HCT VFR BLD AUTO: 38.8 %
HGB BLD-MCNC: 13.3 G/DL
HGB UR QL STRIP.AUTO: NEGATIVE
IMM GRANULOCYTES # BLD AUTO: 0.01 X10(3) UL (ref 0–1)
IMM GRANULOCYTES NFR BLD: 0.2 %
KETONES UR-MCNC: NEGATIVE MG/DL
LEUKOCYTE ESTERASE UR QL STRIP.AUTO: NEGATIVE
LYMPHOCYTES # BLD AUTO: 1.27 X10(3) UL (ref 1–4)
LYMPHOCYTES NFR BLD AUTO: 23.5 %
MCH RBC QN AUTO: 32.9 PG (ref 26–34)
MCHC RBC AUTO-ENTMCNC: 34.3 G/DL (ref 31–37)
MCV RBC AUTO: 96 FL
MONOCYTES # BLD AUTO: 0.44 X10(3) UL (ref 0.1–1)
MONOCYTES NFR BLD AUTO: 8.1 %
NEUTROPHILS # BLD AUTO: 3.2 X10 (3) UL (ref 1.5–7.7)
NEUTROPHILS # BLD AUTO: 3.2 X10(3) UL (ref 1.5–7.7)
NEUTROPHILS NFR BLD AUTO: 59.4 %
NITRITE UR QL STRIP.AUTO: NEGATIVE
OSMOLALITY SERPL CALC.SUM OF ELEC: 298 MOSM/KG (ref 275–295)
PH UR: 5 [PH] (ref 5–8)
PLATELET # BLD AUTO: 272 10(3)UL (ref 150–450)
POTASSIUM SERPL-SCNC: 4.7 MMOL/L (ref 3.5–5.1)
PROT SERPL-MCNC: 6.8 G/DL (ref 5.7–8.2)
PROT UR-MCNC: NEGATIVE MG/DL
RBC # BLD AUTO: 4.04 X10(6)UL
SODIUM SERPL-SCNC: 141 MMOL/L (ref 136–145)
SP GR UR STRIP: 1.01 (ref 1–1.03)
UROBILINOGEN UR STRIP-ACNC: NORMAL
WBC # BLD AUTO: 5.4 X10(3) UL (ref 4–11)

## 2024-02-25 PROCEDURE — 99285 EMERGENCY DEPT VISIT HI MDM: CPT

## 2024-02-25 PROCEDURE — 74176 CT ABD & PELVIS W/O CONTRAST: CPT | Performed by: EMERGENCY MEDICINE

## 2024-02-25 PROCEDURE — 96374 THER/PROPH/DIAG INJ IV PUSH: CPT

## 2024-02-25 PROCEDURE — 80053 COMPREHEN METABOLIC PANEL: CPT | Performed by: EMERGENCY MEDICINE

## 2024-02-25 PROCEDURE — 81003 URINALYSIS AUTO W/O SCOPE: CPT

## 2024-02-25 PROCEDURE — 81003 URINALYSIS AUTO W/O SCOPE: CPT | Performed by: EMERGENCY MEDICINE

## 2024-02-25 PROCEDURE — 85025 COMPLETE CBC W/AUTO DIFF WBC: CPT | Performed by: EMERGENCY MEDICINE

## 2024-02-25 PROCEDURE — 99284 EMERGENCY DEPT VISIT MOD MDM: CPT

## 2024-02-25 RX ORDER — KETOROLAC TROMETHAMINE 15 MG/ML
15 INJECTION, SOLUTION INTRAMUSCULAR; INTRAVENOUS ONCE
Status: COMPLETED | OUTPATIENT
Start: 2024-02-25 | End: 2024-02-25

## 2024-02-25 RX ORDER — KETOROLAC TROMETHAMINE 10 MG/1
10 TABLET, FILM COATED ORAL EVERY 6 HOURS PRN
Qty: 20 TABLET | Refills: 0 | Status: SHIPPED | OUTPATIENT
Start: 2024-02-25 | End: 2024-03-01

## 2024-02-26 NOTE — ED PROVIDER NOTES
Patient Seen in: [facility] Emergency Department    History     Chief Complaint   Patient presents with    Flank Pain       HPI    The patient presents to the ED complaining of severe right flank pain starting last night.  He states pain is negligible when holding still but is much worse with any movement.  Denies any radiation of the pain, vomiting or other complaints.  History of kidney stones and pain feels somewhat similar.    History reviewed.   Past Medical History:   Diagnosis Date    Cataracts, bilateral 12-7-15    Footdrop     right    History of blood transfusion     5 years ago    Hypogammaglobulinemia (HCC)     mild, IgG1 and IgM; no treatment    Inguinal hernia, right     Repair    Positive PPD 2003    CXR negative.     Prostate cancer (HCC)        History reviewed.   Past Surgical History:   Procedure Laterality Date    APPENDECTOMY      CATARACT EXTRACTION W/ INTRAOCULAR LENS IMPLANT Left 2020    [facility] CE LENSX/ORA MAC TORIC OS DR. SCRUGGS  [id number] +18.50    CHOLECYSTECTOMY      COLONOSCOPY  2013    osis    COLONOSCOPY  2020    COLONOSCOPY N/A 3/4/2020    Procedure: COLONOSCOPY, POSSIBLE BIOPSY, POSSIBLE POLYPECTOMY 10805;  Surgeon: Salvador Perkins MD;  Location: [facility] SURGICAL [facility]    EGD  2013    stricture and large HH w Alonso erosions    INGUINAL HERNIA REPAIR Right     OTHER SURGICAL HISTORY      radical prostatectomy         Medications :  (Not in a hospital admission)       Family History   Problem Relation Age of Onset    Heart Attack Father         CAD    Cancer Father         lung    Cancer Sister         colon    Other (Other) Mother         alzheimers       Smoking Status:   Social History     Socioeconomic History    Marital status:    Tobacco Use    Smoking status: Former     Packs/day: 1     Types: Cigarettes     Quit date: 1998     Years since quittin.1    Smokeless tobacco: Never   Vaping Use    Vaping Use: Never used    Substance and Sexual Activity    Alcohol use: Yes     Comment: rarely    Drug use: No   Other Topics Concern    Caffeine Concern Yes     Comment: coffee, 2 cups       Constitutional and vital signs reviewed.      Social History and Family History elements reviewed from today, pertinent positives to the presenting problem noted.    Physical Exam     ED Triage Vitals   BP 02/25/24 1952 134/86   Pulse 02/25/24 1951 76   Resp 02/25/24 1951 18   Temp 02/25/24 1951 97.3 °F (36.3 °C)   Temp src 02/25/24 1951 Oral   SpO2 02/25/24 1951 97 %   O2 Device 02/25/24 1951 None (Room air)       All measures to prevent infection transmission during my interaction with the patient were taken. The patient was already wearing a droplet mask on my arrival to the room. Personal protective equipment was worn throughout the duration of the exam.  Handwashing was performed prior to and after the exam.  Stethoscope and any equipment used during my examination was cleaned with super sani-cloth germicidal wipes following the exam.     Physical Exam  Vitals and nursing note reviewed.   Constitutional:       General: He is not in acute distress.     Appearance: He is well-developed.   HENT:      Head: Normocephalic and atraumatic.   Eyes:      General:         Right eye: No discharge.         Left eye: No discharge.      Conjunctiva/sclera: Conjunctivae normal.   Neck:      Trachea: No tracheal deviation.   Cardiovascular:      Rate and Rhythm: Normal rate.   Pulmonary:      Effort: Pulmonary effort is normal. No respiratory distress.      Breath sounds: No stridor.   Abdominal:      General: There is no distension.      Palpations: Abdomen is soft.      Tenderness: There is right CVA tenderness.      Comments: Severe tenderness to the right CVA area.  Patient wincing in pain with any palpation of the muscles in this area.   Musculoskeletal:         General: No tenderness or deformity.   Skin:     General: Skin is warm and dry.   Neurological:       Mental Status: He is alert and oriented to person, place, and time.   Psychiatric:         Mood and Affect: Mood normal.         Behavior: Behavior normal.         ED Course        Labs Reviewed   COMP METABOLIC PANEL (14) - Abnormal; Notable for the following components:       Result Value    Glucose 164 (*)     Calculated Osmolality 298 (*)     eGFR-Cr 55 (*)     Globulin  2.5 (*)     All other components within normal limits   CBC W/ DIFFERENTIAL - Abnormal; Notable for the following components:    HCT 38.8 (*)     RDW-SD 46.5 (*)     All other components within normal limits   URINALYSIS WITH CULTURE REFLEX   CBC WITH DIFFERENTIAL WITH PLATELET    Narrative:     The following orders were created for panel order CBC With Differential With Platelet.                  Procedure                               Abnormality         Status                                     ---------                               -----------         ------                                     CBC W/ DIFFERENTIAL[[id number]]          Abnormal            Final result                                                 Please view results for these tests on the individual orders.   RAINBOW DRAW LAVENDER   RAINBOW DRAW LIGHT GREEN   RAINBOW DRAW BLUE   RAINBOW DRAW GOLD       As Interpreted by me    Imaging Results Available and Reviewed while in ED: NONCONTRAST CT ABDOMEN AND PELVIS     Compared to 10/25/2019    IMPRESSION  Lack of intravenous contrast diminishes evaluation of the visceral organs.    The gallbladder is surgically absent.  No bile duct dilation.  No obstructive urinary calculi.  No hydronephrosis.  Right renal cortical cyst.  Moderate atherosclerotic vascular changes.  No AAA.  No pelvic postsurgical changes.    No signs of bowel obstruction.  No signs of acute appendicitis.   Large hiatal hernia.  Mild degenerative changes of the spine.        Report sent at 11:53 PM Shant oFnseca MD  ED Medications Administered:    Medications   ketorolac (Toradol) 15 MG/ML injection 15 mg (15 mg Intravenous Given 2/25/24 2238)         MDM     Vitals:    02/25/24 2221 02/25/24 2236 02/25/24 2300 02/25/24 2330   BP:   145/78 (!) 135/93   Pulse: 64 66 63 62   Resp:       Temp:       TempSrc:       SpO2: 96% 96% 95% 96%   Weight:       Height:         *I personally reviewed and interpreted all ED vitals.    Pulse Ox: 96%, Room air, Normal     Differential Diagnosis/ Diagnostic Considerations: Low back strain, kidney stone, AAA, intra-abdominal hemorrhage, diverticulitis, other    Complicating Factors: The patient already has does not have any pertinent problems on file. to contribute to the complexity of this ED evaluation.    Medical Decision Making  The patient presents to the ED with severe right flank pain.  Symptoms and exam suggest muscular strain given severe tenderness dysuria.  Patient feels symptoms due to a kidney stone.  Laboratory testing without abnormality.  CT abdomen pelvis obtained which showed no evidence for obstructive uropathy or other concerning findings.  Patient feeling better with pain meds in the ED and stable for discharge with continued pain management.    Problems Addressed:  Flank strain, initial encounter: acute illness or injury that poses a threat to life or bodily functions    Amount and/or Complexity of Data Reviewed  Labs: ordered. Decision-making details documented in ED Course.  Radiology: ordered and independent interpretation performed. Decision-making details documented in ED Course.     Details: I personally reviewed the patient's CT abdomen pelvis images and noted no free air or SBO or AAA    Risk  Prescription drug management.        Condition upon leaving the department: Stable    Disposition and Plan     Clinical Impression:  1. Flank strain, initial encounter        Disposition:  Discharge    Follow-up:  Janiya Griggs MD  [address] [facility] [address]  [phone]    Schedule an  appointment as soon as possible for a visit in 3 day(s)        Medications Prescribed:  Discharge Medication List as of 2/25/2024 11:33 PM        START taking these medications    Details   Ketorolac Tromethamine 10 MG Oral Tab Take 1 tablet (10 mg total) by mouth every 6 (six) hours as needed for Pain., Print, Disp-20 tablet, R-0

## 2024-03-04 ENCOUNTER — NURSE TRIAGE (OUTPATIENT)
Dept: INTERNAL MEDICINE CLINIC | Facility: CLINIC | Age: 84
End: 2024-03-04

## 2024-03-04 ENCOUNTER — OFFICE VISIT (OUTPATIENT)
Dept: INTERNAL MEDICINE CLINIC | Facility: CLINIC | Age: 84
End: 2024-03-04
Payer: MEDICARE

## 2024-03-04 VITALS
HEART RATE: 71 BPM | BODY MASS INDEX: 28.34 KG/M2 | SYSTOLIC BLOOD PRESSURE: 137 MMHG | WEIGHT: 187 LBS | HEIGHT: 68 IN | TEMPERATURE: 98 F | OXYGEN SATURATION: 98 % | DIASTOLIC BLOOD PRESSURE: 76 MMHG

## 2024-03-04 DIAGNOSIS — M54.40 ACUTE RIGHT-SIDED LOW BACK PAIN WITH SCIATICA, SCIATICA LATERALITY UNSPECIFIED: Primary | ICD-10-CM

## 2024-03-04 PROBLEM — J41.0 SMOKERS' COUGH (HCC): Chronic | Status: ACTIVE | Noted: 2024-03-04

## 2024-03-04 PROCEDURE — 1125F AMNT PAIN NOTED PAIN PRSNT: CPT | Performed by: INTERNAL MEDICINE

## 2024-03-04 PROCEDURE — 3008F BODY MASS INDEX DOCD: CPT | Performed by: INTERNAL MEDICINE

## 2024-03-04 PROCEDURE — 3075F SYST BP GE 130 - 139MM HG: CPT | Performed by: INTERNAL MEDICINE

## 2024-03-04 PROCEDURE — 1160F RVW MEDS BY RX/DR IN RCRD: CPT | Performed by: INTERNAL MEDICINE

## 2024-03-04 PROCEDURE — 99213 OFFICE O/P EST LOW 20 MIN: CPT | Performed by: INTERNAL MEDICINE

## 2024-03-04 PROCEDURE — 1159F MED LIST DOCD IN RCRD: CPT | Performed by: INTERNAL MEDICINE

## 2024-03-04 PROCEDURE — 3078F DIAST BP <80 MM HG: CPT | Performed by: INTERNAL MEDICINE

## 2024-03-04 RX ORDER — CYCLOBENZAPRINE HCL 5 MG
5 TABLET ORAL NIGHTLY PRN
Qty: 20 TABLET | Refills: 0 | Status: SHIPPED | OUTPATIENT
Start: 2024-03-04

## 2024-03-04 NOTE — TELEPHONE ENCOUNTER
Patient was seen in the ER on 2/25/2024 right flank pain. Was given ketorolac and took last tablet today. Medication did help with the pain but pain is still there. No other symptoms. Wanted to get more medication prescribed. Advised patient that should be seen in the office. No appointments with Dr Griggs today or tomorrow. Appointment made for today at 10:30am with Dr Debora Salas at the [facility].          Debora Salas MD  2/28/2024  6:25 PM CST       Seen in the ER which showed colonic diverticulosis no evidence of acute diverticulitis, large hiatal hernia, if he still has symptoms needs to see Dr. Griggs     Disposition and Plan    Clinical Impression:  1. Flank strain, initial encounter        Disposition:  Discharge     Follow-up:  Janiya Griggs MD  [address] [facility] [address]  [phone]     Schedule an appointment as soon as possible for a visit in 3 day(s)     Medications Prescribed:  Discharge Medication List as of 2/25/2024 11:33 PM              START taking these medications     Details   Ketorolac Tromethamine 10 MG Oral Tab Take 1 tablet (10 mg total) by mouth every 6 (six) hours as needed for Pain., Print, Disp-20 tablet, R-0

## 2024-03-11 ENCOUNTER — APPOINTMENT (OUTPATIENT)
Dept: URBAN - METROPOLITAN AREA CLINIC 244 | Age: 84
Setting detail: DERMATOLOGY
End: 2024-03-12

## 2024-03-11 DIAGNOSIS — D22 MELANOCYTIC NEVI: ICD-10-CM

## 2024-03-11 DIAGNOSIS — L57.0 ACTINIC KERATOSIS: ICD-10-CM

## 2024-03-11 DIAGNOSIS — L82.1 OTHER SEBORRHEIC KERATOSIS: ICD-10-CM

## 2024-03-11 DIAGNOSIS — L81.4 OTHER MELANIN HYPERPIGMENTATION: ICD-10-CM

## 2024-03-11 PROBLEM — D22.62 MELANOCYTIC NEVI OF LEFT UPPER LIMB, INCLUDING SHOULDER: Status: ACTIVE | Noted: 2024-03-11

## 2024-03-11 PROBLEM — D22.39 MELANOCYTIC NEVI OF OTHER PARTS OF FACE: Status: ACTIVE | Noted: 2024-03-11

## 2024-03-11 PROBLEM — D22.5 MELANOCYTIC NEVI OF TRUNK: Status: ACTIVE | Noted: 2024-03-11

## 2024-03-11 PROBLEM — D22.61 MELANOCYTIC NEVI OF RIGHT UPPER LIMB, INCLUDING SHOULDER: Status: ACTIVE | Noted: 2024-03-11

## 2024-03-11 PROCEDURE — OTHER MIPS QUALITY: OTHER

## 2024-03-11 PROCEDURE — OTHER LIQUID NITROGEN: OTHER

## 2024-03-11 PROCEDURE — 99213 OFFICE O/P EST LOW 20 MIN: CPT | Mod: 25

## 2024-03-11 PROCEDURE — OTHER COUNSELING: OTHER

## 2024-03-11 PROCEDURE — 17004 DESTROY PREMAL LESIONS 15/>: CPT

## 2024-03-11 ASSESSMENT — LOCATION DETAILED DESCRIPTION DERM
LOCATION DETAILED: LEFT PROXIMAL DORSAL FOREARM
LOCATION DETAILED: LEFT INFERIOR LATERAL MALAR CHEEK
LOCATION DETAILED: RIGHT DISTAL POSTERIOR UPPER ARM
LOCATION DETAILED: GLABELLA
LOCATION DETAILED: RIGHT SUPERIOR LATERAL FOREHEAD
LOCATION DETAILED: LEFT DISTAL POSTERIOR UPPER ARM
LOCATION DETAILED: RIGHT ELBOW
LOCATION DETAILED: LEFT SUPERIOR OCCIPITAL SCALP
LOCATION DETAILED: LEFT ELBOW
LOCATION DETAILED: RIGHT INFERIOR CENTRAL MALAR CHEEK
LOCATION DETAILED: RIGHT PROXIMAL DORSAL FOREARM
LOCATION DETAILED: LEFT SCAPHA
LOCATION DETAILED: LEFT MID TEMPLE
LOCATION DETAILED: SUPERIOR THORACIC SPINE
LOCATION DETAILED: INFERIOR THORACIC SPINE
LOCATION DETAILED: LEFT INFERIOR CENTRAL MALAR CHEEK
LOCATION DETAILED: MEDIAL FRONTAL SCALP

## 2024-03-11 ASSESSMENT — LOCATION SIMPLE DESCRIPTION DERM
LOCATION SIMPLE: GLABELLA
LOCATION SIMPLE: LEFT OCCIPITAL SCALP
LOCATION SIMPLE: LEFT EAR
LOCATION SIMPLE: FRONTAL SCALP
LOCATION SIMPLE: LEFT ELBOW
LOCATION SIMPLE: RIGHT FOREHEAD
LOCATION SIMPLE: LEFT FOREARM
LOCATION SIMPLE: LEFT CHEEK
LOCATION SIMPLE: UPPER BACK
LOCATION SIMPLE: RIGHT FOREARM
LOCATION SIMPLE: LEFT POSTERIOR UPPER ARM
LOCATION SIMPLE: RIGHT POSTERIOR UPPER ARM
LOCATION SIMPLE: RIGHT CHEEK
LOCATION SIMPLE: RIGHT ELBOW
LOCATION SIMPLE: LEFT TEMPLE

## 2024-03-11 ASSESSMENT — LOCATION ZONE DERM
LOCATION ZONE: ARM
LOCATION ZONE: TRUNK
LOCATION ZONE: SCALP
LOCATION ZONE: FACE
LOCATION ZONE: EAR

## 2024-03-11 NOTE — PROCEDURE: LIQUID NITROGEN
Render Post-Care Instructions In Note?: no
Total Number Of Aks Treated: 22
Duration Of Freeze Thaw-Cycle (Seconds): 0
Consent: The patient's consent was obtained including but not limited to risks of crusting, scabbing, blistering, scarring, darker or lighter pigmentary change, recurrence, incomplete removal and infection.
Post-Care Instructions: I reviewed with the patient in detail post-care instructions. Patient is to wear sunprotection, and avoid picking at any of the treated lesions. Pt may apply Vaseline to crusted or scabbing areas.
Detail Level: Zone

## 2024-04-05 NOTE — TELEPHONE ENCOUNTER
Please review. Protocol Failed or has No Protocol.    Requested Prescriptions   Pending Prescriptions Disp Refills    METFORMIN HCL 1000 MG Oral Tab [Pharmacy Med Name: METFORMIN HYDROCHLORIDE 1000 MG Tablet] 180 tablet 3     Sig: TAKE 1 TABLET TWICE DAILY       Diabetes Medication Protocol Failed - 4/3/2024 10:34 AM        Failed - Last A1C < 7.5 and within past 6 months     Lab Results   Component Value Date    A1C 7.6 (H) 12/14/2023             Passed - In person appointment or virtual visit in the past 6 mos or appointment in next 3 mos     Recent Outpatient Visits              1 month ago Acute right-sided low back pain with sciatica, sciatica laterality unspecified    [facility]    Office Visit    3 months ago Abnormal computed tomography of ureter    [facility] Janiya Griggs MD    Office Visit    6 months ago Stage 3b chronic kidney disease (HCC)    [facility]    Telemedicine    10 months ago Uncontrolled type 2 diabetes mellitus with hyperglycemia ([facility])    [facility]    Office Visit    11 months ago Stage 3b chronic kidney disease ([facility])    [facility]    Office Visit          Future Appointments         Provider Department Appt Notes    In 2 months Janiya Griggs MD [facility] 6 month fu**DUE FOR MEDICARE PX **               Passed - Microalbumin procedure in past 12 months or taking ACE/ARB        Passed - EGFRCR or GFRNAA > 50     GFR Evaluation  EGFRCR: 55 , resulted on 2/25/2024          Passed - GFR in the past 12 months             Future Appointments         Provider Department Appt Notes    In 2 months Janiya Griggs MD [facility]  [facility] [place] 6 month fu**DUE FOR MEDICARE PX **            Recent Outpatient Visits              1 month ago Acute right-sided low back pain with sciatica, sciatica laterality unspecified    [facility], Debora Knutson MD    Office Visit    3 months ago Abnormal computed tomography of ureter    [facility], Janiya Roberts MD    Office Visit    6 months ago Stage 3b chronic kidney disease (HCC)    [facility], Janiya Roberts MD    Telemedicine    10 months ago Uncontrolled type 2 diabetes mellitus with hyperglycemia ([facility])    [facility], Marisel Ch APRN    Office Visit    11 months ago Stage 3b chronic kidney disease ([facility])    [facility], Janiya Roberts MD    Office Visit

## 2024-05-23 RX ORDER — GLIMEPIRIDE 1 MG/1
1 TABLET ORAL 3 TIMES DAILY
Qty: 270 TABLET | Refills: 3 | Status: SHIPPED | OUTPATIENT
Start: 2024-05-23

## 2024-05-23 NOTE — TELEPHONE ENCOUNTER
Please review. Protocol Failed; No Protocol    Requested Prescriptions   Pending Prescriptions Disp Refills    GLIMEPIRIDE 1 MG Oral Tab [Pharmacy Med Name: GLIMEPIRIDE 1 MG Tablet] 270 tablet 3     Sig: TAKE 1 TABLET THREE TIMES DAILY       Diabetes Medication Protocol Failed - 5/20/2024  8:41 PM        Failed - Last A1C < 7.5 and within past 6 months     Lab Results   Component Value Date    A1C 7.6 (H) 12/14/2023             Passed - In person appointment or virtual visit in the past 6 mos or appointment in next 3 mos     Recent Outpatient Visits              2 months ago Acute right-sided low back pain with sciatica, sciatica laterality unspecified    [facility] Debora Salas MD    Office Visit    5 months ago Abnormal computed tomography of ureter    [facility] Janiya Griggs MD    Office Visit    8 months ago Stage 3b chronic kidney disease ([facility])    [facility] Janiya Griggs MD    Telemedicine    11 months ago Uncontrolled type 2 diabetes mellitus with hyperglycemia ([facility])    [facility] Marisel Ward APRN    Office Visit    1 year ago Stage 3b chronic kidney disease ([facility])    [facility] Janiya Griggs MD    Office Visit          Future Appointments         Provider Department Appt Notes    In 3 weeks Janiya Griggs MD [facility] 6 month fu**DUE FOR MEDICARE PX **                    Passed - Microalbumin procedure in past 12 months or taking ACE/ARB        Passed - EGFRCR or GFRNAA > 50     GFR Evaluation  EGFRCR: 55 , resulted on 2/25/2024          Passed - GFR in the past 12 months               Future Appointments         Provider Department Appt Notes    In 3 weeks Janiya Griggs MD [facility],  [place] 6 month fu**DUE FOR MEDICARE PX **          Recent Outpatient Visits              2 months ago Acute right-sided low back pain with sciatica, sciatica laterality unspecified    [facility], Debora Knutson MD    Office Visit    5 months ago Abnormal computed tomography of ureter    [facility], Janiya Roberts MD    Office Visit    8 months ago Stage 3b chronic kidney disease ([facility])    [facility], Janiya Roberts MD    Telemedicine    11 months ago Uncontrolled type 2 diabetes mellitus with hyperglycemia ([facility])    [facility], Marisel Ch APRN    Office Visit    1 year ago Stage 3b chronic kidney disease ([facility])    [facility], Janiya Roberts MD    Office Visit

## 2024-06-03 ENCOUNTER — LAB ENCOUNTER (OUTPATIENT)
Dept: LAB | Age: 84
End: 2024-06-03
Attending: INTERNAL MEDICINE
Payer: MEDICARE

## 2024-06-03 DIAGNOSIS — D50.8 OTHER IRON DEFICIENCY ANEMIA: ICD-10-CM

## 2024-06-03 DIAGNOSIS — E11.9 TYPE 2 DIABETES MELLITUS WITHOUT COMPLICATION, WITHOUT LONG-TERM CURRENT USE OF INSULIN (HCC): ICD-10-CM

## 2024-06-03 LAB
BASOPHILS # BLD AUTO: 0.05 X10(3) UL (ref 0–0.2)
BASOPHILS NFR BLD AUTO: 0.9 %
DEPRECATED HBV CORE AB SER IA-ACNC: 19.1 NG/ML
DEPRECATED RDW RBC AUTO: 44 FL (ref 35.1–46.3)
EOSINOPHIL # BLD AUTO: 0.56 X10(3) UL (ref 0–0.7)
EOSINOPHIL NFR BLD AUTO: 9.7 %
ERYTHROCYTE [DISTWIDTH] IN BLOOD BY AUTOMATED COUNT: 12.3 % (ref 11–15)
EST. AVERAGE GLUCOSE BLD GHB EST-MCNC: 174 MG/DL (ref 68–126)
HBA1C MFR BLD: 7.7 % (ref ?–5.7)
HCT VFR BLD AUTO: 42.6 %
HGB BLD-MCNC: 14.6 G/DL
IMM GRANULOCYTES # BLD AUTO: 0.01 X10(3) UL (ref 0–1)
IMM GRANULOCYTES NFR BLD: 0.2 %
IRON SATN MFR SERPL: 23 %
IRON SERPL-MCNC: 91 UG/DL
LYMPHOCYTES # BLD AUTO: 1.63 X10(3) UL (ref 1–4)
LYMPHOCYTES NFR BLD AUTO: 28.2 %
MCH RBC QN AUTO: 33.2 PG (ref 26–34)
MCHC RBC AUTO-ENTMCNC: 34.3 G/DL (ref 31–37)
MCV RBC AUTO: 96.8 FL
MONOCYTES # BLD AUTO: 0.49 X10(3) UL (ref 0.1–1)
MONOCYTES NFR BLD AUTO: 8.5 %
NEUTROPHILS # BLD AUTO: 3.05 X10 (3) UL (ref 1.5–7.7)
NEUTROPHILS # BLD AUTO: 3.05 X10(3) UL (ref 1.5–7.7)
NEUTROPHILS NFR BLD AUTO: 52.5 %
PLATELET # BLD AUTO: 279 10(3)UL (ref 150–450)
RBC # BLD AUTO: 4.4 X10(6)UL
TIBC SERPL-MCNC: 401 UG/DL (ref 250–425)
TRANSFERRIN SERPL-MCNC: 269 MG/DL (ref 215–365)
WBC # BLD AUTO: 5.8 X10(3) UL (ref 4–11)

## 2024-06-03 PROCEDURE — 84466 ASSAY OF TRANSFERRIN: CPT

## 2024-06-03 PROCEDURE — 82728 ASSAY OF FERRITIN: CPT

## 2024-06-03 PROCEDURE — 83036 HEMOGLOBIN GLYCOSYLATED A1C: CPT

## 2024-06-03 PROCEDURE — 85025 COMPLETE CBC W/AUTO DIFF WBC: CPT

## 2024-06-03 PROCEDURE — 83540 ASSAY OF IRON: CPT

## 2024-06-03 PROCEDURE — 36415 COLL VENOUS BLD VENIPUNCTURE: CPT

## 2024-06-04 NOTE — PROGRESS NOTES
CBC Normal (white blood cells and red blood cells and platelets), iron storage is better than prior but still low.  Iron levels are still low.  Would recommend continuing with iron 65 twice a day with vitamin C 500 once a day and recheck iron level in 3 months.  Hemoglobin A1c which is a 3-month average of sugars is worse than prior.  Goal is 6.5 or less.Careful with diet and excercise at least 30 minutes 3-4 times a week. Check sugars at different times on different dates. Careful with low sugars. Carry something with you and check sugar if can. Can carry ye cracker, etc. Decrease carbohydrates. But also, careful with fruits and natural sugars.One serving a day and no more than 1 handful every day. Check feet  every AM and careful with sores and ulcers on feet bilaterally. Check eyes every year with dilated eye exam.  Check sugars.  2-hour postmeal should be less than 140s.  Pre-meal should be 's.  Both equally affected A1c.  Recheck A1c in 3 months.  Would recommend following up with endocrinology diabetic teaching.

## 2024-06-12 ENCOUNTER — TELEPHONE (OUTPATIENT)
Dept: INTERNAL MEDICINE CLINIC | Facility: CLINIC | Age: 84
End: 2024-06-12

## 2024-06-12 NOTE — TELEPHONE ENCOUNTER
Called patient to inform him per doctor if he could come in 15 mins prior to apt to fill out medicare questions. No answer left message.

## 2024-06-13 ENCOUNTER — OFFICE VISIT (OUTPATIENT)
Dept: INTERNAL MEDICINE CLINIC | Facility: CLINIC | Age: 84
End: 2024-06-13
Payer: MEDICARE

## 2024-06-13 VITALS
HEIGHT: 68 IN | OXYGEN SATURATION: 95 % | HEART RATE: 75 BPM | RESPIRATION RATE: 16 BRPM | DIASTOLIC BLOOD PRESSURE: 70 MMHG | TEMPERATURE: 99 F | BODY MASS INDEX: 27.74 KG/M2 | SYSTOLIC BLOOD PRESSURE: 124 MMHG | WEIGHT: 183 LBS

## 2024-06-13 DIAGNOSIS — E11.9 TYPE 2 DIABETES MELLITUS WITHOUT COMPLICATION, WITHOUT LONG-TERM CURRENT USE OF INSULIN (HCC): Primary | ICD-10-CM

## 2024-06-13 DIAGNOSIS — R79.89 ELEVATED PTHRP LEVEL: ICD-10-CM

## 2024-06-13 DIAGNOSIS — R93.5 ABNORMAL CT OF THE ABDOMEN: ICD-10-CM

## 2024-06-13 DIAGNOSIS — I10 ESSENTIAL HYPERTENSION: ICD-10-CM

## 2024-06-13 DIAGNOSIS — R91.1 PULMONARY NODULE: ICD-10-CM

## 2024-06-13 DIAGNOSIS — G89.29 CHRONIC RIGHT-SIDED LOW BACK PAIN, UNSPECIFIED WHETHER SCIATICA PRESENT: ICD-10-CM

## 2024-06-13 DIAGNOSIS — E83.42 HYPOMAGNESEMIA: ICD-10-CM

## 2024-06-13 DIAGNOSIS — E78.2 MIXED HYPERLIPIDEMIA: ICD-10-CM

## 2024-06-13 DIAGNOSIS — Z00.00 ENCOUNTER FOR ANNUAL HEALTH EXAMINATION: ICD-10-CM

## 2024-06-13 DIAGNOSIS — N18.31 STAGE 3A CHRONIC KIDNEY DISEASE (HCC): Chronic | ICD-10-CM

## 2024-06-13 DIAGNOSIS — M54.50 CHRONIC RIGHT-SIDED LOW BACK PAIN, UNSPECIFIED WHETHER SCIATICA PRESENT: ICD-10-CM

## 2024-06-13 DIAGNOSIS — D80.1 HYPOGAMMAGLOBULINEMIA (HCC): ICD-10-CM

## 2024-06-13 DIAGNOSIS — E83.52 HYPERCALCEMIA: ICD-10-CM

## 2024-06-13 DIAGNOSIS — D50.8 OTHER IRON DEFICIENCY ANEMIA: ICD-10-CM

## 2024-06-13 DIAGNOSIS — R79.89 ABNORMAL TSH: ICD-10-CM

## 2024-06-13 DIAGNOSIS — E87.5 HYPERKALEMIA: ICD-10-CM

## 2024-06-13 LAB
APPEARANCE: CLEAR
BILIRUBIN: NEGATIVE
GLUCOSE (URINE DIPSTICK): >=1000 MG/DL
LEUKOCYTES: NEGATIVE
MULTISTIX LOT#: ABNORMAL NUMERIC
NITRITE, URINE: NEGATIVE
OCCULT BLOOD: NEGATIVE
PH, URINE: 5.5 (ref 4.5–8)
PROTEIN (URINE DIPSTICK): NEGATIVE MG/DL
SPECIFIC GRAVITY: 1.02 (ref 1–1.03)
URINE-COLOR: YELLOW
UROBILINOGEN,SEMI-QN: 0.2 MG/DL (ref 0–1.9)

## 2024-06-13 RX ORDER — MAGNESIUM OXIDE 400 MG (241.3 MG MAGNESIUM) TABLET
325 TABLET
COMMUNITY

## 2024-06-13 NOTE — PROGRESS NOTES
HPI:    Patient ID: Israel Cornelius is a 83 year old male.    Israel Cornelius is a 83 year old male who presents for a complete physical exam.   Israel Cornelius is a 83 year old male who presents for a Medicare Assessment.     Chief Complaint   Patient presents with    Annual     Medicare annual px      Patient here for follow up of Diabetes.  Has been taking medications regularly.  Occ. forgets pill at lunch. One low but not check due to not eat lunch as much.   Checks sugars 0 times daily.  Fasting sugars average not check recently. No lows.   Watches diabetic diet, low salt.  Diabetic Flow sheet      6/17/2024     1:30 PM 6/13/2024     2:44 PM 6/13/2024     2:18 PM 6/13/2024     2:17 PM   DIABETIC FLOWSHEET ([facility])   BMI   27.83 kg/m2    Lisinopril 5 mg Daily OR 5 mg Daily OR  5 mg Daily OR  5 mg Daily OR    Weight (enc vitals)   183 lb    BP (enc vitals)   124/70    Last Foot Exam  6/13/2024     PHQ2 Score    0      Hypertension  Patient is here for follow up of hypertension. BP at home: not check.   Has been compliant with medications.  Exercise level: somewhat active (walks X 25 minutes) and has been following low salt diet.  Weight has been stable. Eats out less. Cooks more. No added salt.   Wt Readings from Last 3 Encounters:   06/13/24 183 lb (83 kg)   03/04/24 187 lb (84.8 kg)   02/25/24 174 lb (78.9 kg)     BP Readings from Last 3 Encounters:   06/13/24 124/70   03/04/24 137/76   02/25/24 (!) 135/93     Labs:   Lab Results   Component Value Date/Time     (H) 02/25/2024 09:04 PM     02/25/2024 09:04 PM    K 4.7 02/25/2024 09:04 PM     02/25/2024 09:04 PM    CO2 26.0 02/25/2024 09:04 PM    CREATSERUM 1.30 02/25/2024 09:04 PM    CA 9.7 02/25/2024 09:04 PM    AST 26 02/25/2024 09:04 PM    ALT 29 02/25/2024 09:04 PM    TSH 2.040 08/16/2023 11:52 AM    T4F 1.1 01/12/2023 09:15 AM        Lab Results   Component Value Date/Time    CHOLEST 96 06/27/2023 07:54 AM    HDL 39 (L) 06/27/2023  07:54 AM    TRIG 143 06/27/2023 07:54 AM    LDL 32 06/27/2023 07:54 AM    NONHDLC 57 06/27/2023 07:54 AM          Labs:   Lab Results   Component Value Date/Time    WBC 5.8 06/03/2024 07:34 AM    HGB 14.6 06/03/2024 07:34 AM    .0 06/03/2024 07:34 AM      Lab Results   Component Value Date/Time     (H) 02/25/2024 09:04 PM     02/25/2024 09:04 PM    K 4.7 02/25/2024 09:04 PM     02/25/2024 09:04 PM    CO2 26.0 02/25/2024 09:04 PM    CREATSERUM 1.30 02/25/2024 09:04 PM    CA 9.7 02/25/2024 09:04 PM    ALB 4.3 02/25/2024 09:04 PM    TP 6.8 02/25/2024 09:04 PM    ALKPHO 74 02/25/2024 09:04 PM    AST 26 02/25/2024 09:04 PM    ALT 29 02/25/2024 09:04 PM    BILT 0.2 02/25/2024 09:04 PM    TSH 2.040 08/16/2023 11:52 AM    T4F 1.1 01/12/2023 09:15 AM        Lab Results   Component Value Date/Time    CHOLEST 96 06/27/2023 07:54 AM    HDL 39 (L) 06/27/2023 07:54 AM    TRIG 143 06/27/2023 07:54 AM    LDL 32 06/27/2023 07:54 AM    NONHDLC 57 06/27/2023 07:54 AM       Lab Results   Component Value Date/Time    A1C 7.7 (H) 06/03/2024 07:34 AM      Lab Results   Component Value Date    VITD 37.1 08/16/2023         No recommendations at this time    Allergies:  Allergies   Allergen Reactions    Shellfish-Derived Products UNKNOWN     Current Outpatient Medications   Medication Sig Dispense Refill    ferrous sulfate 325 (65 FE) MG Oral Tab EC Take 1 tablet (325 mg total) by mouth daily with breakfast.      glimepiride 1 MG Oral Tab Take 1 tablet (1 mg total) by mouth 3 (three) times daily. 270 tablet 3    metFORMIN HCl 1000 MG Oral Tab Take 1 tablet (1,000 mg total) by mouth 2 (two) times daily. 180 tablet 3    lisinopril 5 MG Oral Tab Take 1 tablet (5 mg total) by mouth daily. 90 tablet 3    levothyroxine 50 MCG Oral Tab Take 1 tablet (50 mcg total) by mouth before breakfast. 90 tablet 3    TRUEplus Lancets 33G Does not apply Misc TEST BLOOD SUGAR ONE TIME DAILY IN THE MORNING 100 each 11    rosuvastatin 20  MG Oral Tab Take 1 tablet (20 mg total) by mouth nightly. 90 tablet 3    Alcohol Swabs 70 % Does not apply Pads Clean area prior to checking blood glucose daily. 100 each 0    Blood Glucose Monitoring Suppl (TRUE METRIX METER) w/Device Does not apply Kit       Blood Gluc Meter Disp-Strips Does not apply Device Code: E11.9. Checks qam. 50 each 5    NON FORMULARY FISH OIL. Patient doen't know how many mg. He takes it once  a day.      Glucose Blood (ONETOUCH ULTRA) In Vitro Strip Dx. E11.9. Checks qam. 100 each 11    aspirin 81 MG Oral Tab Take 1 tablet (81 mg total) by mouth daily.      Calcium Carbonate-Vitamin D 600-200 MG-UNIT Oral Tab Take by mouth.      Multiple Vitamins-Minerals (MULTIVITAMIN OR) Take by mouth.      cyclobenzaprine 5 MG Oral Tab Take 1 tablet (5 mg total) by mouth nightly as needed for Muscle spasms. (Patient not taking: Reported on 6/13/2024) 20 tablet 0      Past Medical History:    Cataracts, bilateral    Footdrop    right    History of blood transfusion    5 years ago    Hypogammaglobulinemia (HCC)    mild, IgG1 and IgM; no treatment    Inguinal hernia, right    Repair    Positive PPD    CXR negative.     Prostate cancer (HCC)      Past Surgical History:   Procedure Laterality Date    Appendectomy      Cataract extraction w/ intraocular lens implant Left 12/09/2020    [facility] CE LENSX/ORA MAC TORIC OS DR. SCRUGGS  [id number] +18.50    Cholecystectomy      Colonoscopy  09/2013    osis    Colonoscopy  03/2020    Colonoscopy N/A 3/4/2020    Procedure: COLONOSCOPY, POSSIBLE BIOPSY, POSSIBLE POLYPECTOMY 70462;  Surgeon: Salvador Perkins MD;  [place] [facility] [place] [facility]    Egd  09/2013    stricture and large HH w Alonso erosions    Inguinal hernia repair Right 2005    Other surgical history  2001    radical prostatectomy      Family History   Problem Relation Age of Onset    Heart Attack Father         CAD    Cancer Father         lung    Cancer Sister         colon    Other (Other) Mother          alzheimers      Social History:  Social History     Socioeconomic History    Marital status:    Tobacco Use    Smoking status: Former     Current packs/day: 0.00     Types: Cigarettes     Quit date: 1998     Years since quittin.4    Smokeless tobacco: Never   Vaping Use    Vaping status: Never Used   Substance and Sexual Activity    Alcohol use: Yes     Comment: rarely    Drug use: No   Other Topics Concern    Caffeine Concern Yes     Comment: coffee, 2 cups       History/Other:     Patient Active Problem List   Diagnosis    Hyperlipidemia    Hypogammaglobulinemia (HCC)    Iron deficiency anemia    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    Essential hypertension    Family history of colon cancer    Borderline glaucoma    Personal history of prostate cancer    Splenic infarct    Abnormal computed tomography of ureter    Hyperkalemia    Ureteral stricture    Lumbar stenosis with neurogenic claudication    Type 2 diabetes mellitus without complication, without long-term current use of insulin (HCC)    Vaccine counseling    Footdrop    Positive PPD    Dry eye syndrome of bilateral lacrimal glands    CKD (chronic kidney disease) stage 3, GFR 30-59 ml/min (HCC)    Chronic pain of right knee    Hypercalcemia    Hypomagnesemia    Abnormal TSH    Abnormal CT of the abdomen    Pulmonary nodule    Elevated PTHrP level    Combined forms of age-related cataract of right eye    Squamous cell carcinoma in situ (SCCIS) of skin    Smokers' cough (HCC)       No LMP for male patient.    Tobacco:  He smoked tobacco in the past but quit greater than 12 months ago.  Social History     Tobacco Use   Smoking Status Former    Current packs/day: 0.00    Types: Cigarettes    Quit date: 1998    Years since quittin.4   Smokeless Tobacco Never        CAGE Alcohol Screen:   CAGE screening score of 0 on 2024, showing low risk of alcohol abuse.        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  Now wears a brace to work on the car and helps.    Low Back Pain  This is a new problem. The current episode started more than 1 month ago. The problem occurs daily. The problem is unchanged. The pain is present in the gluteal. The quality of the pain is described as aching. The pain does not radiate. The pain is at a severity of 6/10. The pain is moderate. Pertinent negatives include no abdominal pain, bowel incontinence, chest pain, dysuria, fever, headaches, leg pain, numbness, paresthesias, pelvic pain, perianal numbness, tingling or weakness. Treatments tried: Salon pas roll on. The treatment provided moderate relief.       Review of Systems   Constitutional: Negative.  Negative for activity change, appetite change, chills, diaphoresis, fatigue, fever and unexpected weight change.   HENT: Negative.  Negative for congestion, dental problem, drooling, ear discharge, ear pain, facial swelling, hearing loss, mouth sores, nosebleeds, postnasal drip, rhinorrhea, sinus pressure, sinus pain, sneezing, sore throat, tinnitus, trouble swallowing and voice change.    Eyes: Negative.  Negative for photophobia, pain, discharge, redness, itching and visual disturbance.   Respiratory: Negative.  Negative for apnea, cough, choking, chest tightness, shortness of breath, wheezing and stridor.    Cardiovascular: Negative.  Negative for chest pain, palpitations and leg swelling.   Gastrointestinal: Negative.  Negative for abdominal distention, abdominal pain, anal bleeding, blood in stool, bowel incontinence, constipation, diarrhea, nausea, rectal pain and vomiting.    Endocrine: Negative for cold intolerance, heat intolerance, polydipsia, polyphagia and polyuria.   Genitourinary: Negative.  Negative for decreased urine volume, difficulty urinating, dysuria, flank pain, frequency, genital sores, hematuria, pelvic pain, penile discharge, penile pain, penile swelling, scrotal swelling, testicular pain and urgency.   Skin: Negative.  Negative for color change, pallor, rash and wound.   Neurological: Negative.  Negative for dizziness, tingling, tremors, seizures, syncope, facial asymmetry, speech difficulty, weakness, light-headedness, numbness, headaches and paresthesias.   Psychiatric/Behavioral: Negative.  Negative for agitation, behavioral problems, confusion, decreased concentration, dysphoric mood, hallucinations, self-injury, sleep disturbance and suicidal ideas. The patient is not nervous/anxious and is not hyperactive.    All other systems reviewed and are negative.          Functional Ability/Status:   Israel Cornelius has a completely normal functional assessment. See flowsheet for details.        Fall Risk Assessment:   He has been screened for Falls and is low risk.        Medicare Hearing Assessment:   Hearing Screening    Time taken: 6/13/2024  2:30 PM  Screening Method: Finger Rub  Finger Rub Result: Pass         Depression Screening (PHQ-2/PHQ-9): PHQ-2 SCORE: 0  , done 6/13/2024   If you checked off any problems, how difficult have these problems made it for you to do your work, take care of things at home, or get along with other people?: Not difficult at all           Cognitive Assessment:   He had a completely normal cognitive assessment - see flowsheet entries       Supplementary Documentation:     In the past six months, have you lost more than 10 pounds without trying?: 2 - No  Has your appetite been poor?: No  Type of Diet: Balanced  How does the patient maintain a good energy level?: Appropriate Exercise  How would you describe your daily physical  activity?: Moderate  How would you describe your current health state?: Good  How do you maintain positive mental well-being?: Social Interaction  On a scale of 0 to 10, with 0 being no pain and 10 being severe pain, what is your pain level?: 0 - (None)  In the past six months, have you experienced urine leakage?: 0-No  At any time do you feel concerned for the safety/well-being of yourself and/or your children, in your home or elsewhere?: No  Have you had any immunizations at another office such as Influenza, Hepatitis B, Tetanus, or Pneumococcal?: No    Visual Acuity:   Right Eye Visual Acuity: Uncorrected Right Eye Chart Acuity: 20/40   Left Eye Visual Acuity: Uncorrected Left Eye Chart Acuity: 20/40   Both Eyes Visual Acuity: Uncorrected Both Eyes Chart Acuity: 20/20   Able To Tolerate Visual Acuity: Yes        PHYSICAL EXAM:   /70 (BP Location: Right arm, Patient Position: Sitting, Cuff Size: adult)   Pulse 75   Temp 98.7 °F (37.1 °C) (Temporal)   Resp 16   Ht 5' 8\" (1.727 m)   Wt 183 lb (83 kg)   SpO2 95%   BMI 27.83 kg/m²   BP Readings from Last 3 Encounters:   06/13/24 124/70   03/04/24 137/76   02/25/24 (!) 135/93     Wt Readings from Last 3 Encounters:   06/13/24 183 lb (83 kg)   03/04/24 187 lb (84.8 kg)   02/25/24 174 lb (78.9 kg)      Body mass index is 27.83 kg/m².   Physical Exam  Vitals and nursing note reviewed.   Constitutional:       General: He is not in acute distress.     Appearance: Normal appearance. He is well-developed and well-groomed. He is not ill-appearing, toxic-appearing or diaphoretic.      Interventions: He is not intubated.  HENT:      Head: Normocephalic and atraumatic.      Right Ear: Hearing, tympanic membrane, ear canal and external ear normal. No decreased hearing noted. No laceration, drainage, swelling or tenderness. No middle ear effusion. There is no impacted cerumen. No foreign body. No mastoid tenderness. No PE tube. No hemotympanum. Tympanic membrane is not  injected, scarred, perforated, erythematous, retracted or bulging. Tympanic membrane has normal mobility.      Left Ear: Hearing, tympanic membrane, ear canal and external ear normal. No decreased hearing noted. No laceration, drainage, swelling or tenderness.  No middle ear effusion. There is no impacted cerumen. No foreign body. No mastoid tenderness. No PE tube. No hemotympanum. Tympanic membrane is not injected, scarred, perforated, erythematous, retracted or bulging. Tympanic membrane has normal mobility.      Nose:      Right Sinus: No maxillary sinus tenderness or frontal sinus tenderness.      Left Sinus: No maxillary sinus tenderness or frontal sinus tenderness.      Mouth/Throat:      Lips: Pink. No lesions.      Mouth: Mucous membranes are moist. No injury, lacerations, oral lesions or angioedema.      Dentition: No dental tenderness, gingival swelling, dental abscesses or gum lesions.      Tongue: No lesions. Tongue does not deviate from midline.      Palate: No mass and lesions.      Pharynx: Oropharynx is clear. Uvula midline. No pharyngeal swelling, oropharyngeal exudate, posterior oropharyngeal erythema or uvula swelling.      Tonsils: No tonsillar exudate or tonsillar abscesses.   Eyes:      General: Lids are normal. No scleral icterus.        Right eye: No foreign body, discharge or hordeolum.         Left eye: No foreign body, discharge or hordeolum.      Extraocular Movements: Extraocular movements intact.      Right eye: Normal extraocular motion and no nystagmus.      Left eye: Normal extraocular motion and no nystagmus.      Conjunctiva/sclera: Conjunctivae normal.      Right eye: Right conjunctiva is not injected. No chemosis, exudate or hemorrhage.     Left eye: Left conjunctiva is not injected. No chemosis, exudate or hemorrhage.     Pupils: Pupils are equal, round, and reactive to light.   Neck:      Thyroid: No thyroid mass, thyromegaly or thyroid tenderness.      Vascular: No carotid  bruit or JVD.      Trachea: Trachea and phonation normal. No tracheal tenderness, tracheostomy, abnormal tracheal secretions or tracheal deviation.   Cardiovascular:      Rate and Rhythm: Normal rate and regular rhythm.      Pulses: Normal pulses.           Carotid pulses are 2+ on the right side and 2+ on the left side.       Radial pulses are 2+ on the right side and 2+ on the left side.        Dorsalis pedis pulses are 2+ on the right side and 2+ on the left side.        Posterior tibial pulses are 2+ on the right side and 2+ on the left side.      Heart sounds: Normal heart sounds, S1 normal and S2 normal.   Pulmonary:      Effort: Pulmonary effort is normal. No tachypnea, bradypnea, accessory muscle usage, prolonged expiration, respiratory distress or retractions. He is not intubated.      Breath sounds: Normal breath sounds and air entry. No stridor, decreased air movement or transmitted upper airway sounds. No decreased breath sounds, wheezing, rhonchi or rales.   Chest:      Chest wall: No tenderness.   Abdominal:      General: Bowel sounds are normal. There is no distension.      Palpations: Abdomen is soft. Abdomen is not rigid.      Tenderness: There is no abdominal tenderness. There is no right CVA tenderness, left CVA tenderness, guarding or rebound.   Musculoskeletal:      Cervical back: Neck supple.      Lumbar back: Deformity present. No swelling, edema, signs of trauma, lacerations, spasms, tenderness or bony tenderness. Decreased range of motion. Negative right straight leg raise test and negative left straight leg raise test. No scoliosis.      Right upper leg: No swelling, edema, deformity, lacerations, tenderness or bony tenderness.      Left upper leg: No swelling, edema, deformity, lacerations, tenderness or bony tenderness.      Right lower leg: No edema.      Left lower leg: No edema.   Lymphadenopathy:      Head:      Right side of head: No submental, submandibular, preauricular, posterior  auricular or occipital adenopathy.      Left side of head: No submental, submandibular, preauricular, posterior auricular or occipital adenopathy.      Cervical: No cervical adenopathy.      Right cervical: No superficial, deep or posterior cervical adenopathy.     Left cervical: No superficial, deep or posterior cervical adenopathy.      Upper Body:      Right upper body: No supraclavicular adenopathy.      Left upper body: No supraclavicular adenopathy.   Skin:     General: Skin is warm and dry.      Coloration: Skin is not pale.      Findings: No erythema or rash.      Nails: There is no clubbing.   Neurological:      Mental Status: He is alert and oriented to person, place, and time.   Psychiatric:         Speech: Speech normal.         Behavior: Behavior normal. Behavior is cooperative.     Bilateral barefoot skin diabetic exam is normal, visualized feet and the appearance is normal.  Bilateral monofilament sensation of both feet is normal.  Pulsation pedal pulse exam of both lower legs/feet is normal as well.          ASSESSMENT/PLAN:     Encounter Diagnoses   Name Primary?    Type 2 diabetes mellitus without complication, without long-term current use of insulin ([facility]) Stable. Careful with diet and excercise at least 30 minutes 3-4 times a week. Check sugars at different times on different dates. Careful with low sugars. Carry something with you and check sugar if can. Can carry ye cracker, etc. Decrease carbohydrates. But also, careful with fruits and natural sugars.One serving a day and no more than 1 handful every day. Check feet  every AM and careful with sores and ulcers on feet bilaterally. Check eyes every year with dilated eye exam.  Check sugars.  2-hour postmeal should be less than 140s.  Pre-meal should be 's.  Both equally affected A1c.  Discussed importance of glycemic control to prevent complications of diabetes  -Discussed complications of diabetes include retinopathy, neuropathy,  nephropathy and cardiovascular disease  -Discussed ABCs of DM  -Discussed importance of SBGM  -Discussed importance of low CHO diet, recommend 45gm per meal or 135gm per day  -Normal foot exam  -UTD with optho, Follow up 9-24.   -Normotensive    Yes    Pulmonary nodule  Narrative   PROCEDURE: CT CHEST ABDOMEN PELVIS (ALL CONTRAST ONLY) (CPT=71260/96155)     COMPARISON: [facility], CT UROGRAM (W+WO)  (CPT=74178), 8/08/2018, 2:43 PM.  [facility], CT ABDOMEN + PELVIS KIDNEYSTONE 2D RNDR (NO IV NO ORAL) (CPT=741, 10/25/2019, 10:20 AM.  [facility], CT ABDOMEN +  PELVIS KIDNEYSTONE 2D RNDR (NO IV NO ORAL) (CPT=741, 9/20/2018, 1:39 AM.     INDICATIONS: History of prostate cancer and hypercalcemia.     TECHNIQUE:   CT images of the chest, abdomen and pelvis were obtained with intravenous contrast material.  Automated exposure control for dose reduction was used. Adjustment of the mA and/or kV was done based on the patient's size. Use of iterative  reconstruction technique for dose reduction was used.  Dose information is transmitted to the ACR (American College of Radiology) NRDR (National Radiology Data Registry) which includes the Dose Index Registry.     FINDINGS:  LINES AND TUBES: None.     MEDIASTINUM/VASCULATURE: There are multiple mildly prominent mediastinal lymph nodes which are nonspecific and measure less than 1 cm in short axis. There is atherosclerotic calcification of the aortic arch and thoracic aorta. The thoracic aorta is  otherwise unremarkable and not dilated. Mediastinal fat planes are preserved. Cardiac chambers are unremarkable. Pericardium is normal. There are coronary artery calcifications. The main pulmonary artery has a normal diameter and is otherwise  unremarkable.     LUNGS AND PLEURA: There is bibasilar and lingular atelectasis and scarring.  0.6 cm subpleural nodule within the lateral aspect of the left lower lobe (series 3, image 69) is  unchanged since 10/25/2019. There is mild amount of subpleural reticulation  seen within the periphery of the bilateral upper and lower lobes which are nonspecific but can be seen with early fibrotic changes.  This is most pronounced within the bilateral lower lobes.  Mild interlobular septal thickening within the bilateral lower   lobes.  Findings are unchanged since the prior exams.     CHEST WALL/BONES: There is degenerative disease of the thoracic spine. The chest wall and osseous structures are otherwise unremarkable.  Sclerotic lesion seen within the inferior endplate of T10, unchanged since the prior exams and likely a bone island  or a benign fibro-osseous lesion.     LIVER: Diffuse low attenuation seen throughout the liver compatible with fatty infiltration.  GALLBLADDER: The patient is post cholecystectomy.  BILIARY: Mild prominence of the common bile duct and intrahepatic ducts, an expected finding post cholecystectomy. No gross intra-or extrahepatic biliary ductal dilation.  SPLEEN: There is a 2.7 x 2.3 cm wedge-shaped low-attenuation lesions seen within the medial aspect of the spleen which is new since the prior exams.  PANCREAS: The pancreas enhances symmetrically. No ductal dilation.  ADRENALS: Unremarkable  KIDNEYS: The kidneys enhance symmetrically. There is no hydronephrosis.  There are a few small subcentimeter hypodense lesions seen in the kidneys bilaterally which are too small to characterize but statistically, likely represent cysts.  AORTA/VASCULAR:   There is atherosclerotic calcification of the abdominal aorta extending into bilateral iliac arteries.  RETROPERITONEUM: No mass or enlarged adenopathy.    BOWEL: There is diverticulosis involving the distal descending colon and sigmoid colon without evidence of diverticulitis. There is no dilation or wall thickening. The appendix is normal. There are no inflammatory changes within the right lower  quadrant.  Large hiatal hernia containing  majority of the stomach.  There is a chronic appearing organoaxial volvulus which appears unchanged.  MESENTERY: Normal.  No mass or hernia.    PELVIS: Postoperative changes of a prostatectomy.  Multiple surgical clips are seen within the pelvis. There are bilateral inguinal hernias containing fat. No bowel herniation. Soft tissue is seen within the right inguinal region suggestive of prior  inguinal hernia repair.  BONES:   There is degenerative disease of the thoracic and lumbar spine. Degenerative changes are seen within the sacroiliac joints and pubic symphysis. Degenerative changes are seen in the bilateral hips.               Impression   CONCLUSION:     Postoperative changes of a prostatectomy.     No metastatic disease within the chest, abdomen or pelvis.     2.7 cm wedge-shaped area of low attenuation within the medial spleen is new since the prior exams and may represent a small splenic infarct.     Large hiatal hernia containing majority of the stomach with a chronic appearing organoaxial gastric volvulus.  No dilation or wall thickening is seen to suggest an acute strangulation.     Hepatic steatosis.     Diverticulosis without diverticulitis.     Mild bilateral subpleural reticulation suggestive of early fibrotic changes, unchanged. No honeycombing.     0.6 cm subpleural nodule within the left lower lobe is unchanged since the prior exams.          Other iron deficiency anemia Check blood 9-24.        Hypomagnesemia Resolved.        Hypogammaglobulinemia (HCC) Check blood.        Mixed hyperlipidemia Check blood.        Hyperkalemia Resolved.        Hypercalcemia Check blood.        Abnormal TSH Stable.        Abnormal CT of the abdomen       Stage 3a chronic kidney disease (HCC) No motrin, ibuprofen, advil, alleve, naprosyn  with these medications.         Elevated PTHrP level       Essential hypertension Stable. Careful with diet and excercise at least 30 minutes 3-4 times a week. Check blood  pressures at different times on different days. Can purchase own blood pressure monitor. If not, check at local pharmacy. Bake foods more and grill occasionally. Avoid fried foods. No salt. Use other seasonings.         Encounter for annual health examination Check urine.        Chronic right-sided low back pain, unspecified whether sciatica present Reviewed CT from ER 2-24. OA. Hold meds. Hold PT. reviewed CAT scan done recently of the abdomen myself..        Orders Placed This Encounter   Procedures    Lipid Panel    URINALYSIS, AUTO, W/O SCOPE       Meds This Visit:  Requested Prescriptions      No prescriptions requested or ordered in this encounter       Imaging & Referrals:  GASTRO - INTERNAL      RTC 6 months for FU DM.     1. Type 2 diabetes mellitus without complication, without long-term current use of insulin (HCC) (Primary)  -     URINALYSIS, AUTO, W/O SCOPE  2. Pulmonary nodule  Overview:  Incidental lung nodule, > 3mm and < 8mm0.6 cm subpleural nodule within the left lower lobe is unchanged since the prior exams.   --Stable since 2018 and therefore do not recommend biopsy or further w/u as likely benign given stability over the past 4 yrs.      3. Other iron deficiency anemia  Overview:  Other iron deficiency anemia  --Completed GI w/u that was negative.  --iron deficiency has resolved.    --No further need for iron, folic acid or vitamin C supplement.  . He also is taking iron pills due to iron deficiency in 2020 and colonoscopy negative.  Last EGD in 2013 with a hiatal hernia, stricture and Alonso erosions.  Orders:  -     Gastro Referral - In Network  4. Hypomagnesemia  5. Hypogammaglobulinemia (HCC)  6. Mixed hyperlipidemia  -     Lipid Panel; Future; Expected date: 06/15/2024  7. Hyperkalemia  8. Hypercalcemia  Overview:  mild hypercalcemia with mildly elevated peptide related peptide.  His highest calcium level has been up to 10.9 which was back on 1130 of 2021.  This was initially noted on  11/24/2020.  Prior to that his calcium levels were normal, and in fact he had hypocalcemia back in 2018.  His albumin has remained normal, he does have chronic kidney disease stage III.  He has had CBCs which have been normal.  Patient's work-up included serum protein electrophoresis which did not show any M spike, I did show decreased immunoglobulins.  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  He did have a light chains which showed slightly elevated kappa light chain, with normal ratio, but in the setting of CKD.  Patient follows with endocrinology.  He was requested to meet with medical oncology due to the mild hypercalcemia.  Patient had Bence-Lester protein in the urine evaluated back in 2014 which was negative.Patient with initial hypocalcemia, on supplemental calcium with subsequent hypercalcemia. Patient has discontinued the calcium supplement since December and has not had repeat levels since.  Recommend to repeat as per order from endocrine and PCP.   --Elevated PTH rp but on imaging, there is no evidence of malignancy or bone lesions.  Alk phos is normal.  SPEP is also negative.   --He does have mild CKD and maybe contributing to PTH rp elevation.  This has contributed to elevation of kappa light chain.    1/24/2022 as part of the work-up to look for occult malignancy, the patient underwent CT scan of the chest, abdomen and pelvis which revealed no evidence of a primary cancer nor metastatic disease in the chest, abdomen, or pelvis.  The postoperative changes secondary to prostatectomy.  He also had a 2.7 cm wedge-shaped area of low attenuation in the medial spleen which was felt to be new since prior exam and may represent small splenic infarct.  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  Patient had hypercoagulable work-up at that time for acquired thrombophilia, given his age at the time it was not felt that a work-up for hereditary  thrombophilia be performed, and he did not have family history either.  Anticardiolipin antibodies were performed at the time which were negative.  9. Abnormal TSH  10. Abnormal CT of the abdomen  Overview:     1/24/2022 as part of the work-up to look for occult malignancy, the patient underwent CT scan of the chest, abdomen and pelvis which revealed no evidence of a primary cancer nor metastatic disease in the chest, abdomen, or pelvis.  The postoperative changes secondary to prostatectomy.  He also had a 2.7 cm wedge-shaped area of low attenuation in the medial spleen which was felt to be new since prior exam and may represent small splenic infarct.  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  Patient had hypercoagulable work-up at that time for acquired thrombophilia, given his age at the time it was not felt that a work-up for hereditary thrombophilia be performed, and he did not have family history either.  Anticardiolipin antibodies were performed at the time which were negative. Recent CT scan with a 2.7 x 2.3 cm wedge-shaped low-attenuation lesions seen within the medial aspect of the spleen which is new since the prior exams and a 0.6 cm subpleural nodule within the left lower lobe is unchanged since the prior exams, going back to 2018. Abnormal CT of the abdomen  --Prior h/o wedge shaped areas in the spleen, felt to be c/w infarcts.  These had entirely resolved and now has a new one. Unclear as how long this has been present.  --Asymptomatic.  --No recommendation for anticoagulation  11. Stage 3a chronic kidney disease (HCC)  -     URINALYSIS, AUTO, W/O SCOPE  12. Elevated PTHrP level  Overview:  Patient here for evaluation of mild hypercalcemia with mildly elevated peptide related peptide.  His highest calcium level has been up to 10.9 which was back on 1130 of 2021.  This was initially noted on 11/24/2020.  Prior to that his calcium levels were normal, and  in fact he had hypocalcemia back in 2018.  His albumin has remained normal, he does have chronic kidney disease stage III.  He has had CBCs which have been normal.  Patient's work-up included serum protein electrophoresis which did not show any M spike, I did show decreased immunoglobulins.  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  He did have a light chains which showed slightly elevated kappa light chain, with normal ratio, but in the setting of CKD.  Patient follows with endocrinology.  He was requested to meet with medical oncology due to the mild hypercalcemia.  Patient had Bence-Lester protein in the urine evaluated back in 2014 which was negative.     13. Essential hypertension  14. Encounter for annual health examination  15. Chronic right-sided low back pain, unspecified whether sciatica present      The patient indicates understanding of these issues and agrees to the plan.  Consult ordered.  Further testing ordered.  Lab work ordered.  Reinforced healthy diet, lifestyle, and exercise.      Return in about 6 months (around 12/13/2024), or if symptoms worsen or fail to improve.    Advanced Directives:   He does have a Living Will but we do NOT have it on file in Epic.    He does have a POA but we do NOT have it on file in Epic.    Discussed Advance Care Planning with patient (and family/surrogate if present). Standard forms made available to patient in After Visit Summary.  16+ minutes was spent with all Advanced Care Planning elements today (up to 30 minutes for CPT 11522)    MD Israel Hugo's SCREENING SCHEDULE   Tests on this list are recommended by your physician but may not be covered, or covered at this frequency, by your insurer.   Please check with your insurance carrier before scheduling to verify coverage.   PREVENTATIVE SERVICES FREQUENCY &  COVERAGE DETAILS LAST COMPLETION DATE   Diabetes Screening    Fasting Blood Sugar / Glucose    One  screening every 12 months if never tested or if previously tested but not diagnosed with pre-diabetes   One screening every 6 months if diagnosed with pre-diabetes Lab Results   Component Value Date     (H) 02/25/2024        Cardiovascular Disease Screening    Lipid Panel  Cholesterol  Lipoprotein (HDL)  Triglycerides Covered every 5 years for all Medicare beneficiaries without apparent signs or symptoms of cardiovascular disease Lab Results   Component Value Date    CHOLEST 96 06/27/2023    HDL 39 (L) 06/27/2023    LDL 32 06/27/2023    LDLD 57 06/24/2022    TRIG 143 06/27/2023         Electrocardiogram (EKG)   Covered if needed at Welcome to Medicare, and non-screening if indicated for medical reasons 11/30/2020      Ultrasound Screening for Abdominal Aortic Aneurysm (AAA) Covered once in a lifetime for one of the following risk factors    Men who are 65-75 years old and have ever smoked    Anyone with a family history -     Colorectal Cancer Screening  Covered for ages 50-85; only need ONE of the following:    Colonoscopy   Covered every 10 years    Covered every 2 years if patient is at high risk or previous colonoscopy was abnormal 03/04/2020    No recommendations at this time    Flexible Sigmoidoscopy   Covered every 4 years -    Fecal Occult Blood Test Covered annually -   Prostate Cancer Screening    Prostate-Specific Antigen (PSA) Annually Lab Results   Component Value Date    PSA <0.01 03/04/2022     There are no preventive care reminders to display for this patient.   Immunizations    Influenza Covered once per flu season  Please get every year 12/14/2023  No recommendations at this time    Pneumococcal Each vaccine ([id number] & [id number]) covered once after 65 Prevnar 13: 11/24/2015    [id number]: 11/24/2020     No recommendations at this time    Hepatitis B One screening covered for patients with certain risk factors   -  No recommendations at this time    Tetanus Toxoid Not covered by Medicare  Part B unless medically necessary (cut with metal); may be covered with your pharmacy prescription benefits -    Tetanus, Diptheria and Pertusis TD and TDaP Not covered by Medicare Part B -  No recommendations at this time    Zoster Not covered by Medicare Part B; may be covered with your pharmacy  prescription benefits -  No recommendations at this time     Diabetes      Hemoglobin A1C Annually; if last result is elevated, may be asked to retest more frequently.    Medicare covers every 3 months Lab Results   Component Value Date     (H) 06/03/2024    A1C 7.7 (H) 06/03/2024       No recommendations at this time    Creat/alb ratio Annually Lab Results   Component Value Date    MICROALBCREA 3.6 12/14/2023       LDL Annually Lab Results   Component Value Date    LDL 32 06/27/2023       Dilated Eye Exam Annually Last Diabetic Eye Exam:  Last Dilated Eye Exam: 09/12/23  Eye Exam shows Diabetic Retinopathy?: No       Annual Monitoring of Persistent Medications (ACE/ARB, digoxin diuretics, anticonvulsants)    Potassium Annually Lab Results   Component Value Date    K 4.7 02/25/2024         Creatinine   Annually Lab Results   Component Value Date    CREATSERUM 1.30 02/25/2024         BUN Annually Lab Results   Component Value Date    BUN 19 02/25/2024       Drug Serum Conc Annually No results found for: \"DIGOXIN\", \"DIG\", \"VALP\"           Chronic Obstructive Pulmonary Disease (COPD)    Spirometry Annually Spirometry date:             Understanding Advance Care Planning  Advance care planning is the process of deciding one’s own future medical care. It helps assure that if you can’t speak for yourself, your wishes can still be carried out. The plan is a series of legal documents that note a person’s wishes. The documents vary by state. Advance care planning should be discussed at a regular office visit with your primary care provider before an acute illness. Advance care planning is encouraged when a person has a  serious illness that is expected to get worse. It may also be done before major surgery. And it can help you and your family be prepared in case of a major illness or injury. Advance care planning helps with making decisions at these times.     Who will speak on your behalf?  A healthcare proxy is a person who acts as the voice of a patient when the patient can’t speak for himself or herself. The name of this role varies by state. It may be called a Durable Medical Power of  or Durable Power of  for Healthcare. It may be called an agent, surrogate, or advocate. Or it may be called a representative or decision maker. It's an official duty that is identified by a legal document. The document also varies by state.   Why is advance care planning important?   If a person communicates his or her healthcare wishes:   He or she will be given medical care that matches his or her values and goals.  Family members will not be forced to make decisions in a crisis with no guidance.  Creating a plan  Making an advance care plan is often done in 3 steps:   Thinking about one’s wishes. To create an advance care plan, think about what kind of medical treatment you would want if you lose the ability to communicate. Are there any situations in which you would refuse or stop treatment? Are there therapies you would want or not want? And whom do you want to make decisions for you? There are many places to learn more about how to plan for your care. Ask your healthcare provider or  for resources.  Picking a healthcare proxy. This means choosing a trusted person to speak for you only when you can’t speak for yourself. When you can't make medical decisions, your proxy makes sure the instructions in your advance care plan are followed. A proxy doesn't make decisions based on his or her own opinions. They must put aside those opinions and values if needed, and carry out your wishes.  Filling out the legal  documents. There are several kinds of legal documents for advance care planning. Each one tells healthcare providers your wishes. The documents may vary by state. They must be signed and may need to be witnessed or notarized. You can cancel or change them whenever you wish. Depending on your state, the documents may include a Healthcare Proxy form, Living Will, Durable Medical Power of , and Advance Directive.  The family’s role  The best help a family can give is to support their loved one’s wishes. Open and honest communication is vital. Family should express any concerns they have about the patient’s choices while the patient can still make decisions in the event that his or her illness prevents communicating those wishes at a later time.    [address] last reviewed this educational content on 12/1/2019    © 6952-8303 [employer]. All rights reserved. This information is not intended as a substitute for professional medical care. Always follow your healthcare professional's instructions.          Advance Medical Directive  An advance medical directive is a form that lets you plan ahead for the care you’d want if you could no longer express your wishes. This statement outlines the medical treatment you’d want or names the person you’d wish to make healthcare decisions for you. Be aware that laws vary from state to state, and it may be worthwhile to talk with an .     Writing down your wishes  An advance directive is important whether you’re young or old. Injury or illness can strike at any age.  Decide what is important to you and the kind of treatment you’d want, or not want to have.  Some states allow only one kind of advance directive. Some let you do both a durable power of  for healthcare and a living will. Some states put both kinds on the same form.    A durable power of  (POA) for healthcare   This form lets you name someone else that you have chosen and trust to  speak and make decisions on your behalf.  This person can decide on treatment for you only when you can’t speak for yourself.  You don't need to be at the end of your life. They could speak for you if you were in a coma but were likely to recover.    A living will  This form lets you list the care you want at the end of your life.  A living will applies only if you won’t live without medical treatment. It would apply if you had advanced cancer, a massive stroke, or other serious illness from which you will not recover.  It takes effect only when you can no longer express your wishes yourself.    [employer] last reviewed this educational content on 2/1/2021 © 2000-2021 [employer]. All rights reserved. This information is not intended as a substitute for professional medical care. Always follow your healthcare professional's instructions.          Choosing an Agent  A durable power of  for healthcare is only as good as the person you name to be your agent. Your agent is the person you have chosen to speak and make decisions on your behalf. If this person knows your treatment wishes and is willing to carry them out, you’ll probably be well represented. Be sure to tell your agent what’s important to you.     Who to choose  Here are suggestions for choosing an agent:  You can name a family member, close friend, , , or rabbi.  You should name one person as your agent. Then name one or two alternates. You need a backup person in case your first choice can’t be reached when needed.  Talk with each person you're thinking of naming as your agent or alternate. Do this before you decide who should carry out your wishes.  Your agent should be ...  A competent adult, age 18 or older  Someone you trust and can talk to about the care you want and what's important to you  Someone who supports your treatment choices    In many states, your agent can’t be ...   Your healthcare provider  An  employee of your provider or of a hospital, nursing home, or hospice program where you receive care  Some states have other restrictions on who can be named as an agent for an advance directive.   Be prepared  Tip: It's a good idea to write down your wishes and give a copy to your agent and all others who are involved with your healthcare.   [employer] last reviewed this educational content on 8/1/2021    © 3343-5635 [employer]. All rights reserved. This information is not intended as a substitute for professional medical care. Always follow your healthcare professional's instructions.          Understanding DNR Orders  Do not resuscitate (DNR) orders tell hospital staff not to do potentially life-restoring measures, such as CPR, if you or your loved one's heart and lungs stop working. It allows a natural death, and prevents healthcare staff from artificially reviving a person and placing them on life support. In many states, a DNR order also applies to staff outside the hospital such as in nursing homes and emergency medical services. A DNR order must be written by a healthcare provider. Or in some cases, certain other healthcare workers write it. This can only be done with the person’s or family’s consent. If a person has not written an advance directive, their family will decide on a DNR with the help of the healthcare team.   The person can cancel a DNR order at any time. The healthcare team can answer questions about the DNR form. Have copies of a DNR form are readily available so that your wishes can be faithfully followed.   Writing a DNR order  When might a DNR order be written? When the person’s health condition is such that, in the case of cardiac arrest, CPR and other resuscitation methods are not desired. This could be because the chance of successful resuscitation is very low. Or it could be because the care plan now focuses on comfort measures instead of life-sustaining measures. Coma and  terminal illness are instances when a DNR order might be used.   Irreversible coma  In a coma, a person does not respond to sight, sound, or touch. The heart and lungs could be working, but brain function is damaged due to trauma or disease.   Terminal illness  In the last stages of heart disease, AIDS, cancer, and other illnesses, some people don’t want to prolong their suffering. If recovery isn’t likely and quality of life is poor or getting worse, a person or their family may agree to a DNR order.   DNR orders and hospice care  A hospice program can offer care during the final weeks of life. Hospice programs provide dignity, pain control and comfort care in the home or at special facilities. Hospice does not provide aggressive treatment. In fact, a DNR order will likely be discussed before a person is admitted to hospice. A  or  may be able to help you arrange for hospice support.   Documenting end-of-life wishes  In addition to DNR orders, a person with a serious, life-limiting illness may wish to document their treatment wishes. This is called a POST form or by different names, depending on the state. It's meant to provide a portable medical order to help guide healthcare providers on the specific medical treatments a person wants during a medical emergency. It's meant to complement a DNR order, not replace it. Your healthcare provider can tell you more and help you complete the forms. The form may be called one of these:   MOLST (medical orders for life-sustaining treatment)  POLST (physician orders for life-sustaining treatment)  MOST (medical orders for scope of treatment)  POST (physician orders for scope of treatment)  TPOPP (transportable physician orders for patient preferences)  [place] last reviewed this educational content on 7/1/2021    © 9484-6646 [employer]. All rights reserved. This information is not intended as a substitute for professional medical care.  Always follow your healthcare professional's instructions.          An Agent’s Role for Durable Power of  for Health Care   It’s impossible to know which medical treatment choices you might face in the future. What if you aren't able to make these decisions for yourself? A durable power of  for healthcare lets you name an agent to speak and carry out your wishes on your behalf. This happens only if you can’t express your wishes yourself.     An agent’s duty  Your agent respects your wishes in the following ways:    Your agent’s duty is to see that your wishes are followed.  If your wishes aren’t known, your agent should try to decide what you want.  Your agent’s choices come before anyone else’s wishes for you.  A durable power of  for healthcare doesn't not give your agent control over your money . Your agent also can’t be made to pay your bills.  Find out what your agent can do  Restrictions on what an agent can and can’t do vary by state. Check your state laws. In most states your agent can:   Choose or refuse life-sustaining and other medical treatment on your behalf  Consent to treatment, and then stop treatment if your condition doesn’t improve  Access and release your medical records  Request an autopsy and donate your organs, unless you’ve stated otherwise in your advance directive  Find out whether your state allows your agent to do the following:   Refuse or withdraw life-enhancing care  Refuse or stop tube feeding or other life-sustaining care--even if you haven’t stated on your advance directive that you don’t want these treatments  Order sterilization or   Roma last reviewed this educational content on 2021 [employer]. All rights reserved. This information is not intended as a substitute for professional medical care. Always follow your healthcare professional's instructions.          Being a Healthcare Proxy  A healthcare proxy is  someone who represents a person who can’t speak for themself. The name of this role varies by state. It may be called a Durable Medical Power of . It may be called a Durable Power of  for Healthcare. It may be called an agent, surrogate, or advocate. Or it may be called a representative or decision maker. It's an official duty that is noted by a legal document. The document also varies by state. The person must name you as his or her proxy on the document.      A healthcare proxy speaks for another person when he or she is not able to do so. The proxy helps make sure the person’s healthcare wishes are known and followed.     What it means to be a healthcare proxy   Your role as healthcare proxy starts when the person can’t make medical decisions. This assessment can only be made by a licensed doctor. You then make the healthcare decisions as needed. You do this by carrying out the person’s wishes. These wishes are noted in his or her advance care planning documents. These declare what kind of treatment the person wishes to have or not have. You may need to put aside your own values and opinions to carry out the person’s wishes. This may include refusing or stopping life-sustaining treatments.   Documenting end-of-life wishes   As a healthcare proxy, encourage the person to discuss his or her wishes, while they are able. They can do this with their healthcare provider and then document the wishes as a medical order. The provider can help the person complete the form. The forms are known by different names depending on the state. The form may be called one of these:     MOLST (medical orders for life-sustaining treatment)  POLST (physician orders for life-sustaining treatment)  MOST (medical orders for scope of treatment)  POST (physician orders for scope of treatment)  TPOPP (transportable physician orders for patient preferences)    The form documents the person’s wishes at the end of life. It's not  tied to a certain healthcare provider or facility. It's different than a living will. The form is an order written according to state regulations by a healthcare provider. To complete one, the person must express his or her wishes to an advanced healthcare provider. If the person can’t make his or her own decisions, then this is done by the person’s healthcare proxy.   Carrying out your role  Your duties depend on what the person’s advance care planning documents say. Your duties may also depend on state law. In general:   Before accepting a role as a proxy, talk with the person. Be sure you know his or her wishes. Ask questions. This will help you be his or her voice if and when it is needed.  Be sure that the person’s healthcare team knows that you are his or her proxy. Carry a copy of the document and proof of your identity.  Make sure the healthcare team has a copy of the advance care planning documents.  Talk to the healthcare team. Ask questions as often as you need. Stay informed about the person’s condition.  Ask for any help you need to understand the medical situation. Ask about the person’s condition and prognosis. Ask about risks and benefits of tests and treatments. Find out all the facts and options.  Speak on the person’s behalf with the healthcare team when needed.  Talk with family members and keep them informed.  Know your rights. You have the right to ask for information. You can ask for consultations and second opinions. You have the right to request or refuse treatment for the person. You may be able to review his or her medical chart. You can authorize the person’s transfer to another facility. You can also request a new healthcare provider for him or her. If you are not sure what your rights are at any time, ask a .  When it’s time to make decisions  If the person’s wishes are clear in the advance care plan documents, ask for them to be carried out as noted. If they are not clear,  talk with the healthcare team. Listen to the team’s recommendations. Talk with a  or counselor. It may be hard for you to make a decision at times. You may feel sad or upset about a decision. Being a healthcare proxy is not an easy role. But it's an important one. Remember that the person trusts you to carry out his or her wishes.   If you need help  Ask the healthcare team if you have trouble with a decision. The healthcare team will help you.  Encourage the person you are helping to have a conversation with their provider about their end-of-life wishes. The provider can help them fill out the form.  You may need help in resolving family conflicts. Ask the hospital or clinic , ethics consultant, or a  for help.  If you are having trouble talking with the healthcare team while the person is in the hospital, reach out to the patient relations department. Or ask to speak to the hospital ombudsman or ethics committee.    Roma last reviewed this educational content on 9/1/2019 © 2000-2021 [employer]. All rights reserved. This information is not intended as a substitute for professional medical care. Always follow your healthcare professional's instructions.          Life Support  If you understand how specific treatments may affect your quality of life, you can decide which ones you’d choose or refuse. You may want to talk to your healthcare provider about the possible benefits and risks of treatments. The chance of good results from these therapies varies based on each individual clinical situation and can be very hard to predict. Medical treatment, if your life is in danger, falls into 3 main categories.     Life supporting  This care keeps your heart and lungs going when they can no longer work on their own.  CPR restarts your heart and lungs if they stop working.  A respirator (or ventilator) keeps you breathing. Air is pumped into your lungs  through a tube that’s put into your windpipe.    Life sustaining  This care keeps you alive longer when you have an illness that can’t be cured.  Tube feeding or TPN (total parenteral nutrition) provides food and fluids through a tube or IV (intravenous). It is given if you can’t chew or swallow on your own.  Dialysis is a kidney machine that cleans your blood when your kidneys can no longer work on their own.    Life enhancing  This care controls pain and discomfort, such as nausea or trouble breathing. This type of care is not designed to prolong your life, but to enhance comfort and quality of life. Nothing is done to keep you alive longer.  Hospice care is comfort care. It might provide food and fluids by mouth or help with bathing. Hospice care is given during the last stages of a terminal illness.  Strong pain medicine can be given to help keep you comfortable.    Do Not Resuscitate (DNR)  Would you want CPR if your heart stops while you’re a patient in a hospital or nursing home? If not, talk to your healthcare provider about issuing a DNR (Do-Not-Resuscitate) order.   DNRs and advance directives may not apply during anesthesia, in emergency rooms, or when emergency medical teams respond to a 911 call. Ask your healthcare provider how you can make sure your wishes will be followed. Also, a DNR will not prevent you from getting other kinds of needed medical care such as treatment for pain, or bleeding.   Roma last reviewed this educational content on 8/1/2021 © 2000-2021 [employer]. All rights reserved. This information is not intended as a substitute for professional medical care. Always follow your healthcare professional's instructions.          Stopping Life-Sustaining Treatments  Certain treatments can help sustain life when you have a serious illness. But as your illness progresses, there may come a time when these treatments are no longer a benefit. Decisions must then be made  whether to continue or stop these treatments. This can be a hard task for you and your loved ones, but know that you’re not alone. Your healthcare provider and healthcare team will guide you through these treatment decisions. They will also help answer any questions you may have.     What are life-sustaining treatments?  Life-sustaining treatments help keep you alive if a vital body function fails. These treatments can include CPR and the use of machines to help with heart, lung, or kidney function. They can also include the use of tubes to deliver food, fluids, blood, and medicines to the body. Blood transfusions and antibiotics are also types of life-sustaining treatments.   What happens if life-sustaining treatments are continued?  These treatments can help extend your life. But they will not cure your illness. If you are near the end of your life, you may find it hard to handle the side effects and problems that can occur with these treatments. In this case, the treatments may be too much of a burden on your body. They may cause more harm than good. They may also disrupt the natural dying process and prolong suffering.   What happens if life-sustaining treatments are stopped?  If these treatments are stopped, the focus of treatment will shift to comfort care. This involves measures to control pain and other symptoms you may have. These measures are not meant to cure your illness or help you live longer. Instead, they are meant to improve your quality of life during the time you have left. If you are in the end stage of your illness, you may be referred to hospice by your healthcare provider. Hospice provides end-of-life care. This includes emotional, spiritual, and social support for you and your loved ones.   How do I decide whether to stop life-sustaining treatments?  Your healthcare provider and healthcare team will talk with you about the specific treatments that are part of your care plan. If you want, you  may include family and friends in these meetings. Here are some questions to think about or ask your healthcare team:   Is there is a chance that my illness will improve? Or will it continue to get worse?  What are my goals of care? Do I want to extend the time I have left? Or do I want to focus my care on comfort and managing symptoms?  How will stopping or continuing these treatments affect my health? Will they enhance my comfort and quality of life? Or will they cause more problems?  Consider your own values or kaylee. Also ask for advice from those who share your values.  How do I state my decisions about life-sustaining treatments?  Once you’ve made your decision to continue or stop specific treatments, you can tell your healthcare provider directly. It's best to also put your treatment wishes in writing with advance directives. These are legal forms related to healthcare decisions. Laws about advance directives vary from state to state. Ask your healthcare provider about what forms are needed to make sure your wishes will be followed. Some common forms include:   A durable power of  for healthcare or a healthcare proxy form.  This form lets you name a person to make treatment decisions for you when you can’t. This person is often called a healthcare proxy, medical or healthcare power of , or agent.  A living will.  This form tells others the kinds of treatment you want or don’t want if you become too ill or injured to speak for yourself.  Orders for life-sustaining treatments.  These are actual healthcare provider's orders that must be followed by other medical providers. The form belongs to you, not to the healthcare provider or hospital.). These are legal forms obtained from your healthcare provider or hospital that document your wishes. The forms are known by different names depending on the state. Common names include:  MOLST (medical orders for life-sustaining treatment)  POLST (physician  orders for life-sustaining treatment)  MOST (medical orders for scope of treatment)  POST (physician orders for scope of treatment)  TPOPP (transportable physician orders for patient preferences)     Keep in mind that you can change or cancel an advance directive at any time. Make it a practice to review your decisions each time there is a change in your health or goals of care. Also be sure to tell your healthcare proxy and loved ones of any changes in your decisions.   Deciding whether to stop life-sustaining treatments for a loved one   The decision to stop treatment ideally is made with the person’s consent. If the person is not capable of making decisions and has no advance directive, the decision falls to the person’s healthcare proxy or other adult. If you need to make a decision about stopping treatment for a loved one, start by talking to his or her healthcare provider. Review the goals of care and the benefits and burdens of specific treatments on your loved one’s health. Also think about your loved one’s wishes and values. If needed, seek advice from other healthcare team members, like a  or .   Roma last reviewed this educational content on 12/1/2019    © 7384-7947 [employer]. All rights reserved. This information is not intended as a substitute for professional medical care. Always follow your healthcare professional's instructions.

## 2024-06-13 NOTE — PATIENT INSTRUCTIONS
ASSESSMENT/PLAN:     Encounter Diagnoses   Name Primary?    Type 2 diabetes mellitus without complication, without long-term current use of insulin (HCC) Stable. Careful with diet and excercise at least 30 minutes 3-4 times a week. Check sugars at different times on different dates. Careful with low sugars. Carry something with you and check sugar if can. Can carry ye cracker, etc. Decrease carbohydrates. But also, careful with fruits and natural sugars.One serving a day and no more than 1 handful every day. Check feet  every AM and careful with sores and ulcers on feet bilaterally. Check eyes every year with dilated eye exam.  Check sugars.  2-hour postmeal should be less than 140s.  Pre-meal should be 's.  Both equally affected A1c.  Discussed importance of glycemic control to prevent complications of diabetes  -Discussed complications of diabetes include retinopathy, neuropathy, nephropathy and cardiovascular disease  -Discussed ABCs of DM  -Discussed importance of SBGM  -Discussed importance of low CHO diet, recommend 45gm per meal or 135gm per day  -Normal foot exam  -UTD with optho, Follow up 9-24.   -Normotensive    Yes    Pulmonary nodule  Narrative   PROCEDURE: CT CHEST ABDOMEN PELVIS (ALL CONTRAST ONLY) (CPT=71260/69566)     COMPARISON: [facility], CT UROGRAM (W+WO)  (CPT=74178), 8/08/2018, 2:43 PM.  [facility], CT ABDOMEN + PELVIS KIDNEYSTONE 2D RNDR (NO IV NO ORAL) (CPT=741, 10/25/2019, 10:20 AM.  [facility], CT ABDOMEN +  PELVIS KIDNEYSTONE 2D RNDR (NO IV NO ORAL) (CPT=741, 9/20/2018, 1:39 AM.     INDICATIONS: History of prostate cancer and hypercalcemia.     TECHNIQUE:   CT images of the chest, abdomen and pelvis were obtained with intravenous contrast material.  Automated exposure control for dose reduction was used. Adjustment of the mA and/or kV was done based on the patient's size. Use of iterative  reconstruction technique for dose  reduction was used.  Dose information is transmitted to the ACR (American College of Radiology) NRDR (National Radiology Data Registry) which includes the Dose Index Registry.     FINDINGS:  LINES AND TUBES: None.     MEDIASTINUM/VASCULATURE: There are multiple mildly prominent mediastinal lymph nodes which are nonspecific and measure less than 1 cm in short axis. There is atherosclerotic calcification of the aortic arch and thoracic aorta. The thoracic aorta is  otherwise unremarkable and not dilated. Mediastinal fat planes are preserved. Cardiac chambers are unremarkable. Pericardium is normal. There are coronary artery calcifications. The main pulmonary artery has a normal diameter and is otherwise  unremarkable.     LUNGS AND PLEURA: There is bibasilar and lingular atelectasis and scarring.  0.6 cm subpleural nodule within the lateral aspect of the left lower lobe (series 3, image 69) is unchanged since 10/25/2019. There is mild amount of subpleural reticulation  seen within the periphery of the bilateral upper and lower lobes which are nonspecific but can be seen with early fibrotic changes.  This is most pronounced within the bilateral lower lobes.  Mild interlobular septal thickening within the bilateral lower   lobes.  Findings are unchanged since the prior exams.     CHEST WALL/BONES: There is degenerative disease of the thoracic spine. The chest wall and osseous structures are otherwise unremarkable.  Sclerotic lesion seen within the inferior endplate of T10, unchanged since the prior exams and likely a bone island  or a benign fibro-osseous lesion.     LIVER: Diffuse low attenuation seen throughout the liver compatible with fatty infiltration.  GALLBLADDER: The patient is post cholecystectomy.  BILIARY: Mild prominence of the common bile duct and intrahepatic ducts, an expected finding post cholecystectomy. No gross intra-or extrahepatic biliary ductal dilation.  SPLEEN: There is a 2.7 x 2.3 cm  wedge-shaped low-attenuation lesions seen within the medial aspect of the spleen which is new since the prior exams.  PANCREAS: The pancreas enhances symmetrically. No ductal dilation.  ADRENALS: Unremarkable  KIDNEYS: The kidneys enhance symmetrically. There is no hydronephrosis.  There are a few small subcentimeter hypodense lesions seen in the kidneys bilaterally which are too small to characterize but statistically, likely represent cysts.  AORTA/VASCULAR:   There is atherosclerotic calcification of the abdominal aorta extending into bilateral iliac arteries.  RETROPERITONEUM: No mass or enlarged adenopathy.    BOWEL: There is diverticulosis involving the distal descending colon and sigmoid colon without evidence of diverticulitis. There is no dilation or wall thickening. The appendix is normal. There are no inflammatory changes within the right lower  quadrant.  Large hiatal hernia containing majority of the stomach.  There is a chronic appearing organoaxial volvulus which appears unchanged.  MESENTERY: Normal.  No mass or hernia.    PELVIS: Postoperative changes of a prostatectomy.  Multiple surgical clips are seen within the pelvis. There are bilateral inguinal hernias containing fat. No bowel herniation. Soft tissue is seen within the right inguinal region suggestive of prior  inguinal hernia repair.  BONES:   There is degenerative disease of the thoracic and lumbar spine. Degenerative changes are seen within the sacroiliac joints and pubic symphysis. Degenerative changes are seen in the bilateral hips.               Impression   CONCLUSION:     Postoperative changes of a prostatectomy.     No metastatic disease within the chest, abdomen or pelvis.     2.7 cm wedge-shaped area of low attenuation within the medial spleen is new since the prior exams and may represent a small splenic infarct.     Large hiatal hernia containing majority of the stomach with a chronic appearing organoaxial gastric volvulus.  No  dilation or wall thickening is seen to suggest an acute strangulation.     Hepatic steatosis.     Diverticulosis without diverticulitis.     Mild bilateral subpleural reticulation suggestive of early fibrotic changes, unchanged. No honeycombing.     0.6 cm subpleural nodule within the left lower lobe is unchanged since the prior exams.          Other iron deficiency anemia Check blood 9-24.        Hypomagnesemia Resolved.        Hypogammaglobulinemia (HCC) Check blood.        Mixed hyperlipidemia Check blood.        Hyperkalemia Resolved.        Hypercalcemia Check blood.        Abnormal TSH Stable.        Abnormal CT of the abdomen       Stage 3a chronic kidney disease (HCC) No motrin, ibuprofen, advil, alleve, naprosyn  with these medications.         Elevated PTHrP level       Essential hypertension Stable. Careful with diet and excercise at least 30 minutes 3-4 times a week. Check blood pressures at different times on different days. Can purchase own blood pressure monitor. If not, check at local pharmacy. Bake foods more and grill occasionally. Avoid fried foods. No salt. Use other seasonings.         Encounter for annual health examination Check urine.        Chronic right-sided low back pain, unspecified whether sciatica present Reviewed CT from ER 2-24. OA. Hold meds. Hold PT.        General Neck and Back Pain    Both neck and back pain are usually caused by injury to the muscles or ligaments of the spine. Sometimes the disks that separate each bone of the spine may cause pain by pressing on a nearby nerve. Back and neck pain may appear after a sudden twisting or bending force (such as in a car accident), or sometimes after a simple awkward movement. In either case, muscle spasm is often present and adds to the pain.  Acute neck and back pain usually gets better in 1 to 2 weeks. Pain related to disk disease, arthritis in the spinal joints or spinal stenosis (narrowing of the spinal canal) can become chronic  and last for months or years.  Back and neck pain are common problems. Most people feel better in 1 or 2 weeks, and most of the rest in 1 to 2 months. Most people can remain active.  People have and describe pain differently.  Pain can be sharp, stabbing, shooting, aching, cramping, or burning  Movement, standing, bending, lifting, sitting, or walking may worsen the pain  Pain can be localized to one spot or area, or it can be more generalized  Pain can spread or radiate upwards, downwards, to the front, or go down your arms  Muscle spasm may occur.  Most of the time mechanical problems with the muscles or spine cause the pain. it is usually caused by an injury, whether known or not, to the muscles or ligaments. While illnesses can cause back pain, it is usually not caused by a serious illness. Pain is usually related to physical activity, whether sports, exercise, work, or normal activity. Sometimes it can occur without an identifiable cause. This can happen simply by stretching or moving wrong, without noting pain at the time. Other causes include:  Overexertion, lifting, pushing, pulling incorrectly or too aggressively.  Sudden twisting, bending or stretching from an accident (car or fall), or accidental movement.  Poor posture  Poor conditioning, lack of regular exercise  Spinal disc disease or arthritis  Stress  Pregnancy, or illness like appendicitis, bladder or kidney infection, pelvic infections   Home care  For neck pain: Use a comfortable pillow that supports the head and keeps the spine in a neutral position. The position of the head should not be tilted forward or backward.  When in bed, try to find a position of comfort. A firm mattress is best. Try lying flat on your back with pillows under your knees. You can also try lying on your side with your knees bent up towards your chest and a pillow between your knees.  At first, do not try to stretch out the sore spots. If there is a strain, it is not like  the good soreness you get after exercising without an injury. In this case, stretching may make it worse.  Don't sit for long periods, as in long car rides or other travel. This puts more stress on the lower back than standing or walking.  During the first 24 to 72 hours after an injury, apply an ice pack to the painful area for 20 minutes and then remove it for 20 minutes over a period of 60 to 90 minutes or several times a day.   You can alternate ice and heat therapies. Talk with your healthcare provider about the best treatment for your back or neck pain. As a safety precaution, do not use a heating pad at bedtime. Sleeping with a heating pad can lead to skin burns or tissue damage.  Therapeutic massage can help relax the back and neck muscles without stretching them.  Be aware of safe lifting methods and do not lift anything over 15 pounds until all the pain is gone.  Medicines  Talk to your healthcare provider before using medicine, especially if you have other medical problems or are taking other medicines.  You may use over-the-counter medicine to control pain, unless another pain medicine was prescribed. If you have chronic conditions like diabetes, liver or kidney disease, stomach ulcers,  gastrointestinal bleeding, or are taking blood thinner medicines.  Be careful if you are given pain medicines, narcotics, or medicine for muscle spasm. They can cause drowsiness, and can affect your coordination, reflexes, and judgment. Do not drive or operate heavy machinery.  Follow-up care  Follow up with your healthcare provider, or as advised. Physical therapy or further tests may be needed.  If X-rays were taken, you will be notified of any new findings that may affect your care.  Call 911  Call 911 if any of the following occur:  Trouble breathing  Confusion  Very drowsy or trouble awakening  Fainting or loss of consciousness  Rapid or very slow heart rate  Loss of bowel or bladder control  When to seek medical  advice  Call your healthcare provider right away if any of these occur:  Pain becomes worse or spreads into your arms or legs  Weakness, numbness or pain in one or both arms or legs  Numbness in the groin area  Difficulty walking  Fever of 100.4ºF (38ºC) or higher, or as directed by your healthcare provider  Date Last Reviewed: 7/1/2016  © 3015-0971 [employer]. [address] [place] [address]. All rights reserved. This information is not intended as a substitute for professional medical care. Always follow your healthcare professional's instructions.        Back Care Tips    Caring for your back  These are things you can do to prevent a recurrence of acute back pain and to reduce symptoms from chronic back pain:  Stay at a healthy weight. If you are overweight, losing weight will help most types of back pain.  Exercise is an important part of recovery from most types of back pain. The muscles behind and in front of the spine support the back. This means strengthening both the back muscles and the abdominal muscles will provide better support for your spine.   Swimming and brisk walking are good overall exercises to improve your fitness level.  Practice safe lifting methods (see below).  Practice good posture when sitting, standing, and walking. Don't sit for a long time. This puts more stress on the lower back than standing or walking.  Wear quality shoes with good arch support. Foot and ankle alignment can affect back symptoms. Don't wear high heels.  Therapeutic massage can help relax the back muscles without stretching them.  During the first 24 to 72 hours after an acute injury or flare-up of chronic back pain, put an ice pack on the painful area for 20 minutes and then remove it for 20 minutes. Do thisover a period of 60 to 90 minutes, or several times a day. As a safety precaution, don't use a heating pad at bedtime. Sleeping on a heating pad can lead to skin burns or tissue  damage.  You can alternate using ice and heat.  Medicines  Talk with your healthcare provider before using medicines, especially if you have other health problems or are taking other medicines.  You may use over-the-counter medicines, such as acetaminophen, ibuprofen, or naprosyn to control pain, unless your healthcare provider prescribed other pain medicine. Talk with your healthcare provider before taking any medicines if you have a chronic condition such ass diabetes, liver or kidney disease, stomach ulcers, or digestive bleeding, or are taking blood thinners.  Be careful if you are given prescription pain medicines, opioids, or medicine for muscle spasm. They can cause drowsiness, and affect your coordination, reflexes, and judgment. Don't drive or operate heavy machinery while taking these types of medicines. Take prescription pain medicine only as prescribed by your healthcare provider.  Lumbar stretch  This simple stretch will help relax muscle spasm and keep your back more limber. If exercise makes your back pain worse, don’t do it.  Lie on your back with your knees bent and both feet on the ground.  Slowly raise your left knee to your chest as you flatten your lower back against the floor. Hold for 5 seconds.  Relax and repeat the exercise with your right knee.  Do 10 of these exercises for each leg.  Safe lifting method  Don’t bend over at the waist to lift an object off the floor.  Instead, bend your knees and hips in a squat.   Keep your back and head upright  Hold the object close to your body, directly in front of you.  Straighten your legs to lift the object.   Lower the object to the floor in the reverse fashion.  If you must slide something across the floor, push it.    Posture tips  Sitting  Sit in chairs with straight backs or low-back support. Keep your knees lower than your hips, with your feet flat on the floor.  When driving, sit up straight. Adjust the seat forward so you are not leaning  toward the steering wheel.  A small pillow or rolled towel behind your lower back may help if you are driving long distances.   Standing  When standing for long periods, shift most of your weight to one leg at a time. Switch legs every few minutes.   Sleeping  The best way to sleep is on your side with your knees bent. Put a low pillow under your head to support your neck in a neutral spine position. Don't use thick pillows that bend your neck to one side. Put a pillow between your legs to further relax your lower back. If you sleep on your back, put pillows under your knees to support your legs in a slightly flexed position. Use a firm mattress. If your mattress sags, replace it, or use a 1/2-inch plywood board under the mattress to add support.  Follow-up care  Follow up with your healthcare provider, or as advised.  If X-rays, a CT scan or an MRI scan were taken, they may be reviewed by a radiologist. You will be told of any new findings that may affect your care.  Call 911  Call 911 if any of the following occur:  Trouble breathing  Confusion  Very drowsy  Fainting or loss of consciousness  Rapid or very slow heart rate  Loss of  bowel or bladder control  When to seek medical advice  Call your healthcare provider right away if any of the following occur:  Pain becomes worse or spreads to your arms or legs  Weakness or numbness in one or both arms or legs  Numbness in the groin area  Date Last Reviewed: 6/1/2016  © 8357-2375 [employer]. [address] [place] [address]. All rights reserved. This information is not intended as a substitute for professional medical care. Always follow your healthcare professional's instructions.        Exercises to Strengthen Your Lower Back  Strong lower back and abdominal muscles work together to support your spine. The exercises below will help strengthen the lower back. It is important that you begin exercising slowly and increase levels  gradually.  Always begin any exercise program with stretching. If you feel pain while doing any of these exercises, stop and talk to your doctor about a more specific exercise program that better suits your condition.   Low back stretch  The point of stretching is to make you more flexible and increase your range of motion. Stretch only as much as you are able. Stretch slowly. Do not push your stretch to the limit. If at any point you feel pain while stretching, this is your (temporary) limit.  Lie on your back with your knees bent and both feet on the ground.  Slowly raise your left knee to your chest as you flatten your lower back against the floor. Hold for 5 seconds.  Relax and repeat the exercise with your right knee.  Do 10 of these exercises for each leg.  Repeat hugging both knees to your chest at the same time.  Building lower back strength  Start your exercise routine with 10 to 30 minutes a day, 1 to 3 times a day.  Initial exercises  Lying on your back:  Ankle pumps: Move your foot up and down, towards your head, and then away. Repeat 10 times with each foot.  Heel slides: Slowly bend your knee, drawing the heel of your foot towards you. Then slide your heel/foot from you, straightening your knee. Do not lift your foot off the floor (this is not a leg lift).  Abdominal contraction: Bend your knees and put your hands on your stomach. Tighten your stomach muscles. Hold for 5 seconds, then relax. Repeat 10 times.  Straight leg raise: Bend one leg at the knee and keep the other leg straight. Tighten your stomach muscles. Slowly lift your straight leg 6 to 12 inches off the floor and hold for up to 5 seconds. Repeat 10 times on each side.  Standing:  Wall squats: Stand with your back against the wall. Move your feet about 12 inches away from the wall. Tighten your stomach muscles, and slowly bend your knees until they are at about a 45 degree angle. Do not go down too far. Hold about 5 seconds. Then slowly  return to your starting position. Repeat 10 times.  Heel raises: Stand facing the wall. Slowly raise the heels of your feet up and down, while keeping your toes on the floor. If you have trouble balancing, you can touch the wall with your hands. Repeat 10 times.  More advanced exercises  When you feel comfortable enough, try these exercises.  Kneeling lumbar extension: Begin on your hands and knees. At the same time, raise and straighten your right arm and left leg until they are parallel to the ground. Hold for 2 seconds and come back slowly to a starting position. Repeat with left arm and right leg, alternating 10 times.  Prone lumbar extension: Lie face down, arms extended overhead, palms on the floor. At the same time, raise your right arm and left leg as high as comfortably possible. Hold for 10 seconds and slowly return to start. Repeat with left arm and right leg, alternating 10 times. Gradually build up to 20 times. (Advanced: Repeat this exercise raising both arms and both legs a few inches off the floor at the same time. Hold for 5 seconds and release.)  Pelvic tilt: Lie on the floor on your back with your knees bent at 90 degrees. Your feet should be flat on the floor. Inhale, exhale, then slowly contract your abdominal muscles bringing your navel toward your spine. Let your pelvis rock back until your lower back is flat on the floor. Hold for 10 seconds while breathing smoothly.  Abdominal crunch: Perform a pelvic tilt (above) flattening your lower back against the floor. Holding the tension in your abdominal muscles, take another breath and raise your shoulder blades off the ground (this is not a full sit-up). Keep your head in line with your body (don’t bend your neck forward). Hold for 2 seconds, then slowly lower.  Date Last Reviewed: 6/1/2016  © 0502-6299 [employer]. [address] [facility], [place] [address]. All rights reserved. This information is not intended as a substitute for  professional medical care. Always follow your healthcare professional's instructions.        Back Pain (Acute or Chronic)    Back pain is one of the most common problems. The good news is that most people feel better in 1 to 2 weeks, and most of the rest in 1 to 2 months. Most people can remain active.  People who have pain describe it differently--not everyone is the same.  The pain can be sharp, stabbing, shooting, aching, cramping or burning.  Movement, standing, bending, lifting, sitting, or walking may worsen pain.  It can be localized to one spot or area, or it can be more generalized.  It can spread or radiate upwards, to the front, or go down your arms or legs (sciatica).  It can cause muscle spasm.  Most of the time, mechanical problems with the muscles or spine cause the pain. Mechanical problems are usually caused by an injury to the muscles or ligaments. While illness can cause back pain, it is usually not caused by a serious illness. Mechanical problems include:   Physical activity such as sports, exercise, work, or normal activity  Overexertion, lifting, pushing, pulling incorrectly or too aggressively  Sudden twisting, bending, or stretching from an accident, or accidental movement  Poor posture  Stretching or moving wrong, without noticing pain at the time  Poor coordination, lack of regular exercise (check with your doctor about this)  Spinal disc disease or arthritis  Stress  Pain can also be related to pregnancy, or illness like appendicitis, bladder or kidney infections, pelvic infections, and many other things.  Acute back pain usually gets better in 1 to 2 weeks. Back pain related to disk disease, arthritis in the spinal joints or spinal stenosis (narrowing of the spinal canal) can become chronic and last for months or years.  Unless you had a physical injury (for example, a car accident or fall) X-rays are usually not needed for the initial evaluation of back pain. If pain continues and does  not respond to medical treatment, X-rays and other tests may be needed.  Home care  Try these home care recommendations:  When in bed, try to find a position of comfort. A firm mattress is best. Try lying flat on your back with pillows under your knees. You can also try lying on your side with your knees bent up towards your chest and a pillow between your knees.  At first, do not try to stretch out the sore spots. If there is a strain, it is not like the good soreness you get after exercising without an injury. In this case, stretching may make it worse.  Don't sit for long periods, as in a long car ride or during other travel. This puts more stress on the lower back than standing or walking.  During the first 24 to 72 hours after an acute injury or flare up of chronic back pain, apply an ice pack to the painful area for 20 minutes and then remove it for 20 minutes. Do this over a period of 60 to 90 minutes or several times a day. This will reduce swelling and pain. Wrap the ice pack in a thin towel or plastic to protect your skin.  You can start with ice, then switch to heat. Heat (hot shower, hot bath, or heating pad) reduces pain and works well for muscle spasms. Heat can be applied to the painful area for 20 minutes then remove it for 20 minutes. Do this over a period of 60 to 90 minutes or several times a day. Do not sleep on a heating pad. It can lead to skin burns or tissue damage.  You can alternate ice and heat therapy. Talk with your doctor about the best treatment for your back pain.  Therapeutic massage can help relax the back muscles without stretching them.  Be aware of safe lifting methods and do not lift anything without stretching first.  Medicines  Talk to your doctor before using medicine, especially if you have other medical problems or are taking other medicines.  You may use over-the-counter medicine as directed on the bottle to control pain, unless another pain medicine was prescribed. If you  have chronic conditions like diabetes, liver or kidney disease, stomach ulcers, or gastrointestinal bleeding, or are taking blood thinners, talk to your doctor before taking any medicine.  Be careful if you are given a prescription medicines, narcotics, or medicine for muscle spasms. They can cause drowsiness, affect your coordination, reflexes, and judgement. Do not drive or operate heavy machinery.  Follow-up care  Follow up with your healthcare provider, or as advised.   A radiologist will review any X-rays that were taken. Your provide will notify you of any new findings that may affect your care.  Call 911  Call 911 if any of the following occur:  Trouble breathing  Confusion  Very drowsy or trouble awakening  Fainting or loss of consciousness  Rapid or very slow heart rate  Loss of bowel or bladder control  When to seek medical advice  Call your healthcare provider right away if any of these occur:   Pain becomes worse or spreads to your legs  Weakness or numbness in one or both legs  Numbness in the groin or genital area  Date Last Reviewed: 7/1/2016  © 8212-3764 [employer]. [address] [place] [address]. All rights reserved. This information is not intended as a substitute for professional medical care. Always follow your healthcare professional's instructions.        Self-Care for Low Back Pain    Most people have low back pain now and then. In many cases, it isn’t serious and self-care can help. Sometimes low back pain can be a sign of a bigger problem. Call your healthcare provider if your pain returns often or gets worse over time. For the long-term care of your back, get regular exercise, lose any excess weight and learn good posture.  Take a short rest  Lying down during the day may be helpful for short periods of time if severe pain increases with sitting or standing. Long-term bed rest could be damaging.  Reduce pain and swelling  Cold reduces swelling. Both cold and heat  can reduce pain. Protect your skin by placing a towel between your body and the ice or heat source.  For the first few days, apply an ice pack for 15 to 20 minutes, several times a day. To make a cold pack, put ice cubes in a plastic bag that seals at the top.  After the first few days, try heat for 15 minutes at a time to ease pain. Never sleep on a heating pad.  Over-the-counter medicine can help control pain and swelling. Try aspirin or a non-steroidal anti-inflammatory medicine (NSAID) such as ibuprofen.  Exercise  Exercise can help your back heal. It also helps your back get stronger and more flexible, preventing any reinjury. Ask your healthcare provider about specific exercises for your back.  Use good posture to avoid reinjury  When moving, bend at the hips and knees. Don’t bend at the waist or twist around.  When lifting, keep the object close to your body. Lift heavy items using your legs, not your back. Don’t try to lift more than you can handle.  When sitting, keep your lower back supported. Use a rolled-up towel as needed.    Seek medical care right away if:  You can't stand or walk.  You have a temperature over 100.4°F (38.0°C)  You have frequent, painful, or bloody urination.  You have severe abdominal pain.  You have a sharp, stabbing pain.  Your pain is constant.  You have pain, tingling, or numbness in your leg.  You feel pain in a new area of your back.  You notice that the pain isn’t decreasing after more than a week.  Date Last Reviewed: 3/1/2018  © 6682-7283 [employer]. [address] [place] [address], [place] [address]. All rights reserved. This information is not intended as a substitute for professional medical care. Always follow your healthcare professional's instructions.        Relieving Back Pain  Back pain is a common problem. You can strain back muscles by lifting too much weight or just by moving the wrong way. Back strain can be uncomfortable, even painful. And it can take  weeks or months to improve. To help yourself feel better and prevent future back strains, try these tips.  Important: Don't give aspirin to children or teens without first discussing it with your child's healthcare provider.  Ice    Ice reduces muscle pain and swelling. It helps most during the first 24 to 48 hours after an injury.  Wrap an ice pack or a bag of frozen peas in a thin towel. Never put ice directly on your skin.  Place the ice where your back hurts the most.  Don’t ice for more than 20 minutes at a time.  You can use ice several times a day.  Medicines  Over-the-counter pain relievers include acetaminophen and anti-inflammatory medicines, which includes aspirin, naproxen, or ibuprofen. They can help ease discomfort. Some also reduce swelling.  Tell your healthcare provider about any medicines you are already taking.  Take medicines only as directed.  Manipulation and massage  Having manipulation by an osteopathic doctor or chiropractor may be helpful. Getting a massage also may help.   Heat  After the first 48 hours, heat can relax sore muscles and improve blood flow.  Try a warm bath or shower. Or use a heating pad set on low. To prevent a burn, keep a cloth between you and the heating pad.  Don’t use a heating pad for more than 15 minutes at a time. Never sleep on a heating pad.  Date Last Reviewed: 6/1/2018  © 8689-2056 [employer]. [address] [place] [address]. All rights reserved. This information is not intended as a substitute for professional medical care. Always follow your healthcare professional's instructions.        Back Safety for Healthcare Workers     Bend at your knees and hips when bending down.   Whether you’re moving a patient, lifting a box of supplies, or pushing a cart or wheelchair, your back is always working. Use the tips below to help you reduce your risk of back injury.  Reaching  Reaching for records, files, or supplies, especially in high places,  can strain your back:  Reach only as high as your shoulders.  Use a stool or stepladder if you need to get closer to the load.  Test the weight of the load by pushing up on a corner before lifting. If it’s too heavy, get help.  Bending and lifting  When you’re bending down to reach or lift, move your whole body to protect your back:  Bend your knees and hips, not your back. Don't bend or lift if you are off balance or if your back is twisted.  Kneel down on 1 knee, if necessary.  Get as close to the object as you can, so you won’t have to reach with your arms.  Keep the load close to your body. “Hug” it unless there is a chance it may contain sharps.  Tighten your stomach muscles to support your back when you lift.  Lift with your legs, not your back.  Maintain a wide base of support. Keep feet shoulder-width apart, or one foot slightly in front of the other.  Pushing and pulling  Pulling larger objects can be as hard on your back as lifting. Whenever possible, push instead:  Push with both arms, keeping your elbows bent.  Stay close to the load, without leaning forward.  Tighten your stomach muscles as you push.  Date Last Reviewed: 3/1/2018  © 8764-4510 [employer]. [address] [place] [address], [place] [address]. All rights reserved. This information is not intended as a substitute for professional medical care. Always follow your healthcare professional's instructions.        Caring for Your Back Throughout the Day    Take care of your back throughout the day. You will likely have fewer back problems if you do. Try to warm up before you move. Shift positions often. Also do your best to form healthy habits.  Warm up for the day  Do a few slow, catlike stretches before starting your day. This simple warmup can soften your disks, stretch your back muscles, and help prevent injuries.  Shift positions often  At work and at home, change positions often. This helps keep your body from getting stiff. Stand up or  lean back while you sit. If you can, get up and move every 1/2 hour.  Form healthy habits  Here are some suggestions:   Keep a healthy weight. When you weigh too much, your back is under excess strain. But losing just a few extra pounds can help a lot.  Try not to overeat. Learn about serving sizes. The size of a serving depends on the food and the food group. Many foods list serving sizes on the labels.  Handle minor aches with cold and heat. Apply cold the first 24 to 48 hours. Use heat after that. Always place a thin cloth between your skin and the source of cold or heat.  Take medicines as directed. This helps keep pain under control. Always read labels, and call your healthcare provider or pharmacist if you have any questions.  Walk each day  A daily walk keeps your back and thigh muscles stretched and strong. This gives your back better support. Be sure to walk with your spine’s three curves aligned, by keeping your head, hips, and toes connected by a vertical line.  Date Last Reviewed: 5/1/2018 © 2000-2019 [employer]. [address] [place] [address]. All rights reserved. This information is not intended as a substitute for professional medical care. Always follow your healthcare professional's instructions.        How Your Back Works  A healthy back lets you bend and stretch without pain. The spine has 3 natural curves. These keep your body balanced. Strong, flexible muscles support your spine. Soft, cushioning disks separate the hard bones of your spine. The disks let your spine bend and move.    The parts of the spine  The vertebrae are the 24 bones that make up the spine.  The spinous process is the part of each vertebra you can feel through your skin.  Each of these bones has a central hole that runs top to bottom. Together these holes form a tunnel called the spinal canal.  The lamina of each vertebra forms the back of the spinal canal.  Running through the canal are nerves.  They are attached in a bundle called the spinal cord for most of the canal.  A foramen is a small opening where a spinal nerve root leaves the spinal canal.  Disks serve as cushions between vertebrae. A disk’s soft center absorbs shock during movement.     Two vertebrae and a disk     The supporting muscles  Strong, flexible muscles help maintain your 3 natural curves. They hold your spine in correct alignment. This helps support your upper body. Strong core muscles help take the strain off your back. These include the stomach, buttock, and thigh muscles.  Date Last Reviewed: 1/1/2018  © 7390-7220 [employer]. [address] [place] [address]. All rights reserved. This information is not intended as a substitute for professional medical care. Always follow your healthcare professional's instructions.        Relieving Tension in Your Back  Being relaxed helps keep your mind healthy and your back ready to move. Take short breaks often. Walk around. Stretch. Switch tasks. Also give the following a try.  Make time to relax. Start by setting aside 5 minutes daily.  Deep breathing    Deep breathing is a simple way to reduce stress. You can do it almost any time you need to relax.  Inhale slowly through your nose. Let your lungs and stomach expand.  Hold your breath for 2 to 3 seconds.  Exhale slowly through your mouth until your lungs feel empty. Repeat 3 to 4 times.  Relieve tension  Muscle tension can create tender spots called trigger points. The tips below may help relieve muscle tension.  Press the trigger point if you can reach it. If not, lie on a soft tennis ball, or ask a friend to press the spot. Use steady pressure for 10 to 15 seconds. Breathe deeply. Repeat a few times.  Massage trigger points with ice for 2 to 5 minutes. Press lightly at first. Slowly increase firmness.  Date Last Reviewed: 5/1/2018  © 7923-0364 [employer]. [address] [place] [address]. All  rights reserved. This information is not intended as a substitute for professional medical care. Always follow your healthcare professional's instructions.        Back Exercises: Lower Back Rotation    To start, lie on your back with your knees bent and feet flat on the floor. Don’t press your neck or lower back to the floor. Breathe deeply. You should feel comfortable and relaxed in this position.  Drop both knees to one side. Turn your head to the other side. Keep your shoulders flat on the floor.  Do not push through pain.  Hold for 20 seconds.  Slowly switch sides.  Repeat 2 to 5 times.  Date Last Reviewed: 3/1/2018  © 3119-2008 [employer]. [address] [place] [address]. All rights reserved. This information is not intended as a substitute for professional medical care. Always follow your healthcare professional's instructions.        Back Exercises: Lower Back Stretch    To start, sit in a chair with your feet flat on the floor. Shift your weight slightly forward. Relax, and keep your ears, shoulders, and hips aligned while you do the following:  Sit with your feet well apart.  Bend forward and touch the floor with the backs of your hands. Relax and let your body drop.  Hold for 20 seconds. Return to starting position.  Repeat 2 times.   Date Last Reviewed: 11/1/2017  © 5076-7044 [employer]. [address] [place] [address]. All rights reserved. This information is not intended as a substitute for professional medical care. Always follow your healthcare professional's instructions.        Back Exercises: Seated Rotation    To start, sit in a chair with your feet flat on the floor. Shift your weight slightly forward to avoid rounding your back. Relax, and keep your ears, shoulders, and hips aligned:  Fold your arms and elbows just below shoulder height.  Turn from the waist with hips forward. Turn your head last. Do not push through the pain.  Hold for a count  of 10 to 30. Return to starting position.  Repeat 3 to 5 times on one side. Then switch sides.  Date Last Reviewed: 3/1/2018  © 3789-2831 [employer]. [address] [place] [address], [place] [address]. All rights reserved. This information is not intended as a substitute for professional medical care. Always follow your healthcare professional's instructions.             Orders Placed This Encounter   Procedures    Lipid Panel    URINALYSIS, AUTO, W/O SCOPE       Meds This Visit:  Requested Prescriptions      No prescriptions requested or ordered in this encounter       Imaging & Referrals:  GASTRO - INTERNAL      RTC 6 months for FU DM.     Israel Cornelius's SCREENING SCHEDULE   Tests on this list are recommended by your physician but may not be covered, or covered at this frequency, by your insurer.   Please check with your insurance carrier before scheduling to verify coverage.   PREVENTATIVE SERVICES FREQUENCY &  COVERAGE DETAILS LAST COMPLETION DATE   Diabetes Screening    Fasting Blood Sugar / Glucose    One screening every 12 months if never tested or if previously tested but not diagnosed with pre-diabetes   One screening every 6 months if diagnosed with pre-diabetes Lab Results   Component Value Date     (H) 02/25/2024        Cardiovascular Disease Screening    Lipid Panel  Cholesterol  Lipoprotein (HDL)  Triglycerides Covered every 5 years for all Medicare beneficiaries without apparent signs or symptoms of cardiovascular disease Lab Results   Component Value Date    CHOLEST 96 06/27/2023    HDL 39 (L) 06/27/2023    LDL 32 06/27/2023    LDLD 57 06/24/2022    TRIG 143 06/27/2023         Electrocardiogram (EKG)   Covered if needed at Welcome to Medicare, and non-screening if indicated for medical reasons 11/30/2020      Ultrasound Screening for Abdominal Aortic Aneurysm (AAA) Covered once in a lifetime for one of the following risk factors    Men who are 65-75 years old and have ever smoked     Anyone with a family history -     Colorectal Cancer Screening  Covered for ages 50-85; only need ONE of the following:    Colonoscopy   Covered every 10 years    Covered every 2 years if patient is at high risk or previous colonoscopy was abnormal 03/04/2020    No recommendations at this time    Flexible Sigmoidoscopy   Covered every 4 years -    Fecal Occult Blood Test Covered annually -   Prostate Cancer Screening    Prostate-Specific Antigen (PSA) Annually Lab Results   Component Value Date    PSA <0.01 03/04/2022     There are no preventive care reminders to display for this patient.   Immunizations    Influenza Covered once per flu season  Please get every year 12/14/2023  No recommendations at this time    Pneumococcal Each vaccine ([id number] & [id number]) covered once after 65 Prevnar 13: 11/24/2015    [id number]: 11/24/2020     No recommendations at this time    Hepatitis B One screening covered for patients with certain risk factors   -  No recommendations at this time    Tetanus Toxoid Not covered by Medicare Part B unless medically necessary (cut with metal); may be covered with your pharmacy prescription benefits -    Tetanus, Diptheria and Pertusis TD and TDaP Not covered by Medicare Part B -  No recommendations at this time    Zoster Not covered by Medicare Part B; may be covered with your pharmacy  prescription benefits -  No recommendations at this time     Diabetes      Hemoglobin A1C Annually; if last result is elevated, may be asked to retest more frequently.    Medicare covers every 3 months Lab Results   Component Value Date     (H) 06/03/2024    A1C 7.7 (H) 06/03/2024       No recommendations at this time    Creat/alb ratio Annually Lab Results   Component Value Date    MICROALBCREA 3.6 12/14/2023       LDL Annually Lab Results   Component Value Date    LDL 32 06/27/2023       Dilated Eye Exam Annually Last Diabetic Eye Exam:  Last Dilated Eye Exam: 09/12/23  Eye Exam shows  Diabetic Retinopathy?: No       Annual Monitoring of Persistent Medications (ACE/ARB, digoxin diuretics, anticonvulsants)    Potassium Annually Lab Results   Component Value Date    K 4.7 02/25/2024         Creatinine   Annually Lab Results   Component Value Date    CREATSERUM 1.30 02/25/2024         BUN Annually Lab Results   Component Value Date    BUN 19 02/25/2024       Drug Serum Conc Annually No results found for: \"DIGOXIN\", \"DIG\", \"VALP\"         Chronic Obstructive Pulmonary Disease (COPD)    Spirometry Annually Spirometry date:

## 2024-09-16 ENCOUNTER — TELEPHONE (OUTPATIENT)
Dept: CASE MANAGEMENT | Age: 84
End: 2024-09-16

## 2024-09-16 DIAGNOSIS — E11.9 CONTROLLED TYPE 2 DIABETES MELLITUS WITHOUT COMPLICATION, WITHOUT LONG-TERM CURRENT USE OF INSULIN (HCC): ICD-10-CM

## 2024-09-16 DIAGNOSIS — H25.9 AGE-RELATED CATARACT OF BOTH EYES, UNSPECIFIED AGE-RELATED CATARACT TYPE: ICD-10-CM

## 2024-09-16 DIAGNOSIS — H40.009 BORDERLINE GLAUCOMA, UNSPECIFIED LATERALITY: Primary | ICD-10-CM

## 2024-09-16 NOTE — TELEPHONE ENCOUNTER
Dr. Griggs,     Patient called requesting referral to Dr. Douglas.     Pended referral please review diagnosis and sign off if you agree.    Thank you.  Aida Carmona  [facility]

## 2024-09-26 ENCOUNTER — TELEPHONE (OUTPATIENT)
Dept: CASE MANAGEMENT | Age: 84
End: 2024-09-26

## 2024-09-26 DIAGNOSIS — Z12.83 SKIN CANCER SCREENING: Primary | ICD-10-CM

## 2024-09-26 NOTE — TELEPHONE ENCOUNTER
Dr. Griggs,     Patient called requesting referral to Dr. Johnathan Bear for skin cancer screening.     Pended referral please review diagnosis and sign off if you agree.    Thank you.  Aida Carmona  [facility]

## 2024-10-02 ENCOUNTER — TELEPHONE (OUTPATIENT)
Dept: INTERNAL MEDICINE CLINIC | Facility: CLINIC | Age: 84
End: 2024-10-02

## 2024-10-02 NOTE — TELEPHONE ENCOUNTER
Patient has appointment with Dr.Lisa Douglas ophthalmology tomorrow 10/3 at 1pm. Patient would like referral faxed to [phone]. Please advise

## 2024-10-08 ENCOUNTER — MED REC SCAN ONLY (OUTPATIENT)
Dept: INTERNAL MEDICINE CLINIC | Facility: CLINIC | Age: 84
End: 2024-10-08

## 2024-10-10 ENCOUNTER — LAB REQUISITION (OUTPATIENT)
Dept: LAB | Facility: HOSPITAL | Age: 84
End: 2024-10-10
Payer: MEDICARE

## 2024-10-10 ENCOUNTER — APPOINTMENT (OUTPATIENT)
Dept: URBAN - METROPOLITAN AREA CLINIC 244 | Age: 84
Setting detail: DERMATOLOGY
End: 2024-10-10

## 2024-10-10 DIAGNOSIS — L82.1 OTHER SEBORRHEIC KERATOSIS: ICD-10-CM

## 2024-10-10 DIAGNOSIS — L40.0 PSORIASIS VULGARIS: ICD-10-CM

## 2024-10-10 DIAGNOSIS — L81.4 OTHER MELANIN HYPERPIGMENTATION: ICD-10-CM

## 2024-10-10 DIAGNOSIS — D22 MELANOCYTIC NEVI: ICD-10-CM

## 2024-10-10 DIAGNOSIS — Z85.828 PERSONAL HISTORY OF OTHER MALIGNANT NEOPLASM OF SKIN: ICD-10-CM

## 2024-10-10 DIAGNOSIS — L57.0 ACTINIC KERATOSIS: ICD-10-CM

## 2024-10-10 PROBLEM — D22.9 MELANOCYTIC NEVI, UNSPECIFIED: Status: ACTIVE | Noted: 2024-10-10

## 2024-10-10 PROCEDURE — OTHER PRESCRIPTION: OTHER

## 2024-10-10 PROCEDURE — 17004 DESTROY PREMAL LESIONS 15/>: CPT

## 2024-10-10 PROCEDURE — OTHER COUNSELING: OTHER

## 2024-10-10 PROCEDURE — OTHER BIOPSY BY SHAVE METHOD: OTHER

## 2024-10-10 PROCEDURE — 11102 TANGNTL BX SKIN SINGLE LES: CPT | Mod: 59

## 2024-10-10 PROCEDURE — OTHER SEPARATE AND IDENTIFIABLE DOCUMENTATION: OTHER

## 2024-10-10 PROCEDURE — 99214 OFFICE O/P EST MOD 30 MIN: CPT | Mod: 25

## 2024-10-10 PROCEDURE — OTHER LIQUID NITROGEN: OTHER

## 2024-10-10 PROCEDURE — 88305 TISSUE EXAM BY PATHOLOGIST: CPT | Performed by: STUDENT IN AN ORGANIZED HEALTH CARE EDUCATION/TRAINING PROGRAM

## 2024-10-10 PROCEDURE — 88312 SPECIAL STAINS GROUP 1: CPT | Performed by: STUDENT IN AN ORGANIZED HEALTH CARE EDUCATION/TRAINING PROGRAM

## 2024-10-10 RX ORDER — CLOBETASOL PROPIONATE 0.5 MG/G
CREAM TOPICAL
Qty: 45 | Refills: 0 | Status: ERX | COMMUNITY
Start: 2024-10-10

## 2024-10-10 ASSESSMENT — LOCATION ZONE DERM
LOCATION ZONE: ARM
LOCATION ZONE: FACE
LOCATION ZONE: TRUNK
LOCATION ZONE: SCALP

## 2024-10-10 ASSESSMENT — PGA PSORIASIS: PGA PSORIASIS 2020: MILD

## 2024-10-10 ASSESSMENT — LOCATION SIMPLE DESCRIPTION DERM
LOCATION SIMPLE: LEFT LOWER BACK
LOCATION SIMPLE: RIGHT FOREARM
LOCATION SIMPLE: ANTERIOR SCALP
LOCATION SIMPLE: SUPERIOR FOREHEAD
LOCATION SIMPLE: LEFT FOREARM
LOCATION SIMPLE: LEFT UPPER BACK

## 2024-10-10 ASSESSMENT — LOCATION DETAILED DESCRIPTION DERM
LOCATION DETAILED: MID-FRONTAL SCALP
LOCATION DETAILED: LEFT INFERIOR MEDIAL MIDBACK
LOCATION DETAILED: LEFT PROXIMAL DORSAL FOREARM
LOCATION DETAILED: LEFT MEDIAL UPPER BACK
LOCATION DETAILED: SUPERIOR MID FOREHEAD
LOCATION DETAILED: RIGHT DISTAL DORSAL FOREARM
LOCATION DETAILED: LEFT DISTAL DORSAL FOREARM

## 2024-10-10 ASSESSMENT — BSA PSORIASIS: % BODY COVERED IN PSORIASIS: 2

## 2024-10-10 NOTE — PROCEDURE: BIOPSY BY SHAVE METHOD
Detail Level: Detailed
Depth Of Biopsy: dermis
Was A Bandage Applied: Yes
Size Of Lesion In Cm: 0
Biopsy Type: H and E
Biopsy Method: Dermablade
Anesthesia Type: 1% lidocaine with epinephrine
Anesthesia Volume In Cc: 0.5
Hemostasis: Drysol
Wound Care: Petrolatum
Dressing: bandage
Destruction After The Procedure: No
Type Of Destruction Used: Curettage
Curettage Text: The wound bed was treated with curettage after the biopsy was performed.
Cryotherapy Text: The wound bed was treated with cryotherapy after the biopsy was performed.
Electrodesiccation Text: The wound bed was treated with electrodesiccation after the biopsy was performed.
Electrodesiccation And Curettage Text: The wound bed was treated with electrodesiccation and curettage after the biopsy was performed.
Silver Nitrate Text: The wound bed was treated with silver nitrate after the biopsy was performed.
Lab: -[id number]
Consent: Written consent was obtained and risks were reviewed including but not limited to scarring, infection, bleeding, scabbing, incomplete removal, nerve damage and allergy to anesthesia.
Post-Care Instructions: I reviewed with the patient in detail post-care instructions. Patient is to keep the biopsy site dry overnight, and then apply bacitracin twice daily until healed. Patient may apply hydrogen peroxide soaks to remove any crusting.
Notification Instructions: Patient will be notified of biopsy results. However, patient instructed to call the office if not contacted within 2 weeks.
Billing Type: Third-Party Bill
Information: Selecting Yes will display possible errors in your note based on the variables you have selected. This validation is only offered as a suggestion for you. PLEASE NOTE THAT THE VALIDATION TEXT WILL BE REMOVED WHEN YOU FINALIZE YOUR NOTE. IF YOU WANT TO FAX A PRELIMINARY NOTE YOU WILL NEED TO TOGGLE THIS TO 'NO' IF YOU DO NOT WANT IT IN YOUR FAXED NOTE.

## 2024-10-10 NOTE — PROCEDURE: LIQUID NITROGEN
.  She has used 5 out of 30 days, has not been able to use greater than 4 hours.  Average use is 31 minutes.  Settings are AutoPap 6-18 CWP.  Pressure 95th percentile is 8.7, maximum 9.2 and median is 6.4.  AHI 0    Please change her pressure settings to AutoPap 5-10 CWP.  Make sure ramp is on maximum time and set at 4  
Called back and informed her of test results.  
Called [facility] to have pt linked so we could get compliance waiting on a call back.  
Daughter calling back for results.   
[facility]ed [facility] to [facility] this morning  
Informed daughter.    
Patients daughter called and says patient cannot get used to her CPAP. She feels like she cannot breathe - Pls call to discus options  
Pressures changed.    
Received in [place]. In [facility] for review.   
Detail Level: Simple
Consent: The patient has had the opportunity to ask questions and understand the purpose of the treatment, benefits, risks, and alternatives. Consent has been obtained after discussing/reviewing the following:  \\n- Risks to the procedure may include but are not limited to pain/discomfort, redness, swelling, crusting/scabbing/blistering, discoloration, recurrence, persistence, and the risk of scarring. \\n- Rec re-evaluation in clinic if an AK does not resolve within 6 wks and/or sooner if worsening.
Render In Bullet Format When Appropriate: Yes
Post-Care Instructions: I reviewed with the patient post-care instructions. Patient is to wear sunprotection / sun protective clothing and avoid picking areas. Vaseline use daily is encouraged until healed.
Total Number Of Aks Treated: 17
Number Of Freeze-Thaw Cycles: 1 freeze-thaw cycle

## 2024-10-10 NOTE — PROCEDURE: COUNSELING
Detail Level: Generalized
Detail Level: Simple
Detail Level: Detailed
Detail Level: Zone
Nicotinamide Supplementation Recommendations: All supplements should be USP (https://www.usp.org/verification-services/verified-clarisse).
Sunscreen Recommendations: Brands include neutrogena, La-Roche, and Elta-MD. Rec mineral sunscreen use (zinc/titanium dioxide) over chemical sunscreens due to safety.

## 2024-10-18 RX ORDER — ROSUVASTATIN CALCIUM 20 MG/1
20 TABLET, COATED ORAL NIGHTLY
Qty: 90 TABLET | Refills: 3 | Status: SHIPPED | OUTPATIENT
Start: 2024-10-18

## 2024-10-18 NOTE — TELEPHONE ENCOUNTER
Please review. Protocol Failed; No Protocol    Message sent to patient to complete labs     Requested Prescriptions   Pending Prescriptions Disp Refills    ROSUVASTATIN 20 MG Oral Tab [Pharmacy Med Name: Rosuvastatin Calcium Oral Tablet 20 MG] 90 tablet 3     Sig: TAKE 1 TABLET EVERY NIGHT       Cholesterol Medication Protocol Failed - 10/18/2024 10:28 AM        Failed - Lipid panel within past 12 months     Lab Results   Component Value Date    CHOLEST 96 06/27/2023    TRIG 143 06/27/2023    HDL 39 (L) 06/27/2023    LDL 32 06/27/2023    VLDL 19 06/27/2023    TCHDLRATIO 3.2 01/12/2023    NONHDLC 57 06/27/2023             Passed - ALT < 80     Lab Results   Component Value Date    ALT 29 02/25/2024             Passed - ALT resulted within past year        Passed - In person appointment or virtual visit in the past 12 mos or appointment in next 3 mos     Recent Outpatient Visits              4 months ago Type 2 diabetes mellitus without complication, without long-term current use of insulin ([facility])    [facility] Janiya Griggs MD    Office Visit    7 months ago Acute right-sided low back pain with sciatica, sciatica laterality unspecified    [facility] Debora Salas MD    Office Visit    10 months ago Abnormal computed tomography of ureter    [facility] Janiya Griggs MD    Office Visit    1 year ago Stage 3b chronic kidney disease ([facility])    [facility] Janiya Griggs MD    Telemedicine    1 year ago Uncontrolled type 2 diabetes mellitus with hyperglycemia ([facility])    [facility] Marisel Ward APRN    Office Visit          Future Appointments         Provider Department Appt Notes    In 1 month Janiya Griggs MD [facility] 6 month fu                            Future Appointments         Provider Department Appt Notes    In 1 month Janiya Griggs MD [facility] 6 month fu          Recent Outpatient Visits              4 months ago Type 2 diabetes mellitus without complication, without long-term current use of insulin ([facility])    [facility] Janiya Griggs MD    Office Visit    7 months ago Acute right-sided low back pain with sciatica, sciatica laterality unspecified    [facility] Debora Salas MD    Office Visit    10 months ago Abnormal computed tomography of ureter    [facility] Jan[facility]    Office Visit    1 year ago Stage 3b chronic kidney disease (HCC)    [facility] Jan[facility] Gr[facility]    Telemedicine    1 year ago Uncontrolled type 2 diabetes mellitus with hyperglycemia ([facility])    [facility] Marisel Ward APRN    Office Visit

## 2024-10-21 ENCOUNTER — LAB ENCOUNTER (OUTPATIENT)
Dept: LAB | Age: 84
End: 2024-10-21
Attending: INTERNAL MEDICINE
Payer: MEDICARE

## 2024-10-21 DIAGNOSIS — E11.9 TYPE 2 DIABETES MELLITUS WITHOUT COMPLICATION, WITHOUT LONG-TERM CURRENT USE OF INSULIN (HCC): ICD-10-CM

## 2024-10-21 DIAGNOSIS — E78.2 MIXED HYPERLIPIDEMIA: ICD-10-CM

## 2024-10-21 DIAGNOSIS — D64.9 ANEMIA, UNSPECIFIED TYPE: ICD-10-CM

## 2024-10-21 LAB
BASOPHILS # BLD AUTO: 0.05 X10(3) UL (ref 0–0.2)
BASOPHILS NFR BLD AUTO: 0.9 %
CHOLEST SERPL-MCNC: 121 MG/DL (ref ?–200)
DEPRECATED HBV CORE AB SER IA-ACNC: 26 NG/ML
DEPRECATED RDW RBC AUTO: 45 FL (ref 35.1–46.3)
EOSINOPHIL # BLD AUTO: 0.46 X10(3) UL (ref 0–0.7)
EOSINOPHIL NFR BLD AUTO: 8.2 %
ERYTHROCYTE [DISTWIDTH] IN BLOOD BY AUTOMATED COUNT: 12.6 % (ref 11–15)
EST. AVERAGE GLUCOSE BLD GHB EST-MCNC: 186 MG/DL (ref 68–126)
FASTING PATIENT LIPID ANSWER: YES
HBA1C MFR BLD: 8.1 % (ref ?–5.7)
HCT VFR BLD AUTO: 42.9 %
HDLC SERPL-MCNC: 39 MG/DL (ref 40–59)
HGB BLD-MCNC: 14.2 G/DL
IMM GRANULOCYTES # BLD AUTO: 0.02 X10(3) UL (ref 0–1)
IMM GRANULOCYTES NFR BLD: 0.4 %
IRON SATN MFR SERPL: 19 %
IRON SERPL-MCNC: 78 UG/DL
LDLC SERPL CALC-MCNC: 55 MG/DL (ref ?–100)
LYMPHOCYTES # BLD AUTO: 1.51 X10(3) UL (ref 1–4)
LYMPHOCYTES NFR BLD AUTO: 26.8 %
MCH RBC QN AUTO: 32.2 PG (ref 26–34)
MCHC RBC AUTO-ENTMCNC: 33.1 G/DL (ref 31–37)
MCV RBC AUTO: 97.3 FL
MONOCYTES # BLD AUTO: 0.49 X10(3) UL (ref 0.1–1)
MONOCYTES NFR BLD AUTO: 8.7 %
NEUTROPHILS # BLD AUTO: 3.11 X10 (3) UL (ref 1.5–7.7)
NEUTROPHILS # BLD AUTO: 3.11 X10(3) UL (ref 1.5–7.7)
NEUTROPHILS NFR BLD AUTO: 55 %
NONHDLC SERPL-MCNC: 82 MG/DL (ref ?–130)
PLATELET # BLD AUTO: 276 10(3)UL (ref 150–450)
RBC # BLD AUTO: 4.41 X10(6)UL
TIBC SERPL-MCNC: 414 UG/DL (ref 250–425)
TRANSFERRIN SERPL-MCNC: 278 MG/DL (ref 215–365)
TRIGL SERPL-MCNC: 157 MG/DL (ref 30–149)
VLDLC SERPL CALC-MCNC: 23 MG/DL (ref 0–30)
WBC # BLD AUTO: 5.6 X10(3) UL (ref 4–11)

## 2024-10-21 PROCEDURE — 83540 ASSAY OF IRON: CPT

## 2024-10-21 PROCEDURE — 82728 ASSAY OF FERRITIN: CPT

## 2024-10-21 PROCEDURE — 83036 HEMOGLOBIN GLYCOSYLATED A1C: CPT

## 2024-10-21 PROCEDURE — 36415 COLL VENOUS BLD VENIPUNCTURE: CPT

## 2024-10-21 PROCEDURE — 84466 ASSAY OF TRANSFERRIN: CPT

## 2024-10-21 PROCEDURE — 80061 LIPID PANEL: CPT

## 2024-10-21 PROCEDURE — 85025 COMPLETE CBC W/AUTO DIFF WBC: CPT

## 2024-10-21 NOTE — PROGRESS NOTES
CBC Normal (white blood cells and red blood cells and platelets),   Lipid (choilesterol) is good,   Iron storage is better than prior but still low.  Goal is between 30 and 60.  Iron levels are low.  Increase iron to twice a day 65 with vitamin C 500 once a day with folic acid all available over-the-counter 1 mg a day.  Recheck iron levels in 3 months.  Orders written for future labs in 3 months.  I know he has seen GI in the past.  But I would recommend he follow-up with them to see if they want repeat testing.  Why is he losing blood?

## 2024-10-29 ENCOUNTER — HOSPITAL ENCOUNTER (OUTPATIENT)
Dept: ENDOCRINOLOGY | Facility: HOSPITAL | Age: 84
Discharge: HOME | End: 2024-10-29
Attending: INTERNAL MEDICINE
Payer: MEDICARE

## 2024-10-29 VITALS — BODY MASS INDEX: 28 KG/M2 | WEIGHT: 183.69 LBS

## 2024-10-29 DIAGNOSIS — E11.9 TYPE 2 DIABETES MELLITUS WITHOUT COMPLICATION, WITHOUT LONG-TERM CURRENT USE OF INSULIN (HCC): Primary | ICD-10-CM

## 2024-10-29 NOTE — PROGRESS NOTES
Israel Cornelius  DOB1/1940 was seen for Diabetic Education Initial Visit    Date: 10/29/2024  Referring Provider: An[place]  Start time: 1300  End time: 1330      Assessment:   Pt with h/o diabetes for many years; recent A1c johnny from 7.7% to 8.1%.  Pt states that his weakness is fresh baked bread and that he has been buying some from a new bakery this past couple of months.    Pt states that he is supposed to take his Amaryl tid but he says that when he away from home, he forgets to bring it with him.  Because pt is active and goes out frequently, he was advised to talk to you about alternate dosing to get his total of 3mg per day.      HEMOGLOBIN A1c (% of total Hgb)   Date Value   2023 8.2 (H)     HgbA1C (%)   Date Value   10/21/2024 8.1 (H)       Weight:  183.7#    Diagnosis Type 2      Teaching Content    Reviewed diabetes / pre-diabetes as a diagnosis    Reviewed diabetic medications with patient.      Discussed basic nutrition concepts for diabetes management.  Instructed on meal planning    Instructed/ reinforced glucose monitoring schedules: pt admits that he does not check regularly  Reviewed glucose logs:    FB  -  138     Discussed Hypoglycemia management    Encouraged exercise and effects on glucose control-  pt joined [employer] 3 wks, goes 3 times per week with his wife.      Patient verbalized understanding and has no further questions at this time.    Aura Elam RD

## 2024-11-18 ENCOUNTER — TELEPHONE (OUTPATIENT)
Dept: INTERNAL MEDICINE CLINIC | Facility: CLINIC | Age: 84
End: 2024-11-18

## 2024-11-18 NOTE — TELEPHONE ENCOUNTER
Pt asking if Folic acid and Magnesium 500 mg and Vit D3 2000 units  on Current Med list -advised not on list to bring to office visit in December

## 2024-11-25 RX ORDER — LISINOPRIL 5 MG/1
5 TABLET ORAL DAILY
Qty: 90 TABLET | Refills: 3 | Status: SHIPPED | OUTPATIENT
Start: 2024-11-25

## 2024-11-25 NOTE — TELEPHONE ENCOUNTER
Refill passed per [facility] protocol.     Requested Prescriptions   Pending Prescriptions Disp Refills    LISINOPRIL 5 MG Oral Tab [Pharmacy Med Name: Lisinopril Oral Tablet 5 MG] 90 tablet 3     Sig: TAKE 1 TABLET EVERY DAY       Hypertension Medications Protocol Passed - 11/25/2024  1:56 PM        Passed - CMP or BMP in past 12 months        Passed - Last BP reading less than 140/90     BP Readings from Last 1 Encounters:   06/13/24 124/70               Passed - In person appointment or virtual visit in the past 12 mos or appointment in next 3 mos     Recent Outpatient Visits              5 months ago Type 2 diabetes mellitus without complication, without long-term current use of insulin ([facility])    [facility] Janiya Griggs MD    Office Visit    8 months ago Acute right-sided low back pain with sciatica, sciatica laterality unspecified    [facility] Debora Salas MD    Office Visit    11 months ago Abnormal computed tomography of ureter    [facility] Janiya Griggs MD    Office Visit    1 year ago Stage 3b chronic kidney disease ([facility])    [facility] Janiya Griggs MD    Telemedicine    1 year ago Uncontrolled type 2 diabetes mellitus with hyperglycemia ([facility])    [facility] Marisel Ward APRN    Office Visit          Future Appointments         Provider Department Appt Notes    In 2 weeks Janiya Griggs MD [facility] 6 month fu                    Passed - EGFRCR or GFRNAA > 50     GFR Evaluation  EGFRCR: 55 , resulted on 2/25/2024               [unfilled]      [unfilled]

## 2024-12-12 ENCOUNTER — OFFICE VISIT (OUTPATIENT)
Dept: INTERNAL MEDICINE CLINIC | Facility: CLINIC | Age: 84
End: 2024-12-12
Payer: MEDICARE

## 2024-12-12 VITALS
OXYGEN SATURATION: 100 % | HEART RATE: 67 BPM | TEMPERATURE: 97 F | DIASTOLIC BLOOD PRESSURE: 73 MMHG | HEIGHT: 68 IN | SYSTOLIC BLOOD PRESSURE: 124 MMHG | BODY MASS INDEX: 27.46 KG/M2 | WEIGHT: 181.19 LBS

## 2024-12-12 DIAGNOSIS — R79.89 ABNORMAL TSH: Primary | ICD-10-CM

## 2024-12-12 DIAGNOSIS — E83.42 HYPOMAGNESEMIA: ICD-10-CM

## 2024-12-12 DIAGNOSIS — R93.89 ABNORMAL FINDINGS ON DIAGNOSTIC IMAGING OF OTHER SPECIFIED BODY STRUCTURES: ICD-10-CM

## 2024-12-12 DIAGNOSIS — R79.89 ELEVATED PTHRP LEVEL: ICD-10-CM

## 2024-12-12 DIAGNOSIS — Z80.0 FAMILY HISTORY OF COLON CANCER: ICD-10-CM

## 2024-12-12 DIAGNOSIS — N18.31 STAGE 3A CHRONIC KIDNEY DISEASE (HCC): Chronic | ICD-10-CM

## 2024-12-12 DIAGNOSIS — E11.9 TYPE 2 DIABETES MELLITUS WITHOUT COMPLICATION, WITHOUT LONG-TERM CURRENT USE OF INSULIN (HCC): ICD-10-CM

## 2024-12-12 DIAGNOSIS — E83.52 HYPERCALCEMIA: ICD-10-CM

## 2024-12-12 DIAGNOSIS — I10 ESSENTIAL HYPERTENSION: ICD-10-CM

## 2024-12-12 DIAGNOSIS — E78.2 MIXED HYPERLIPIDEMIA: ICD-10-CM

## 2024-12-12 DIAGNOSIS — D50.8 OTHER IRON DEFICIENCY ANEMIA: ICD-10-CM

## 2024-12-12 DIAGNOSIS — R94.6 ABNORMAL RESULTS OF THYROID FUNCTION STUDIES: ICD-10-CM

## 2024-12-12 DIAGNOSIS — Z71.85 VACCINE COUNSELING: ICD-10-CM

## 2024-12-12 DIAGNOSIS — E87.5 HYPERKALEMIA: ICD-10-CM

## 2024-12-12 LAB
CREAT UR-SCNC: 130.9 MG/DL
MICROALBUMIN UR-MCNC: <0.3 MG/DL

## 2024-12-12 PROCEDURE — 1160F RVW MEDS BY RX/DR IN RCRD: CPT | Performed by: INTERNAL MEDICINE

## 2024-12-12 PROCEDURE — 99214 OFFICE O/P EST MOD 30 MIN: CPT | Performed by: INTERNAL MEDICINE

## 2024-12-12 PROCEDURE — 1159F MED LIST DOCD IN RCRD: CPT | Performed by: INTERNAL MEDICINE

## 2024-12-12 PROCEDURE — 99499 UNLISTED E&M SERVICE: CPT | Performed by: INTERNAL MEDICINE

## 2024-12-12 PROCEDURE — 90662 IIV NO PRSV INCREASED AG IM: CPT | Performed by: INTERNAL MEDICINE

## 2024-12-12 PROCEDURE — G0008 ADMIN INFLUENZA VIRUS VAC: HCPCS | Performed by: INTERNAL MEDICINE

## 2024-12-12 PROCEDURE — 3078F DIAST BP <80 MM HG: CPT | Performed by: INTERNAL MEDICINE

## 2024-12-12 PROCEDURE — 1126F AMNT PAIN NOTED NONE PRSNT: CPT | Performed by: INTERNAL MEDICINE

## 2024-12-12 PROCEDURE — 3074F SYST BP LT 130 MM HG: CPT | Performed by: INTERNAL MEDICINE

## 2024-12-12 PROCEDURE — 3008F BODY MASS INDEX DOCD: CPT | Performed by: INTERNAL MEDICINE

## 2024-12-12 NOTE — PATIENT INSTRUCTIONS
ASSESSMENT/PLAN:     Encounter Diagnoses   Name Primary?    Abnormal TSH Check blood.    Yes    Essential hypertension Stable. Careful with diet and excercise at least 30 minutes 3-4 times a week. Check blood pressures at different times on different days. Can purchase own blood pressure monitor. If not, check at local pharmacy. Bake foods more and grill occasionally. Avoid fried foods. No salt. Use other seasonings.         Family history of colon cancer Up to date.        Hypercalcemia Check blood.        Mixed hyperlipidemia Stable.        Hyperkalemia Check blood.        Hypomagnesemia Check blood.        Other iron deficiency anemia Check blood.        Type 2 diabetes mellitus without complication, without long-term current use of insulin (HCC) Higher. Occ. misses glymiperide but now has set. Careful with diet and excercise at least 30 minutes 3-4 times a week. Check sugars at different times on different dates. Careful with low sugars. Carry something with you and check sugar if can. Can carry ye cracker, etc. Decrease carbohydrates. But also, careful with fruits and natural sugars.One serving a day and no more than 1 handful every day. Check feet  every AM and careful with sores and ulcers on feet bilaterally. Check eyes every year with dilated eye exam.  Check sugars.  2-hour postmeal should be less than 140s.  Pre-meal should be 's.  Both equally affected A1c.  Discussed importance of glycemic control to prevent complications of diabetes  -Discussed complications of diabetes include retinopathy, neuropathy, nephropathy and cardiovascular disease  -Discussed ABCs of DM  -Discussed importance of SBGM  -Discussed importance of low CHO diet, recommend 45gm per meal or 135gm per day  -UTD with optho  -Normotensive        Vaccine counseling.Flu shot today. Covid booster declined by pt.        Elevated PTHrP level Check blood.        Stage 3a chronic kidney disease (HCC) No motrin, ibuprofen, advil,  alleve, naprosyn  with these medications.                    Orders Placed This Encounter   Procedures    Magnesium    Microalb/Creat Ratio, Random Urine    TSH W Reflex To Free T4    Free T4, (Free Thyroxine)    Comp Metabolic Panel (14)    PTH-Related Peptide (Pth-Rp)    High Dose Fluzone trivalent influenza, 65yrs+ PFS (33139)       Meds This Visit:  Requested Prescriptions      No prescriptions requested or ordered in this encounter       Imaging & Referrals:  INFLUENZA VAC HIGH DOSE PRSV FREE        RTC 3 months for FU DM.     RTC 6-13-25 for physical.

## 2024-12-12 NOTE — PROGRESS NOTES
HPI:    Patient ID: Israel Cornelius is a 84 year old male.    Chief Complaint   Patient presents with    Follow - Up     Patient states he is here for a follow up.         Patient here for follow up of Diabetes.  Has been taking medications regularly.    Checks sugars 1 times daily.  Fasting sugars average 140's. No lows.   Watches diabetic diet, low salt. Has chocolate but same as prior.   Diabetic Flow sheet      Latest Ref Rng & Units 12/12/2024     1:43 PM 12/12/2024     1:07 PM 11/25/2024    12:00 AM 10/29/2024     1:51 PM 10/21/2024     9:50 AM   DIABETIC FLOWSHEET (EE)   BMI   27.55 kg/m2  27.93 kg/m2    Hgb A1c <5.7 %     8.1    Cholesterol Total <200 mg/dL     121    Triglycerides 30 - 149 mg/dL     157    HDL 40 - 59 mg/dL     39    LDL <100 mg/dL     55    HEMOGLOBIN A1c <5.7 %     8.1    Lisinopril  5 mg Daily OR 5 mg Daily OR  5 mg Daily OR  5 mg Daily OR  5 mg Daily OR    Weight (enc vitals)   181 lb 3.2 oz  183 lb 11.2 oz    BP (enc vitals)   124/73         HISTORY OF DIABETES COMPLICATIONS: :  History of Retinopathy:   History of Neuropathy:   History of Nephropathy:      ASSOCIATED COMPLICATIONS:   HTN:   Hyperlipidemia:   Coronary Artery Disease:  CAD,  HF, PAD  Cerebrovascular Disease:      MEALS:  3 meals a day    Hypertension  Patient is here for follow up of hypertension. BP at home: not check.   Has been compliant with medications.  Exercise level: not active (jarvis that walks with having Sx. thus not as active) and has been following low salt diet.  Weight has been stable. Bridge came loss and out L bottom 3 teeth. But new bakery and fresh rye and wheat bread. Eatting out more. Ordering in [facility]'s.   Wt Readings from Last 3 Encounters:   12/12/24 181 lb 3.2 oz (82.2 kg)   10/29/24 183 lb 11.2 oz (83.3 kg)   06/13/24 183 lb (83 kg)     BP Readings from Last 3 Encounters:   12/12/24 124/73   06/13/24 124/70   03/04/24 137/76     Labs:   Lab Results   Component Value Date/Time    GLU  164 (H) 02/25/2024 09:04 PM     02/25/2024 09:04 PM    K 4.7 02/25/2024 09:04 PM     02/25/2024 09:04 PM    CO2 26.0 02/25/2024 09:04 PM    CREATSERUM 1.30 02/25/2024 09:04 PM    CA 9.7 02/25/2024 09:04 PM    AST 26 02/25/2024 09:04 PM    ALT 29 02/25/2024 09:04 PM    TSH 2.040 08/16/2023 11:52 AM    T4F 1.1 01/12/2023 09:15 AM        Lab Results   Component Value Date/Time    CHOLEST 121 10/21/2024 09:50 AM    HDL 39 (L) 10/21/2024 09:50 AM    TRIG 157 (H) 10/21/2024 09:50 AM    LDL 55 10/21/2024 09:50 AM    NONHDLC 82 10/21/2024 09:50 AM            Wt Readings from Last 3 Encounters:   12/12/24 181 lb 3.2 oz (82.2 kg)   10/29/24 183 lb 11.2 oz (83.3 kg)   06/13/24 183 lb (83 kg)     BP Readings from Last 3 Encounters:   12/12/24 124/73   06/13/24 124/70   03/04/24 137/76     Labs:   Lab Results   Component Value Date/Time     (H) 02/25/2024 09:04 PM     02/25/2024 09:04 PM    K 4.7 02/25/2024 09:04 PM     02/25/2024 09:04 PM    CO2 26.0 02/25/2024 09:04 PM    CREATSERUM 1.30 02/25/2024 09:04 PM    CA 9.7 02/25/2024 09:04 PM    AST 26 02/25/2024 09:04 PM    ALT 29 02/25/2024 09:04 PM    TSH 2.040 08/16/2023 11:52 AM    T4F 1.1 01/12/2023 09:15 AM        Lab Results   Component Value Date/Time    CHOLEST 121 10/21/2024 09:50 AM    HDL 39 (L) 10/21/2024 09:50 AM    TRIG 157 (H) 10/21/2024 09:50 AM    LDL 55 10/21/2024 09:50 AM    NONHDLC 82 10/21/2024 09:50 AM          HPI      Review of Systems   Constitutional: Negative.  Negative for activity change, appetite change, chills, diaphoresis, fatigue, fever and unexpected weight change.   HENT:  Negative for congestion, mouth sores, postnasal drip, rhinorrhea, sinus pressure, sneezing, sore throat, trouble swallowing and voice change.    Eyes:  Negative for pain.   Respiratory:  Negative for apnea, cough, choking, chest tightness, shortness of breath, wheezing and stridor.    Cardiovascular:  Negative for chest pain, palpitations and leg  swelling.   Gastrointestinal:  Negative for diarrhea.   Endocrine: Negative for cold intolerance, heat intolerance, polydipsia, polyphagia and polyuria.   Musculoskeletal:  Negative for neck pain.   Neurological:  Negative for dizziness, tremors, seizures, syncope, facial asymmetry, speech difficulty, weakness, light-headedness, numbness and headaches.   Psychiatric/Behavioral:  The patient is not nervous/anxious.    All other systems reviewed and are negative.        Current Outpatient Medications   Medication Sig Dispense Refill    lisinopril 5 MG Oral Tab Take 1 tablet (5 mg total) by mouth daily. 90 tablet 3    rosuvastatin 20 MG Oral Tab Take 1 tablet (20 mg total) by mouth nightly. 90 tablet 3    ferrous sulfate 325 (65 FE) MG Oral Tab EC Take 1 tablet (325 mg total) by mouth daily with breakfast.      glimepiride 1 MG Oral Tab Take 1 tablet (1 mg total) by mouth 3 (three) times daily. 270 tablet 3    metFORMIN HCl 1000 MG Oral Tab Take 1 tablet (1,000 mg total) by mouth 2 (two) times daily. 180 tablet 3    levothyroxine 50 MCG Oral Tab Take 1 tablet (50 mcg total) by mouth before breakfast. 90 tablet 3    TRUEplus Lancets 33G Does not apply Misc TEST BLOOD SUGAR ONE TIME DAILY IN THE MORNING 100 each 11    Alcohol Swabs 70 % Does not apply Pads Clean area prior to checking blood glucose daily. 100 each 0    Blood Glucose Monitoring Suppl (TRUE METRIX METER) w/Device Does not apply Kit       Blood Gluc Meter Disp-Strips Does not apply Device Code: E11.9. Checks qam. 50 each 5    NON FORMULARY FISH OIL. Patient doen't know how many mg. He takes it once  a day.      Glucose Blood (ONETOUCH ULTRA) In Vitro Strip Dx. E11.9. Checks qam. 100 each 11    aspirin 81 MG Oral Tab Take 1 tablet (81 mg total) by mouth daily.      Calcium Carbonate-Vitamin D 600-200 MG-UNIT Oral Tab Take by mouth.      Multiple Vitamins-Minerals (MULTIVITAMIN OR) Take by mouth.       Allergies:Allergies[1]    HISTORY:  Past Medical History:     Cataracts, bilateral    Footdrop    right    History of blood transfusion    5 years ago    Hypogammaglobulinemia (HCC)    mild, IgG1 and IgM; no treatment    Inguinal hernia, right    Repair    Positive PPD    CXR negative.     Prostate cancer (HCC)      Past Surgical History:   Procedure Laterality Date    Appendectomy      Cataract extraction w/ intraocular lens implant Left 2020    GENARO CE LENSX/ORA MAC TORIC OS DR. SCRUGGS  [id number] +18.50    Cholecystectomy      Colonoscopy  2013    osis    Colonoscopy  2020    Colonoscopy N/A 3/4/2020    Procedure: COLONOSCOPY, POSSIBLE BIOPSY, POSSIBLE POLYPECTOMY 49614;  Surgeon: Salvador Perkins MD;  Location: [facility], [facility]    Eg  2013    stricture and large HH w Alonso erosions    Inguinal hernia repair Right     Other surgical history      radical prostatectomy      Family History   Problem Relation Age of Onset    Heart Attack Father         CAD    Cancer Father         lung    Cancer Sister         colon    Other (Other) Mother         alzheimers      Social History:   Social History     Socioeconomic History    Marital status:    Tobacco Use    Smoking status: Former     Current packs/day: 0.00     Types: Cigarettes     Quit date: 1998     Years since quittin.9    Smokeless tobacco: Never   Vaping Use    Vaping status: Never Used   Substance and Sexual Activity    Alcohol use: Yes     Comment: rarely    Drug use: No   Other Topics Concern    Caffeine Concern Yes     Comment: coffee, 2 cups        PHYSICAL EXAM:   /73 (BP Location: Right arm, Patient Position: Sitting, Cuff Size: adult)   Pulse 67   Temp 97.3 °F (36.3 °C) (Oral)   Ht 5' 8\" (1.727 m)   Wt 181 lb 3.2 oz (82.2 kg)   SpO2 100%   BMI 27.55 kg/m²   BP Readings from Last 3 Encounters:   24 124/73   24 124/70   24 137/76     Wt Readings from Last 3 Encounters:   24 181 lb 3.2 oz (82.2 kg)   10/29/24 183 lb 11.2 oz (83.3 kg)    06/13/24 183 lb (83 kg)       Physical Exam  Vitals and nursing note reviewed.   Constitutional:       General: He is not in acute distress.     Appearance: He is well-developed and well-groomed. He is not ill-appearing, toxic-appearing or diaphoretic.      Interventions: He is not intubated.  Neck:      Thyroid: No thyromegaly.      Vascular: No JVD.      Trachea: No tracheal deviation.   Cardiovascular:      Rate and Rhythm: Normal rate and regular rhythm.      Pulses: Normal pulses.           Carotid pulses are 2+ on the right side and 2+ on the left side.       Radial pulses are 2+ on the right side and 2+ on the left side.        Dorsalis pedis pulses are 2+ on the right side and 2+ on the left side.        Posterior tibial pulses are 2+ on the right side and 2+ on the left side.      Heart sounds: Normal heart sounds, S1 normal and S2 normal.   Pulmonary:      Effort: Pulmonary effort is normal. No tachypnea, bradypnea, accessory muscle usage, prolonged expiration, respiratory distress or retractions. He is not intubated.      Breath sounds: Normal breath sounds. No stridor, decreased air movement or transmitted upper airway sounds. No decreased breath sounds, wheezing, rhonchi or rales.   Chest:      Chest wall: No tenderness.   Abdominal:      General: Bowel sounds are normal.      Palpations: Abdomen is soft.      Tenderness: There is no abdominal tenderness.   Musculoskeletal:      Right lower leg: No edema.      Left lower leg: No edema.   Skin:     General: Skin is warm and dry.   Neurological:      Mental Status: He is alert and oriented to person, place, and time.   Psychiatric:         Behavior: Behavior normal. Behavior is cooperative.              ASSESSMENT/PLAN:     Encounter Diagnoses   Name Primary?    Abnormal TSH Check blood.    Yes    Essential hypertension Stable. Careful with diet and excercise at least 30 minutes 3-4 times a week. Check blood pressures at different times on different  days. Can purchase own blood pressure monitor. If not, check at local pharmacy. Bake foods more and grill occasionally. Avoid fried foods. No salt. Use other seasonings.         Family history of colon cancer Up to date.        Hypercalcemia Check blood.        Mixed hyperlipidemia Stable.        Hyperkalemia Check blood.        Hypomagnesemia Check blood.        Other iron deficiency anemia Check blood.        Type 2 diabetes mellitus without complication, without long-term current use of insulin (HCC) Higher. Occ. misses glymiperide but now has set. Careful with diet and excercise at least 30 minutes 3-4 times a week. Check sugars at different times on different dates. Careful with low sugars. Carry something with you and check sugar if can. Can carry ye cracker, etc. Decrease carbohydrates. But also, careful with fruits and natural sugars.One serving a day and no more than 1 handful every day. Check feet  every AM and careful with sores and ulcers on feet bilaterally. Check eyes every year with dilated eye exam.  Check sugars.  2-hour postmeal should be less than 140s.  Pre-meal should be 's.  Both equally affected A1c.  Discussed importance of glycemic control to prevent complications of diabetes  -Discussed complications of diabetes include retinopathy, neuropathy, nephropathy and cardiovascular disease  -Discussed ABCs of DM  -Discussed importance of SBGM  -Discussed importance of low CHO diet, recommend 45gm per meal or 135gm per day  -UTD with optho  -Normotensive        Vaccine counseling.Flu shot today. Covid booster declined by pt.        Elevated PTHrP level Check blood.        Stage 3a chronic kidney disease (HCC) No motrin, ibuprofen, advil, alleve, naprosyn  with these medications.                    Orders Placed This Encounter   Procedures    Magnesium    Microalb/Creat Ratio, Random Urine    TSH W Reflex To Free T4    Free T4, (Free Thyroxine)    Comp Metabolic Panel (14)    PTH-Related  Peptide (Pth-Rp)    High Dose Fluzone trivalent influenza, 65yrs+ PFS (73269)       Meds This Visit:  Requested Prescriptions      No prescriptions requested or ordered in this encounter       Imaging & Referrals:  INFLUENZA VAC HIGH DOSE PRSV FREE        RTC 3 months for FU DM.     RTC 6-13-25 for physical.          [1]   Allergies  Allergen Reactions    Shellfish-Derived Products UNKNOWN

## 2025-02-11 RX ORDER — LEVOTHYROXINE SODIUM 50 UG/1
50 TABLET ORAL
Qty: 90 TABLET | Refills: 3 | Status: SHIPPED | OUTPATIENT
Start: 2025-02-11 | End: 2025-02-12 | Stop reason: DRUGHIGH

## 2025-02-11 NOTE — ED PROVIDER NOTES
Addended by: JOSE DE JESUS WHITNEY on: 2/11/2025 05:13 PM     Modules accepted: Orders     Patient Seen in: [facility] 5sw/se    History   Patient presents with:  Postop/Procedure Problem    Stated Complaint: post op procedure    HPI    18-year-old male presents for evaluation of right flank pain.   Patient had cystoscopy with left urete presenting problem. Social History    Tobacco Use      Smoking status: Former Smoker        Packs/day: 1.00        Types: Cigarettes        Quit date: 1998        Years since quittin.7      Smokeless tobacco: Never Used    Alcohol use:  Yes 25 (*)     Creatinine 1.61 (*)     Calculated Osmolality 300 (*)     GFR, Non- 41 (*)     GFR, -American 47 (*)     All other components within normal limits   URINALYSIS WITH CULTURE REFLEX - Abnormal; Notable for the following comp unremarkable. Unenhanced abdominal aorta shows no evidence for aneurysm or surrounding soft tissue stranding or fluid.     Radiology exams  Viewed and reviewed by myself and findings discussed with patient including need for follow up        DHRUV Joyce

## 2025-02-11 NOTE — TELEPHONE ENCOUNTER
Please review; protocol failed/No Protocol    Sent [employer] message to patient to complete labs from 12/12/2024    Requested Prescriptions   Pending Prescriptions Disp Refills    LEVOTHYROXINE 50 MCG Oral Tab [Pharmacy Med Name: Levothyroxine Sodium Oral Tablet 50 MCG] 90 tablet 3     Sig: TAKE 1 TABLET BEFORE BREAKFAST.       Thyroid Medication Protocol Failed - 2/11/2025  2:04 PM        Failed - TSH in past 12 months        Passed - Last TSH value is normal     Lab Results   Component Value Date    TSH 2.040 08/16/2023    THYROIDFUNC 4.78 05/31/2016                 Passed - In person appointment or virtual visit in the past 12 mos or appointment in next 3 mos     Recent Outpatient Visits              2 months ago [facility]normal TSH    [facility]    Office Visit    8 months ago Type 2 diabetes mellitus without complication, without long-term current use of insulin ([facility])    [facility]    Office Visit    11 months ago Acute right-sided low back pain with sciatica, sciatica laterality unspecified    [facility]    Office Visit    1 year ago Abnormal computed tomography of ureter    [facility]    Office Visit    1 year ago Stage 3b chronic kidney disease (HCC)    [facility]    [facility]          Future Appointments         Provider Department Appt Notes    In 1 month [facility] arriola    In 4 months [facility] 6-13-24                    Passed - Medication is active on med list         Refused Prescriptions Disp Refills    METFORMIN HCL 1000 MG Oral Tab [Pharmacy Med Name:  metFORMIN HCl Oral Tablet 1000 MG] 180 tablet 3     Sig: TAKE 1 TABLET TWICE DAILY       Diabetes Medication Protocol Failed - 2/11/2025  2:04 PM        Failed - Last A1C < 7.5 and within past 6 months     Lab Results   Component Value Date    A1C 8.1 (H) 10/21/2024             Passed - In person appointment or virtual visit in the past 6 mos or appointment in next 3 mos     Recent Outpatient Visits              2 months ago [facility]    Office Visit    8 months ago Type 2 diabetes mellitus without complication, without long-term current use of insulin ([facility]    Office Visit    11 months ago Acute right-sided low back pain with sciatica, sciatica laterality unspecified    [facility]    Office Visit    1 year ago Abnormal computed tomography of ureter    [facility]    Office Visit    1 year ago Stage 3b chronic kidney disease (HCC)    [facility]    [facility]          Future Appointments         Provider Department Appt Notes    In 1 month [facility] dr arriola    In 4 months [facility]x 6-13-24                    Passed - Microalbumin procedure in past 12 months or taking ACE/ARB        Passed - EGFRCR or GFRNAA > 50     GFR Evaluation  EGFRCR: 55 , resulted on 2/25/2024          Passed - GFR in the past 12 months        Passed - Medication is active on med list           Future Appointments         Provider Department Appt Notes    In 1 month Janiya Griggs MD [facility] [place] per dr arriola    In 4  Janiya Gil MD [facility] last px 6-13-24          Recent Outpatient Visits              2 months ago [facility]    [facility] Visit    8 months ago Type 2 diabetes mellitus without complication, without long-term current use of insulin ([facility]    Office Visit    11 months ago Acute right-sided low back pain with sciatica, sciatica laterality unspecified    [facility] Debora Salas MD    Office Visit    1 year ago Abnormal computed tomography of ureter    [facility]    Office Visit    1 year ago Stage 3b chronic kidney disease (HCC)    [facility]    Telemedicine

## 2025-02-12 ENCOUNTER — LAB ENCOUNTER (OUTPATIENT)
Dept: LAB | Age: 85
End: 2025-02-12
Attending: INTERNAL MEDICINE
Payer: MEDICARE

## 2025-02-12 DIAGNOSIS — D50.8 OTHER IRON DEFICIENCY ANEMIA: ICD-10-CM

## 2025-02-12 DIAGNOSIS — N18.31 STAGE 3A CHRONIC KIDNEY DISEASE (HCC): Chronic | ICD-10-CM

## 2025-02-12 DIAGNOSIS — R79.89 ABNORMAL TSH: ICD-10-CM

## 2025-02-12 DIAGNOSIS — E11.9 TYPE 2 DIABETES MELLITUS WITHOUT COMPLICATION, WITHOUT LONG-TERM CURRENT USE OF INSULIN (HCC): ICD-10-CM

## 2025-02-12 DIAGNOSIS — R93.89 ABNORMAL FINDINGS ON DIAGNOSTIC IMAGING OF OTHER SPECIFIED BODY STRUCTURES: ICD-10-CM

## 2025-02-12 DIAGNOSIS — E83.42 HYPOMAGNESEMIA: ICD-10-CM

## 2025-02-12 DIAGNOSIS — R94.6 ABNORMAL RESULTS OF THYROID FUNCTION STUDIES: ICD-10-CM

## 2025-02-12 LAB
ALBUMIN SERPL-MCNC: 4.8 G/DL (ref 3.2–4.8)
ALBUMIN/GLOB SERPL: 2.3 {RATIO} (ref 1–2)
ALP LIVER SERPL-CCNC: 81 U/L
ALT SERPL-CCNC: 29 U/L
ANION GAP SERPL CALC-SCNC: 10 MMOL/L (ref 0–18)
AST SERPL-CCNC: 25 U/L (ref ?–34)
BASOPHILS # BLD AUTO: 0.06 X10(3) UL (ref 0–0.2)
BASOPHILS NFR BLD AUTO: 0.9 %
BILIRUB SERPL-MCNC: 0.3 MG/DL (ref 0.2–1.1)
BUN BLD-MCNC: 21 MG/DL (ref 9–23)
BUN/CREAT SERPL: 13.4 (ref 10–20)
CALCIUM BLD-MCNC: 9.9 MG/DL (ref 8.7–10.4)
CHLORIDE SERPL-SCNC: 103 MMOL/L (ref 98–112)
CO2 SERPL-SCNC: 27 MMOL/L (ref 21–32)
CREAT BLD-MCNC: 1.57 MG/DL
DEPRECATED HBV CORE AB SER IA-ACNC: 19 NG/ML
DEPRECATED RDW RBC AUTO: 45.5 FL (ref 35.1–46.3)
EGFRCR SERPLBLD CKD-EPI 2021: 43 ML/MIN/1.73M2 (ref 60–?)
EOSINOPHIL # BLD AUTO: 0.84 X10(3) UL (ref 0–0.7)
EOSINOPHIL NFR BLD AUTO: 12.8 %
ERYTHROCYTE [DISTWIDTH] IN BLOOD BY AUTOMATED COUNT: 12.9 % (ref 11–15)
EST. AVERAGE GLUCOSE BLD GHB EST-MCNC: 171 MG/DL (ref 68–126)
FASTING STATUS PATIENT QL REPORTED: YES
GLOBULIN PLAS-MCNC: 2.1 G/DL (ref 2–3.5)
GLUCOSE BLD-MCNC: 145 MG/DL (ref 70–99)
HBA1C MFR BLD: 7.6 % (ref ?–5.7)
HCT VFR BLD AUTO: 43 %
HGB BLD-MCNC: 14 G/DL
IMM GRANULOCYTES # BLD AUTO: 0.01 X10(3) UL (ref 0–1)
IMM GRANULOCYTES NFR BLD: 0.2 %
IRON SATN MFR SERPL: 25 %
IRON SERPL-MCNC: 91 UG/DL
LYMPHOCYTES # BLD AUTO: 1.4 X10(3) UL (ref 1–4)
LYMPHOCYTES NFR BLD AUTO: 21.3 %
MAGNESIUM SERPL-MCNC: 1.7 MG/DL (ref 1.6–2.6)
MCH RBC QN AUTO: 31.5 PG (ref 26–34)
MCHC RBC AUTO-ENTMCNC: 32.6 G/DL (ref 31–37)
MCV RBC AUTO: 96.6 FL
MONOCYTES # BLD AUTO: 0.46 X10(3) UL (ref 0.1–1)
MONOCYTES NFR BLD AUTO: 7 %
NEUTROPHILS # BLD AUTO: 3.81 X10 (3) UL (ref 1.5–7.7)
NEUTROPHILS # BLD AUTO: 3.81 X10(3) UL (ref 1.5–7.7)
NEUTROPHILS NFR BLD AUTO: 57.8 %
OSMOLALITY SERPL CALC.SUM OF ELEC: 296 MOSM/KG (ref 275–295)
PLATELET # BLD AUTO: 307 10(3)UL (ref 150–450)
POTASSIUM SERPL-SCNC: 5.2 MMOL/L (ref 3.5–5.1)
PROT SERPL-MCNC: 6.9 G/DL (ref 5.7–8.2)
RBC # BLD AUTO: 4.45 X10(6)UL
SODIUM SERPL-SCNC: 140 MMOL/L (ref 136–145)
T4 FREE SERPL-MCNC: 1.2 NG/DL (ref 0.8–1.7)
TOTAL IRON BINDING CAPACITY: 366 UG/DL (ref 250–425)
TRANSFERRIN SERPL-MCNC: 298 MG/DL (ref 215–365)
TSI SER-ACNC: 5.45 UIU/ML (ref 0.55–4.78)
WBC # BLD AUTO: 6.6 X10(3) UL (ref 4–11)

## 2025-02-12 PROCEDURE — 82728 ASSAY OF FERRITIN: CPT

## 2025-02-12 PROCEDURE — 80053 COMPREHEN METABOLIC PANEL: CPT

## 2025-02-12 PROCEDURE — 84443 ASSAY THYROID STIM HORMONE: CPT

## 2025-02-12 PROCEDURE — 84439 ASSAY OF FREE THYROXINE: CPT

## 2025-02-12 PROCEDURE — 36415 COLL VENOUS BLD VENIPUNCTURE: CPT

## 2025-02-12 PROCEDURE — 85025 COMPLETE CBC W/AUTO DIFF WBC: CPT

## 2025-02-12 PROCEDURE — 83735 ASSAY OF MAGNESIUM: CPT

## 2025-02-12 PROCEDURE — 82542 COL CHROMOTOGRAPHY QUAL/QUAN: CPT | Performed by: INTERNAL MEDICINE

## 2025-02-12 PROCEDURE — 84466 ASSAY OF TRANSFERRIN: CPT

## 2025-02-12 PROCEDURE — 83036 HEMOGLOBIN GLYCOSYLATED A1C: CPT

## 2025-02-12 PROCEDURE — 83540 ASSAY OF IRON: CPT

## 2025-02-13 NOTE — PROGRESS NOTES
CBC Normal (white blood cells and red blood cells and platelets), iron storage is lower than prior.  Rest of iron levels look okay.  The only concern is where is he losing blood from.  Would have him recommend follow-up with GI.  To look for other causes of anemia.  Would recommend iron 65 once a day with vitamin C 500 once a day and folic acid 1 mg a day.  Recheck iron levels in 3 months.  Orders written.  CMP Normal (electrolytes, and liver functions), except potassium is slightly elevated.  And kidney function is stable but declined.No motrin, ibuprofen, advil, alleve, naprosyn  with these medications.  Magnesium level is okay.  Not sure that the lisinopril is causing the potassium elevation.  Would recheck another potassium in 1 week with at least 8 ounces of water prior to retesting.  If still elevated will need to decrease the lisinopril.  If not elevated then we will continue with the lisinopril for right now there is some kidney protection benefits from lisinopril.  Thyroid is underactive.  Would have him increase his levothyroxine to 75 mcg and recheck in 4 to 6 weeks.  Prescription will be sent.  Hemoglobin A1c which is a 3-month average of sugars is slightly better but goal is less than 7.Careful with diet and excercise at least 30 minutes 3-4 times a week. Check sugars at different times on different dates. Careful with low sugars. Carry something with you and check sugar if can. Can carry ye cracker, etc. Decrease carbohydrates. But also, careful with fruits and natural sugars.One serving a day and no more than 1 handful every day. Check feet  every AM and careful with sores and ulcers on feet bilaterally. Check eyes every year with dilated eye exam.  Check sugars.  2-hour postmeal should be less than 140s.  Pre-meal should be 's.  Both equally affected A1c.  Recheck A1c in 3 months.  Orders written.  Compliant with taking glimepiride?  Has he missed any doses?  If he is consistent with taking  the glimepiride would increase to 1 tablet prebreakfast 1 tablet prelunch and 2 tablets predinner which is the maximum dose.  Careful with low sugars.  Call us back with sugars in 1 to 2 weeks.  If he is missing doses just resume the dosing that he supposed to be on and see if there is way we can help with remembering and call in 2 weeks with sugars.

## 2025-02-16 LAB — PTH-RELATED PEPTIDE: <2 PMOL/L

## 2025-03-13 ENCOUNTER — OFFICE VISIT (OUTPATIENT)
Dept: INTERNAL MEDICINE CLINIC | Facility: CLINIC | Age: 85
End: 2025-03-13
Payer: MEDICARE

## 2025-03-13 VITALS
BODY MASS INDEX: 27.34 KG/M2 | HEART RATE: 69 BPM | SYSTOLIC BLOOD PRESSURE: 137 MMHG | DIASTOLIC BLOOD PRESSURE: 72 MMHG | OXYGEN SATURATION: 100 % | TEMPERATURE: 97 F | HEIGHT: 68 IN | WEIGHT: 180.38 LBS

## 2025-03-13 DIAGNOSIS — R79.89 ABNORMAL TSH: Primary | ICD-10-CM

## 2025-03-13 DIAGNOSIS — Z71.85 VACCINE COUNSELING: ICD-10-CM

## 2025-03-13 DIAGNOSIS — E78.2 MIXED HYPERLIPIDEMIA: ICD-10-CM

## 2025-03-13 DIAGNOSIS — E83.42 HYPOMAGNESEMIA: ICD-10-CM

## 2025-03-13 DIAGNOSIS — I10 ESSENTIAL HYPERTENSION: ICD-10-CM

## 2025-03-13 DIAGNOSIS — E83.52 HYPERCALCEMIA: ICD-10-CM

## 2025-03-13 DIAGNOSIS — E11.9 TYPE 2 DIABETES MELLITUS WITHOUT COMPLICATION, WITHOUT LONG-TERM CURRENT USE OF INSULIN (HCC): ICD-10-CM

## 2025-03-13 DIAGNOSIS — E87.5 HYPERKALEMIA: ICD-10-CM

## 2025-03-13 DIAGNOSIS — R94.6 ABNORMAL RESULTS OF THYROID FUNCTION STUDIES: ICD-10-CM

## 2025-03-13 DIAGNOSIS — N18.31 STAGE 3A CHRONIC KIDNEY DISEASE (HCC): Chronic | ICD-10-CM

## 2025-03-13 LAB
ANION GAP SERPL CALC-SCNC: 11 MMOL/L (ref 0–18)
BUN BLD-MCNC: 19 MG/DL (ref 9–23)
BUN/CREAT SERPL: 12.6 (ref 10–20)
CALCIUM BLD-MCNC: 9.7 MG/DL (ref 8.7–10.4)
CHLORIDE SERPL-SCNC: 105 MMOL/L (ref 98–112)
CO2 SERPL-SCNC: 25 MMOL/L (ref 21–32)
CREAT BLD-MCNC: 1.51 MG/DL
EGFRCR SERPLBLD CKD-EPI 2021: 46 ML/MIN/1.73M2 (ref 60–?)
FASTING STATUS PATIENT QL REPORTED: YES
GLUCOSE BLD-MCNC: 188 MG/DL (ref 70–99)
OSMOLALITY SERPL CALC.SUM OF ELEC: 299 MOSM/KG (ref 275–295)
POTASSIUM SERPL-SCNC: 5.2 MMOL/L (ref 3.5–5.1)
SODIUM SERPL-SCNC: 141 MMOL/L (ref 136–145)
T4 FREE SERPL-MCNC: 1.2 NG/DL (ref 0.8–1.7)
TSI SER-ACNC: 2.79 UIU/ML (ref 0.55–4.78)

## 2025-03-13 PROCEDURE — 3008F BODY MASS INDEX DOCD: CPT | Performed by: INTERNAL MEDICINE

## 2025-03-13 PROCEDURE — 3078F DIAST BP <80 MM HG: CPT | Performed by: INTERNAL MEDICINE

## 2025-03-13 PROCEDURE — 99214 OFFICE O/P EST MOD 30 MIN: CPT | Performed by: INTERNAL MEDICINE

## 2025-03-13 PROCEDURE — 1160F RVW MEDS BY RX/DR IN RCRD: CPT | Performed by: INTERNAL MEDICINE

## 2025-03-13 PROCEDURE — G2211 COMPLEX E/M VISIT ADD ON: HCPCS | Performed by: INTERNAL MEDICINE

## 2025-03-13 PROCEDURE — 1159F MED LIST DOCD IN RCRD: CPT | Performed by: INTERNAL MEDICINE

## 2025-03-13 PROCEDURE — G0009 ADMIN PNEUMOCOCCAL VACCINE: HCPCS | Performed by: INTERNAL MEDICINE

## 2025-03-13 PROCEDURE — 1126F AMNT PAIN NOTED NONE PRSNT: CPT | Performed by: INTERNAL MEDICINE

## 2025-03-13 PROCEDURE — 90677 PCV20 VACCINE IM: CPT | Performed by: INTERNAL MEDICINE

## 2025-03-13 PROCEDURE — 3075F SYST BP GE 130 - 139MM HG: CPT | Performed by: INTERNAL MEDICINE

## 2025-03-13 NOTE — PATIENT INSTRUCTIONS
ASSESSMENT/PLAN:     Encounter Diagnoses   Name Primary?    Abnormal TSH Check blood.    Yes    Essential hypertension Stable. Careful with diet and excercise at least 30 minutes 3-4 times a week. Check blood pressures at different times on different days. Can purchase own blood pressure monitor. If not, check at local pharmacy. Bake foods more and grill occasionally. Avoid fried foods. No salt. Use other seasonings.         Hypercalcemia Check blood after 5-12-25.        Hyperkalemia Check blood.        Mixed hyperlipidemia Stable.        Type 2 diabetes mellitus without complication, without long-term current use of insulin (HCC) Higher. Check urine. Careful with diet and excercise at least 30 minutes 3-4 times a week. Check sugars at different times on different dates. Careful with low sugars. Carry something with you and check sugar if can. Can carry ye cracker, etc. Decrease carbohydrates. But also, careful with fruits and natural sugars.One serving a day and no more than 1 handful every day. Check feet  every AM and careful with sores and ulcers on feet bilaterally. Check eyes every year with dilated eye exam.  Check sugars.  2-hour postmeal should be less than 140s.  Pre-meal should be 's.  Both equally affected A1c.  Discussed importance of glycemic control to prevent complications of diabetes  -Discussed complications of diabetes include retinopathy, neuropathy, nephropathy and cardiovascular disease  -Discussed ABCs of DM  -Discussed importance of SBGM  -Discussed importance of low CHO diet, recommend 45gm per meal or 135gm per day  -UTD with optho  -Normotensive        Vaccine counseling PCV 20 today. Recommend RSV vaccine.  Would check on insurance coverage at local pharmacy.  RSV is a one-time vaccine as of now.  Potential side effects with RSV vaccine are low-grade fevers body aches etc.  Also would recommend flu shot.  Separate from RSV vaccine by 2 weeks.  Also would recommend new  COVID-vaccine.  Separate from other 2 vaccines by 2 weeks. Respiratory Syncytial Virus (RSV)  What is respiratory syncytial virus?   Respiratory syncytial virus, or RSV, is a common virus that affects the lungs and breathing passages.  What are the symptoms?   Symptoms of RSV typically include mild cold-like symptoms including runny nose, sore throat, cough, and headache. Sometimes RSV can lead to serious complications including:   Pneumonia (infection of the lungs)  More severe symptoms for people with asthma  More severe symptoms for people with chronic obstructive pulmonary disease (COPD) (a chronic disease of the lungs that makes it hard to breathe)  Congestive heart failure (when the heart can't pump blood and oxygen to the body's tissues)  Older adults who get very sick from RSV may need to be hospitalized. Some may even die. Older adults are at greater risk than young adults for serious complications from RSV because the immune system weakens as one gets older.  Who is most at risk for complications?  RSV infections can be dangerous for infants and certain adults. Those at highest risk for severe RSV infection include:  Infants under 1 year of age  Older adults, especially those 65 years and older  Adults with chronic heart or lung disease  Adults with weakened immune systems  How is it treated?  There is no specific treatment for RSV infection, though researchers are working to develop vaccines and antivirals (medicines that fight viruses).  How can I protect others if I have RSV?  RSV season occurs each year in most regions of the [place] during fall, winter, and spring. If you are at high risk for severe RSV infection, or if you interact with an infant or older adult, you should take extra care to stay healthy:   Wash your hands often - Wash your hands often with soap and water for 20 seconds. If soap and water are not available, use an alcohol-based hand . Washing your hands will help protect you  from germs.  Keep your hands off your face - Avoid touching your eyes, nose, and mouth with unwashed hands. Germs spread this way.   Avoid close contact with sick people - Avoid close contact, such as kissing, and sharing cups or eating utensils with people who have cold-like symptoms.  Cover your coughs and sneezes - Cover your mouth and nose with a tissue when coughing or sneezing. Throw the tissue in the trash afterward.  Clean and disinfect surfaces - Clean and disinfect surfaces that people frequently touch, such as doorknobs. When people infected with RSV touch surfaces and objects, they can leave behind germs. Also, when they cough or sneeze, droplets containing germs can land on surfaces and objects.   Stay home when you are sick - If possible, stay home from work, school, and public areas when you are sick. This will help protect others from catching your illness.    Centers for Disease Control & Prevention (CDC)  RSV Symptoms & Care, https://www.cdc.gov/rsv/factsheet-older-adults.pdf        Hypomagnesemia Check blood.        Stage 3a chronic kidney disease (HCC) No motrin, ibuprofen, advil, alleve, naprosyn  with these medications.  Hydrate at least 48 oz. water a day. Check blood.       Anemia. Increase iron 3 times a day and vitamin C 500 a day and Folic acid 1 mg a day. Has scheduled U with GI.     Orders Placed This Encounter   Procedures    Microalb/Creat Ratio, Random Urine       Meds This Visit:  Requested Prescriptions      No prescriptions requested or ordered in this encounter       Imaging & Referrals:  None      Has set rhonda't.    Statement Selected

## 2025-03-13 NOTE — PROGRESS NOTES
HPI:    Patient ID: Israel Cornelius is a 84 year old male.    Chief Complaint   Patient presents with    Follow - Up     Patient states he is here for an follow up         Patient here for follow up of Diabetes.  Has been taking medications regularly.    Checks sugars 1 times 3-4 week.   Fasting sugars average  1110-120's. Yesterday soda bread felt sugar at 5 PM. [facility]. eats Nutrabran. Normally, no lows.   Watches diabetic diet, low salt.  Diabetic Flow sheet      Latest Ref Rng & Units 3/13/2025    12:11 PM 3/13/2025    11:53 AM 2/12/2025     8:54 AM 12/12/2024     4:42 PM   DIABETIC FLOWSHEET ([facility] hospital)   BMI   27.43 kg/m2     Hgb A1c <5.7 %   7.6     Microalbumin Urine mg/dL    <0.30    Microalb/Creatinine Calc     --    HEMOGLOBIN A1c <5.7 %   7.6     Lisinopril  5 mg Daily OR 5 mg Daily OR  5 mg Daily OR  5 mg Daily OR    Weight (enc vitals)   180 lb 6.4 oz     BP (enc vitals)   137/72        HISTORY OF DIABETES COMPLICATIONS: :  History of Retinopathy: No.   History of Neuropathy: No.   History of Nephropathy: Yes.      ASSOCIATED COMPLICATIONS:   HTN: Yes.   Hyperlipidemia: Yes.   Coronary Artery Disease:  CAD,  HF, PAD  Cerebrovascular Disease: No.      MEALS:  3 meals a day  Cooks at home    Hypertension  Patient is here for follow up of hypertension. BP at home:   Has been compliant with medications.  Exercise level: moderately active (treadmill X 30 minutes 3 times a week) and has been following low salt diet.  Weight has been stable. Cooks more. No added salt after wards.   Wt Readings from Last 3 Encounters:   03/13/25 180 lb 6.4 oz (81.8 kg)   12/12/24 181 lb 3.2 oz (82.2 kg)   10/29/24 183 lb 11.2 oz (83.3 kg)     BP Readings from Last 3 Encounters:   03/13/25 137/72   12/12/24 124/73   06/13/24 124/70     Labs:   Lab Results   Component Value Date/Time     (H) 02/12/2025 08:54 AM     02/12/2025 08:54 AM    K 5.2 (H) 02/12/2025 08:54 AM     02/12/2025 08:54 AM    CO2 27.0  02/12/2025 08:54 AM    CREATSERUM 1.57 (H) 02/12/2025 08:54 AM    CA 9.9 02/12/2025 08:54 AM    AST 25 02/12/2025 08:54 AM    ALT 29 02/12/2025 08:54 AM    TSH 5.449 (H) 02/12/2025 08:54 AM    T4F 1.2 02/12/2025 08:54 AM        Lab Results   Component Value Date/Time    CHOLEST 121 10/21/2024 09:50 AM    HDL 39 (L) 10/21/2024 09:50 AM    TRIG 157 (H) 10/21/2024 09:50 AM    LDL 55 10/21/2024 09:50 AM    NONHDLC 82 10/21/2024 09:50 AM            Wt Readings from Last 3 Encounters:   03/13/25 180 lb 6.4 oz (81.8 kg)   12/12/24 181 lb 3.2 oz (82.2 kg)   10/29/24 183 lb 11.2 oz (83.3 kg)     BP Readings from Last 3 Encounters:   03/13/25 137/72   12/12/24 124/73   06/13/24 124/70     Labs:   Lab Results   Component Value Date/Time     (H) 02/12/2025 08:54 AM     02/12/2025 08:54 AM    K 5.2 (H) 02/12/2025 08:54 AM     02/12/2025 08:54 AM    CO2 27.0 02/12/2025 08:54 AM    CREATSERUM 1.57 (H) 02/12/2025 08:54 AM    CA 9.9 02/12/2025 08:54 AM    AST 25 02/12/2025 08:54 AM    ALT 29 02/12/2025 08:54 AM    TSH 5.449 (H) 02/12/2025 08:54 AM    T4F 1.2 02/12/2025 08:54 AM        Lab Results   Component Value Date/Time    CHOLEST 121 10/21/2024 09:50 AM    HDL 39 (L) 10/21/2024 09:50 AM    TRIG 157 (H) 10/21/2024 09:50 AM    LDL 55 10/21/2024 09:50 AM    NONHDLC 82 10/21/2024 09:50 AM          HPI      Review of Systems   Constitutional: Negative.  Negative for activity change, appetite change, chills, diaphoresis, fatigue, fever and unexpected weight change.   HENT:  Negative for congestion, mouth sores, postnasal drip, rhinorrhea, sinus pressure, sneezing, sore throat, trouble swallowing and voice change.    Eyes:  Negative for pain.   Respiratory:  Negative for apnea, cough, choking, chest tightness, shortness of breath, wheezing and stridor.    Cardiovascular:  Negative for chest pain, palpitations and leg swelling.   Gastrointestinal:  Negative for diarrhea.   Endocrine: Negative for cold intolerance,  heat intolerance, polydipsia, polyphagia and polyuria.   Musculoskeletal:  Negative for neck pain.   Neurological:  Negative for dizziness, tremors, seizures, syncope, facial asymmetry, speech difficulty, weakness, light-headedness, numbness and headaches.   Psychiatric/Behavioral:  The patient is not nervous/anxious.    All other systems reviewed and are negative.        Current Outpatient Medications   Medication Sig Dispense Refill    levothyroxine 75 MCG Oral Tab Take 1 tablet (75 mcg total) by mouth before breakfast. 90 tablet 1    lisinopril 5 MG Oral Tab Take 1 tablet (5 mg total) by mouth daily. 90 tablet 3    rosuvastatin 20 MG Oral Tab Take 1 tablet (20 mg total) by mouth nightly. 90 tablet 3    ferrous sulfate 325 (65 FE) MG Oral Tab EC Take 1 tablet (325 mg total) by mouth daily with breakfast.      glimepiride 1 MG Oral Tab Take 1 tablet (1 mg total) by mouth 3 (three) times daily. (Patient taking differently: Take 1 tablet (1 mg total) by mouth 3 (three) times daily. 12/13/25- take 2 at dinner) 270 tablet 3    metFORMIN HCl 1000 MG Oral Tab Take 1 tablet (1,000 mg total) by mouth 2 (two) times daily. 180 tablet 3    TRUEplus Lancets 33G Does not apply Misc TEST BLOOD SUGAR ONE TIME DAILY IN THE MORNING 100 each 11    Alcohol Swabs 70 % Does not apply Pads Clean area prior to checking blood glucose daily. 100 each 0    Blood Glucose Monitoring Suppl (TRUE METRIX METER) w/Device Does not apply Kit       Blood Gluc Meter Disp-Strips Does not apply Device Code: E11.9. Checks qam. 50 each 5    NON FORMULARY FISH OIL. Patient doen't know how many mg. He takes it once  a day.      Glucose Blood (ONETOUCH ULTRA) In Vitro Strip Dx. E11.9. Checks qam. 100 each 11    aspirin 81 MG Oral Tab Take 1 tablet (81 mg total) by mouth daily.      Calcium Carbonate-Vitamin D 600-200 MG-UNIT Oral Tab Take by mouth.      Multiple Vitamins-Minerals (MULTIVITAMIN OR) Take by mouth.        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     Prostate cancer (HCC)      Past Surgical History:   Procedure Laterality Date    Appendectomy      Cataract extraction w/ intraocular lens implant Left 2020    [facility] CE LENSX/ORA MAC TORIC OS DR. SCRUGGS  SN6AT4 +18.50    Cholecystectomy      Colonoscopy  2013    osis    Colonoscopy  2020    Colonoscopy N/A 3/4/2020    Procedure: COLONOSCOPY, POSSIBLE BIOPSY, POSSIBLE POLYPECTOMY 74066;  Surgeon: Salvador Perkins MD;  Location: [facility], [facility]    Eg  2013    stricture and large HH w Alonso erosions    Inguinal hernia repair Right     Other surgical history      radical prostatectomy      Family History   Problem Relation Age of Onset    Heart Attack Father         CAD    Cancer Father         lung    Cancer Sister         colon    Other (Other) Mother         alzheimers      Social History:   Social History     Socioeconomic History    Marital status:    Tobacco Use    Smoking status: Former     Current packs/day: 0.00     Types: Cigarettes     Quit date: 1998     Years since quittin.2    Smokeless tobacco: Never   Vaping Use    Vaping status: Never Used   Substance and Sexual Activity    Alcohol use: Yes     Comment: rarely    Drug use: No   Other Topics Concern    Caffeine Concern Yes     Comment: coffee, 2 cups        PHYSICAL EXAM:   /72 (BP Location: Right arm, Patient Position: Sitting, Cuff Size: adult)   Pulse 69   Temp 97.2 °F (36.2 °C) (Temporal)   Ht 5' 8\" (1.727 m)   Wt 180 lb 6.4 oz (81.8 kg)   SpO2 100%   BMI 27.43 kg/m²   BP Readings from Last 3 Encounters:   25 137/72   24 124/73   24 124/70     Wt Readings from Last 3 Encounters:    03/13/25 180 lb 6.4 oz (81.8 kg)   12/12/24 181 lb 3.2 oz (82.2 kg)   10/29/24 183 lb 11.2 oz (83.3 kg)       Physical Exam  Vitals and nursing note reviewed.   Constitutional:       General: He is not in acute distress.     Appearance: He is well-developed and well-groomed. He is not ill-appearing, toxic-appearing or diaphoretic.      Interventions: He is not intubated.  Neck:      Thyroid: No thyromegaly.      Vascular: No JVD.      Trachea: No tracheal deviation.   Cardiovascular:      Rate and Rhythm: Normal rate and regular rhythm.      Pulses: Normal pulses.           Carotid pulses are 2+ on the right side and 2+ on the left side.       Radial pulses are 2+ on the right side and 2+ on the left side.        Dorsalis pedis pulses are 2+ on the right side and 2+ on the left side.        Posterior tibial pulses are 2+ on the right side and 2+ on the left side.      Heart sounds: Normal heart sounds, S1 normal and S2 normal.   Pulmonary:      Effort: Pulmonary effort is normal. No tachypnea, bradypnea, accessory muscle usage, prolonged expiration, respiratory distress or retractions. He is not intubated.      Breath sounds: Normal breath sounds. No stridor, decreased air movement or transmitted upper airway sounds. No decreased breath sounds, wheezing, rhonchi or rales.   Chest:      Chest wall: No tenderness.   Abdominal:      General: Bowel sounds are normal.      Palpations: Abdomen is soft.      Tenderness: There is no abdominal tenderness.   Musculoskeletal:      Right lower leg: No edema.      Left lower leg: No edema.   Skin:     General: Skin is warm and dry.   Neurological:      Mental Status: He is alert and oriented to person, place, and time.   Psychiatric:         Behavior: Behavior normal. Behavior is cooperative.            ASSESSMENT/PLAN:     Encounter Diagnoses   Name Primary?    Abnormal TSH Check blood.    Yes    Essential hypertension Stable. Careful with diet and excercise at least 30  minutes 3-4 times a week. Check blood pressures at different times on different days. Can purchase own blood pressure monitor. If not, check at local pharmacy. Bake foods more and grill occasionally. Avoid fried foods. No salt. Use other seasonings.         Hypercalcemia Check blood after 5-12-25.        Hyperkalemia Check blood.        Mixed hyperlipidemia Stable.        Type 2 diabetes mellitus without complication, without long-term current use of insulin (HCC) Higher. Check urine. Careful with diet and excercise at least 30 minutes 3-4 times a week. Check sugars at different times on different dates. Careful with low sugars. Carry something with you and check sugar if can. Can carry ye cracker, etc. Decrease carbohydrates. But also, careful with fruits and natural sugars.One serving a day and no more than 1 handful every day. Check feet  every AM and careful with sores and ulcers on feet bilaterally. Check eyes every year with dilated eye exam.  Check sugars.  2-hour postmeal should be less than 140s.  Pre-meal should be 's.  Both equally affected A1c.  Discussed importance of glycemic control to prevent complications of diabetes  -Discussed complications of diabetes include retinopathy, neuropathy, nephropathy and cardiovascular disease  -Discussed ABCs of DM  -Discussed importance of SBGM  -Discussed importance of low CHO diet, recommend 45gm per meal or 135gm per day  -UTD with optho  -Normotensive        Vaccine counseling PCV 20 today. Recommend RSV vaccine.  Would check on insurance coverage at local pharmacy.  RSV is a one-time vaccine as of now.  Potential side effects with RSV vaccine are low-grade fevers body aches etc.  Also would recommend flu shot.  Separate from RSV vaccine by 2 weeks.  Also would recommend new COVID-vaccine.  Separate from other 2 vaccines by 2 weeks. Respiratory Syncytial Virus (RSV)  What is respiratory syncytial virus?   Respiratory syncytial virus, or RSV, is a  common virus that affects the lungs and breathing passages.  What are the symptoms?   Symptoms of RSV typically include mild cold-like symptoms including runny nose, sore throat, cough, and headache. Sometimes RSV can lead to serious complications including:   Pneumonia (infection of the lungs)  More severe symptoms for people with asthma  More severe symptoms for people with chronic obstructive pulmonary disease (COPD) (a chronic disease of the lungs that makes it hard to breathe)  Congestive heart failure (when the heart can't pump blood and oxygen to the body's tissues)  Older adults who get very sick from RSV may need to be hospitalized. Some may even die. Older adults are at greater risk than young adults for serious complications from RSV because the immune system weakens as one gets older.  Who is most at risk for complications?  RSV infections can be dangerous for infants and certain adults. Those at highest risk for severe RSV infection include:  Infants under 1 year of age  Older adults, especially those 65 years and older  Adults with chronic heart or lung disease  Adults with weakened immune systems  How is it treated?  There is no specific treatment for RSV infection, though researchers are working to develop vaccines and antivirals (medicines that fight viruses).  How can I protect others if I have RSV?  RSV season occurs each year in most regions of the [place] during fall, winter, and spring. If you are at high risk for severe RSV infection, or if you interact with an infant or older adult, you should take extra care to stay healthy:   Wash your hands often - Wash your hands often with soap and water for 20 seconds. If soap and water are not available, use an alcohol-based hand . Washing your hands will help protect you from germs.  Keep your hands off your face - Avoid touching your eyes, nose, and mouth with unwashed hands. Germs spread this way.   Avoid close contact with sick people - Avoid  close contact, such as kissing, and sharing cups or eating utensils with people who have cold-like symptoms.  Cover your coughs and sneezes - Cover your mouth and nose with a tissue when coughing or sneezing. Throw the tissue in the trash afterward.  Clean and disinfect surfaces - Clean and disinfect surfaces that people frequently touch, such as doorknobs. When people infected with RSV touch surfaces and objects, they can leave behind germs. Also, when they cough or sneeze, droplets containing germs can land on surfaces and objects.   Stay home when you are sick - If possible, stay home from work, school, and public areas when you are sick. This will help protect others from catching your illness.    Centers for Disease Control & Prevention (CDC)  RSV Symptoms & Care, https://www.cdc.gov/rsv/factsheet-older-adults.pdf        Hypomagnesemia Check blood.        Stage 3a chronic kidney disease (HCC) No motrin, ibuprofen, advil, alleve, naprosyn  with these medications.  Hydrate at least 48 oz. water a day. Check blood.       Anemia. Increase iron 3 times a day and vitamin C 500 a day and Folic acid 1 mg a day. Has scheduled FU with GI.     Orders Placed This Encounter   Procedures    Microalb/Creat Ratio, Random Urine       Meds This Visit:  Requested Prescriptions      No prescriptions requested or ordered in this encounter       Imaging & Referrals:  None      Has set rhonda't.        [1]   Allergies  Allergen Reactions    Shellfish-Derived Products UNKNOWN

## 2025-03-14 NOTE — PROGRESS NOTES
CMP Normal (electrolytes, and liver functions), decline in kidney function is stable but declined like prior.No motrin, ibuprofen, advil, alleve, naprosyn  with these medications.  Potassium is slightly low.  Not sure if dehydrated when did the blood work.  Or due to the lisinopril.  Would recommend hydrate at least 48 ounces of water a day recheck in 1 week.  If potassium remains elevated even with hydration then we will need to rediscuss stopping the lisinopril.  Thyroid is good.

## 2025-03-17 RX ORDER — GLIMEPIRIDE 1 MG/1
1 TABLET ORAL 3 TIMES DAILY
Qty: 270 TABLET | Refills: 3 | Status: SHIPPED | OUTPATIENT
Start: 2025-03-17

## 2025-03-29 NOTE — TELEPHONE ENCOUNTER
Patient called and is requesting for script to be sent to pharmacy       [facility] - [place], [place] - [address]       Medication Detail    Medication Quantity Refills Start End   metFORMIN HCl 1000 MG Oral Tab 180 tablet 3 4/4/2024 --   Sig:   Take 1 tablet (1,000 mg total) by mouth 2 (two) times daily.     Route:   Oral     Order #:   [id number]

## 2025-03-31 ENCOUNTER — LAB ENCOUNTER (OUTPATIENT)
Dept: LAB | Age: 85
End: 2025-03-31
Attending: INTERNAL MEDICINE
Payer: MEDICARE

## 2025-03-31 DIAGNOSIS — E87.5 HYPERKALEMIA: ICD-10-CM

## 2025-03-31 LAB
ANION GAP SERPL CALC-SCNC: 7 MMOL/L (ref 0–18)
BUN BLD-MCNC: 22 MG/DL (ref 9–23)
BUN/CREAT SERPL: 13.8 (ref 10–20)
CALCIUM BLD-MCNC: 9.7 MG/DL (ref 8.7–10.4)
CHLORIDE SERPL-SCNC: 106 MMOL/L (ref 98–112)
CO2 SERPL-SCNC: 26 MMOL/L (ref 21–32)
CREAT BLD-MCNC: 1.59 MG/DL
EGFRCR SERPLBLD CKD-EPI 2021: 43 ML/MIN/1.73M2 (ref 60–?)
FASTING STATUS PATIENT QL REPORTED: NO
GLUCOSE BLD-MCNC: 166 MG/DL (ref 70–99)
OSMOLALITY SERPL CALC.SUM OF ELEC: 295 MOSM/KG (ref 275–295)
POTASSIUM SERPL-SCNC: 5 MMOL/L (ref 3.5–5.1)
SODIUM SERPL-SCNC: 139 MMOL/L (ref 136–145)

## 2025-03-31 PROCEDURE — 80048 BASIC METABOLIC PNL TOTAL CA: CPT

## 2025-03-31 PROCEDURE — 36415 COLL VENOUS BLD VENIPUNCTURE: CPT

## 2025-04-01 NOTE — PROGRESS NOTES
BMP Normal (electrolytes), decline in kidney function is stable but still declined.No motrin, ibuprofen, advil, alleve, naprosyn  with these medications.  Hydrate at least 48 ounces of water a day.

## 2025-04-13 ENCOUNTER — HOSPITAL ENCOUNTER (OUTPATIENT)
Age: 85
Discharge: HOME | End: 2025-04-13
Payer: MEDICARE

## 2025-04-13 VITALS
OXYGEN SATURATION: 100 % | DIASTOLIC BLOOD PRESSURE: 77 MMHG | RESPIRATION RATE: 12 BRPM | TEMPERATURE: 98 F | SYSTOLIC BLOOD PRESSURE: 150 MMHG | HEART RATE: 96 BPM

## 2025-04-13 DIAGNOSIS — S61.216A LACERATION OF RIGHT LITTLE FINGER WITHOUT FOREIGN BODY WITHOUT DAMAGE TO NAIL, INITIAL ENCOUNTER: Primary | ICD-10-CM

## 2025-04-13 PROCEDURE — 99213 OFFICE O/P EST LOW 20 MIN: CPT | Performed by: PHYSICIAN ASSISTANT

## 2025-04-13 NOTE — ED PROVIDER NOTES
Patient Seen in: [facility]    History     Chief Complaint   Patient presents with    Laceration/Abrasion     Stated Complaint: L- FINGER INJURY    Subjective:   HPI  84-year-old male who is right-hand dominant presents for evaluation of a laceration to the left fifth digit which occurred just prior to arrival.  Patient was using a saw to trim a tree trunk when he accidentally poked his finger on the return.  Mild associated pain to the laceration. Bleeding is controlled. Had a tetanus shot 5 years ago.      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     Prostate cancer (HCC)              Past Surgical History:   Procedure Laterality Date    Appendectomy      Cataract extraction w/ intraocular lens implant Left 2020    [facility] CE LENSX/ORA MAC TORIC OS DR. SCRUGGS  SN6AT4 +18.50    Cholecystectomy      Colonoscopy  2013    osis    Colonoscopy  2020    Colonoscopy N/A 3/4/2020    Procedure: COLONOSCOPY, POSSIBLE BIOPSY, POSSIBLE POLYPECTOMY 29535;  Surgeon: Salvador Perkins MD;  Location: [facility]    Egd  2013    stricture and large HH w Alonso erosions    Inguinal hernia repair Right     Other surgical history      radical prostatectomy                Social History     Socioeconomic History    Marital status:    Tobacco Use    Smoking status: Former     Current packs/day: 0.00     Types: Cigarettes     Quit date: 1998     Years since quittin.2    Smokeless tobacco: Never   Vaping Use    Vaping status: Never Used   Substance and Sexual Activity    Alcohol use: Yes     Comment: rarely    Drug use: No   Other Topics Concern    Caffeine Concern Yes     Comment: coffee, 2 cups              Review of Systems    Positive for stated  complaint: L- FINGER INJURY  Other systems are as noted in HPI.  Constitutional and vital signs reviewed.      All other systems reviewed and negative except as noted above.    Physical Exam     ED Triage Vitals [04/13/25 1426]   /77   Pulse 96   Resp 12   Temp 97.5 °F (36.4 °C)   Temp src Oral   SpO2 100 %   O2 Device None (Room air)       Current Vitals:   Vital Signs  BP: 150/77  Pulse: 96  Resp: 12  Temp: 97.5 °F (36.4 °C)  Temp src: Oral    Oxygen Therapy  SpO2: 100 %  O2 Device: None (Room air)        Physical Exam  Vitals and nursing note reviewed.   Constitutional:       General: He is not in acute distress.  HENT:      Head: Normocephalic and atraumatic.      Right Ear: External ear normal.      Left Ear: External ear normal.      Nose: Nose normal.      Mouth/Throat:      Mouth: Mucous membranes are moist.   Eyes:      Extraocular Movements: Extraocular movements intact.      Pupils: Pupils are equal, round, and reactive to light.   Cardiovascular:      Rate and Rhythm: Normal rate.   Pulmonary:      Effort: Pulmonary effort is normal.   Abdominal:      General: Abdomen is flat.   Musculoskeletal:         General: Normal range of motion.        Hands:       Cervical back: Normal range of motion.      Comments: Small superficial laceration to the finger pad of 5th digit   Skin:     General: Skin is warm.   Neurological:      General: No focal deficit present.      Mental Status: He is alert and oriented to person, place, and time.   Psychiatric:         Mood and Affect: Mood normal.         Behavior: Behavior normal.             ED Course   Labs Reviewed - No data to display     84-year-old male presenting for evaluation of a laceration to the left fifth digit which occurred just prior to arrival.  Patient has a small fairly superficial wound to the finger pad of the left fifth digit.  This wound was thoroughly irrigated, explored.  No foreign body, no concern for tendon injury.   Wound approximately  1 cm, irregular.  I placed 2 small 1/8 inch Steri-Strips over the wound with approximation of edges.  Local wound care and return precautions reviewed.    Ddx-finger laceration, foreign body, tendon injury                         MDM              Medical Decision Making      Disposition and Plan     Clinical Impression:  1. Laceration of right little finger without foreign body without damage to nail, initial encounter         Disposition:  Discharge  4/13/2025  2:58 pm    Follow-up:  Janiya Griggs MD  [address] [place] [id number]  [phone]                Medications Prescribed:  Discharge Medication List as of 4/13/2025  3:01 PM          Supplementary Documentation:

## 2025-06-19 ENCOUNTER — OFFICE VISIT (OUTPATIENT)
Dept: INTERNAL MEDICINE CLINIC | Facility: CLINIC | Age: 85
End: 2025-06-19
Payer: MEDICARE

## 2025-06-19 VITALS
DIASTOLIC BLOOD PRESSURE: 78 MMHG | SYSTOLIC BLOOD PRESSURE: 127 MMHG | OXYGEN SATURATION: 100 % | TEMPERATURE: 97 F | BODY MASS INDEX: 27.79 KG/M2 | HEIGHT: 68 IN | WEIGHT: 183.38 LBS | HEART RATE: 78 BPM

## 2025-06-19 DIAGNOSIS — R93.41 ABNORMAL COMPUTED TOMOGRAPHY OF URETER: ICD-10-CM

## 2025-06-19 DIAGNOSIS — G89.29 CHRONIC PAIN OF RIGHT KNEE: ICD-10-CM

## 2025-06-19 DIAGNOSIS — E87.5 HYPERKALEMIA: ICD-10-CM

## 2025-06-19 DIAGNOSIS — N18.31 STAGE 3A CHRONIC KIDNEY DISEASE (HCC): Chronic | ICD-10-CM

## 2025-06-19 DIAGNOSIS — R79.89 ABNORMAL TSH: Primary | ICD-10-CM

## 2025-06-19 DIAGNOSIS — Z71.85 VACCINE COUNSELING: ICD-10-CM

## 2025-06-19 DIAGNOSIS — E11.9 TYPE 2 DIABETES MELLITUS WITHOUT COMPLICATION, WITHOUT LONG-TERM CURRENT USE OF INSULIN (HCC): ICD-10-CM

## 2025-06-19 DIAGNOSIS — D80.1 HYPOGAMMAGLOBULINEMIA (HCC): ICD-10-CM

## 2025-06-19 DIAGNOSIS — D50.8 OTHER IRON DEFICIENCY ANEMIA: ICD-10-CM

## 2025-06-19 DIAGNOSIS — Z85.46 PERSONAL HISTORY OF PROSTATE CANCER: ICD-10-CM

## 2025-06-19 DIAGNOSIS — R79.89 ELEVATED PTHRP LEVEL: ICD-10-CM

## 2025-06-19 DIAGNOSIS — E78.2 MIXED HYPERLIPIDEMIA: ICD-10-CM

## 2025-06-19 DIAGNOSIS — M25.561 CHRONIC PAIN OF RIGHT KNEE: ICD-10-CM

## 2025-06-19 DIAGNOSIS — H40.003 GLAUCOMA SUSPECT, BILATERAL: ICD-10-CM

## 2025-06-19 DIAGNOSIS — M48.062 LUMBAR STENOSIS WITH NEUROGENIC CLAUDICATION: ICD-10-CM

## 2025-06-19 DIAGNOSIS — D73.5 SPLENIC INFARCT: ICD-10-CM

## 2025-06-19 DIAGNOSIS — R91.1 PULMONARY NODULE: ICD-10-CM

## 2025-06-19 DIAGNOSIS — Z80.0 FAMILY HISTORY OF COLON CANCER: ICD-10-CM

## 2025-06-19 DIAGNOSIS — E55.9 VITAMIN D DEFICIENCY: ICD-10-CM

## 2025-06-19 DIAGNOSIS — E83.42 HYPOMAGNESEMIA: ICD-10-CM

## 2025-06-19 DIAGNOSIS — Z91.013 ALLERGY TO SEAFOOD: ICD-10-CM

## 2025-06-19 DIAGNOSIS — R76.11 POSITIVE PPD: ICD-10-CM

## 2025-06-19 DIAGNOSIS — E83.52 HYPERCALCEMIA: ICD-10-CM

## 2025-06-19 DIAGNOSIS — H04.123 DRY EYE SYNDROME OF BILATERAL LACRIMAL GLANDS: ICD-10-CM

## 2025-06-19 DIAGNOSIS — H25.811 COMBINED FORMS OF AGE-RELATED CATARACT OF RIGHT EYE: ICD-10-CM

## 2025-06-19 DIAGNOSIS — R93.5 ABNORMAL CT OF THE ABDOMEN: ICD-10-CM

## 2025-06-19 DIAGNOSIS — Z00.00 ENCOUNTER FOR ANNUAL HEALTH EXAMINATION: ICD-10-CM

## 2025-06-19 PROBLEM — D04.9 SQUAMOUS CELL CARCINOMA IN SITU (SCCIS) OF SKIN: Status: RESOLVED | Noted: 2023-09-07 | Resolved: 2025-06-19

## 2025-06-19 PROBLEM — M21.379 FOOTDROP: Status: RESOLVED | Noted: 2020-07-16 | Resolved: 2025-06-19

## 2025-06-19 LAB
APPEARANCE: CLEAR
BASOPHILS # BLD AUTO: 0.04 X10(3) UL (ref 0–0.2)
BASOPHILS NFR BLD AUTO: 0.8 %
BILIRUBIN: NEGATIVE
CREAT UR-SCNC: 97.3 MG/DL
DEPRECATED HBV CORE AB SER IA-ACNC: 20 NG/ML (ref 50–336)
DEPRECATED RDW RBC AUTO: 49.3 FL (ref 35.1–46.3)
EOSINOPHIL # BLD AUTO: 0.37 X10(3) UL (ref 0–0.7)
EOSINOPHIL NFR BLD AUTO: 7.2 %
ERYTHROCYTE [DISTWIDTH] IN BLOOD BY AUTOMATED COUNT: 13.3 % (ref 11–15)
EST. AVERAGE GLUCOSE BLD GHB EST-MCNC: 180 MG/DL (ref 68–126)
GLUCOSE (URINE DIPSTICK): 500 MG/DL
HBA1C MFR BLD: 7.9 % (ref ?–5.7)
HCT VFR BLD AUTO: 42.1 % (ref 39–53)
HGB BLD-MCNC: 13.4 G/DL (ref 13–17.5)
IMM GRANULOCYTES # BLD AUTO: 0.01 X10(3) UL (ref 0–1)
IMM GRANULOCYTES NFR BLD: 0.2 %
IRON SATN MFR SERPL: 29 % (ref 20–50)
IRON SERPL-MCNC: 110 UG/DL (ref 65–175)
KETONES (URINE DIPSTICK): 15 MG/DL
LEUKOCYTES: NEGATIVE
LYMPHOCYTES # BLD AUTO: 1.17 X10(3) UL (ref 1–4)
LYMPHOCYTES NFR BLD AUTO: 22.9 %
MCH RBC QN AUTO: 31.9 PG (ref 26–34)
MCHC RBC AUTO-ENTMCNC: 31.8 G/DL (ref 31–37)
MCV RBC AUTO: 100.2 FL (ref 80–100)
MICROALBUMIN UR-MCNC: 0.4 MG/DL
MICROALBUMIN/CREAT 24H UR-RTO: 4.1 UG/MG (ref ?–30)
MONOCYTES # BLD AUTO: 0.46 X10(3) UL (ref 0.1–1)
MONOCYTES NFR BLD AUTO: 9 %
MULTISTIX LOT#: ABNORMAL NUMERIC
NEUTROPHILS # BLD AUTO: 3.06 X10 (3) UL (ref 1.5–7.7)
NEUTROPHILS # BLD AUTO: 3.06 X10(3) UL (ref 1.5–7.7)
NEUTROPHILS NFR BLD AUTO: 59.9 %
NITRITE, URINE: NEGATIVE
OCCULT BLOOD: NEGATIVE
PH, URINE: 6.5 (ref 4.5–8)
PLATELET # BLD AUTO: 289 10(3)UL (ref 150–450)
RBC # BLD AUTO: 4.2 X10(6)UL (ref 3.8–5.8)
SPECIFIC GRAVITY: 1.02 (ref 1–1.03)
TOTAL IRON BINDING CAPACITY: 379 UG/DL (ref 250–425)
TRANSFERRIN SERPL-MCNC: 290 MG/DL (ref 215–365)
URINE-COLOR: YELLOW
UROBILINOGEN,SEMI-QN: 0.2 MG/DL (ref 0–1.9)
VIT D+METAB SERPL-MCNC: 81.8 NG/ML (ref 30–100)
WBC # BLD AUTO: 5.1 X10(3) UL (ref 4–11)

## 2025-06-19 PROCEDURE — 83036 HEMOGLOBIN GLYCOSYLATED A1C: CPT | Performed by: INTERNAL MEDICINE

## 2025-06-19 PROCEDURE — 85025 COMPLETE CBC W/AUTO DIFF WBC: CPT | Performed by: INTERNAL MEDICINE

## 2025-06-19 PROCEDURE — 82043 UR ALBUMIN QUANTITATIVE: CPT | Performed by: INTERNAL MEDICINE

## 2025-06-19 PROCEDURE — 84466 ASSAY OF TRANSFERRIN: CPT | Performed by: INTERNAL MEDICINE

## 2025-06-19 PROCEDURE — 82570 ASSAY OF URINE CREATININE: CPT | Performed by: INTERNAL MEDICINE

## 2025-06-19 PROCEDURE — 83540 ASSAY OF IRON: CPT | Performed by: INTERNAL MEDICINE

## 2025-06-19 PROCEDURE — 82306 VITAMIN D 25 HYDROXY: CPT | Performed by: INTERNAL MEDICINE

## 2025-06-19 PROCEDURE — 82728 ASSAY OF FERRITIN: CPT | Performed by: INTERNAL MEDICINE

## 2025-06-19 NOTE — PATIENT INSTRUCTIONS
ASSESSMENT/PLAN:     Encounter Diagnoses   Name Primary?    Abnormal TSH Resolved.    Yes    Mixed hyperlipidemia Stable.        Stage 3a chronic kidney disease (HCC) No motrin, ibuprofen, advil, alleve, naprosyn  with these medications.         Vitamin D deficiency Check blood.        Vaccine counseling Recommend RSV vaccine.  Would check on insurance coverage at local pharmacy.  RSV is a one-time vaccine as of now.  Potential side effects with RSV vaccine are low-grade fevers body aches etc.  Also would recommend flu shot.  Separate from RSV vaccine by 2 weeks.  Also would recommend new COVID-vaccine.  Separate from other 2 vaccines by 2 weeks.        Type 2 diabetes mellitus without complication, without long-term current use of insulin (HCC) Stable. Check blood and urine. Careful with diet and excercise at least 30 minutes 3-4 times a week. Check sugars at different times on different dates. Careful with low sugars. Carry something with you and check sugar if can. Can carry ye cracker, etc. Decrease carbohydrates. But also, careful with fruits and natural sugars.One serving a day and no more than 1 handful every day. Check feet  every AM and careful with sores and ulcers on feet bilaterally. Check eyes every year with dilated eye exam.  Check sugars.  2-hour postmeal should be less than 140s.  Pre-meal should be 's.  Both equally affected A1c.  Discussed importance of glycemic control to prevent complications of diabetes  -Discussed complications of diabetes include retinopathy, neuropathy, nephropathy and cardiovascular disease  -Discussed ABCs of DM  -Discussed importance of SBGM  -Discussed importance of low CHO diet, recommend 45gm per meal or 135gm per day  -Normal foot exam.  -FU with optho  -Normotensive   Check urine.       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        Pulmonary nodule Stable.       Positive PPD       Personal history of prostate cancer       Lumbar stenosis with neurogenic claudication  Improved.        Other iron deficiency anemia Check blood.        Hypomagnesemia Resolved.        Hypogammaglobulinemia (HCC)       Hyperkalemia Resolved.        Hypercalcemia Resolved.        Glaucoma suspect, bilateral no new vision changes.  Follow-up with ophthalmology.       Family history of colon cancer up-to-date with screening.       Elevated PTHrP level       Dry eye syndrome of bilateral lacrimal glands no symptoms.  Follow-up with ophthalmology.       Combined forms of age-related cataract of right eye follow-up with ophthalmology.  No new vision changes.       Chronic pain of right knee stable.       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 avoidance.       Abnormal CT of the abdomen     Abnormal computed tomography of ureter Stable.        Encounter for annual health examination check urine.        Orders Placed This Encounter   Procedures    URINALYSIS, AUTO, W/O SCOPE    Vitamin D       Meds This Visit:  Requested Prescriptions      No prescriptions requested or ordered in this encounter       Imaging & Referrals:  None      RTC 6 months for FU.

## 2025-06-19 NOTE — PROGRESS NOTES
HPI:    Patient ID: Israel Cornelius is a 84 year old male.    Israel Cornelius is a 84 year old male who presents for a complete physical exam.   Israel Cornelius is a 84 year old male who presents for a Medicare Assessment.     Chief Complaint   Patient presents with    Wellness Visit     Patient states he is here for an Medicare Wellness Visit.         Patient here for follow up of Diabetes.  Has been taking medications regularly.    Checks sugars every other day BB.  Fasting sugars average 120's. No lows. One time when riding around lows and not eat.   Watches diabetic diet, low salt.  Diabetic Flow sheet      Latest Ref Rng & Units 6/19/2025    10:55 AM 6/19/2025    10:10 AM 6/12/2025     2:37 PM 4/13/2025     3:01 PM 4/13/2025     2:26 PM 11/25/2024    12:00 AM   DIABETIC FLOWSHEET ([facility])   BMI   27.89 kg/m2       Lisinopril  5 mg Daily OR 5 mg Daily OR  5 mg Daily OR  Admitted Admitted 5 mg Daily OR    Weight (enc vitals)   183 lb 6.4 oz       BP (enc vitals)   127/78   150/77    PHQ2 Score    0         Hypertension  Patient is here for follow up of hypertension. BP at home: not check.   Has been compliant with medications.  Exercise level: trying to do more (lifetime fitness 3 days a week X 2 miles) and has been following low salt diet.  Weight has been stable.  Wt Readings from Last 3 Encounters:   06/19/25 183 lb 6.4 oz (83.2 kg)   03/13/25 180 lb 6.4 oz (81.8 kg)   12/12/24 181 lb 3.2 oz (82.2 kg)     BP Readings from Last 3 Encounters:   06/19/25 127/78   04/13/25 150/77   03/13/25 137/72     Labs:   Lab Results   Component Value Date/Time     (H) 03/31/2025 11:22 AM     03/31/2025 11:22 AM    K 5.0 03/31/2025 11:22 AM     03/31/2025 11:22 AM    CO2 26.0 03/31/2025 11:22 AM    CREATSERUM 1.59 (H) 03/31/2025 11:22 AM    CA 9.7 03/31/2025 11:22 AM    AST 25 02/12/2025 08:54 AM    ALT 29 02/12/2025 08:54 AM    TSH 2.785 03/13/2025 12:40 PM    T4F 1.2 03/13/2025 12:40  PM        Lab Results   Component Value Date/Time    CHOLEST 121 10/21/2024 09:50 AM    HDL 39 (L) 10/21/2024 09:50 AM    TRIG 157 (H) 10/21/2024 09:50 AM    LDL 55 10/21/2024 09:50 AM    NONHDLC 82 10/21/2024 09:50 AM          Labs:   Lab Results   Component Value Date/Time    WBC 6.6 02/12/2025 08:54 AM    HGB 14.0 02/12/2025 08:54 AM    .0 02/12/2025 08:54 AM      Lab Results   Component Value Date/Time     (H) 03/31/2025 11:22 AM     03/31/2025 11:22 AM    K 5.0 03/31/2025 11:22 AM     03/31/2025 11:22 AM    CO2 26.0 03/31/2025 11:22 AM    CREATSERUM 1.59 (H) 03/31/2025 11:22 AM    CA 9.7 03/31/2025 11:22 AM    ALB 4.8 02/12/2025 08:54 AM    TP 6.9 02/12/2025 08:54 AM    ALKPHO 81 02/12/2025 08:54 AM    AST 25 02/12/2025 08:54 AM    ALT 29 02/12/2025 08:54 AM    BILT 0.3 02/12/2025 08:54 AM    TSH 2.785 03/13/2025 12:40 PM    T4F 1.2 03/13/2025 12:40 PM        Lab Results   Component Value Date/Time    CHOLEST 121 10/21/2024 09:50 AM    HDL 39 (L) 10/21/2024 09:50 AM    TRIG 157 (H) 10/21/2024 09:50 AM    LDL 55 10/21/2024 09:50 AM    NONHDLC 82 10/21/2024 09:50 AM       Lab Results   Component Value Date/Time    A1C 7.6 (H) 02/12/2025 08:54 AM      Lab Results   Component Value Date    VITD 37.1 08/16/2023         No recommendations at this time    Allergies:  Allergies   Allergen Reactions    Shellfish-Derived Products UNKNOWN     Current Outpatient Medications   Medication Sig Dispense Refill    metFORMIN HCl 1000 MG Oral Tab Take 1 tablet (1,000 mg total) by mouth 2 (two) times daily. 180 tablet 3    glimepiride 1 MG Oral Tab Take 1 tablet (1 mg total) by mouth 3 (three) times daily. 270 tablet 3    levothyroxine 75 MCG Oral Tab Take 1 tablet (75 mcg total) by mouth before breakfast. 90 tablet 1    lisinopril 5 MG Oral Tab Take 1 tablet (5 mg total) by mouth daily. 90 tablet 3    rosuvastatin 20 MG Oral Tab Take 1 tablet (20 mg total) by mouth nightly. 90 tablet 3    ferrous sulfate  325 (65 FE) MG Oral Tab EC Take 1 tablet (325 mg total) by mouth daily with breakfast.      TRUEplus Lancets 33G Does not apply Misc TEST BLOOD SUGAR ONE TIME DAILY IN THE MORNING 100 each 11    Alcohol Swabs 70 % Does not apply Pads Clean area prior to checking blood glucose daily. 100 each 0    Blood Glucose Monitoring Suppl (TRUE METRIX METER) w/Device Does not apply Kit       Blood Gluc Meter Disp-Strips Does not apply Device Code: E11.9. Checks qam. 50 each 5    NON FORMULARY FISH OIL. Patient doen't know how many mg. He takes it once  a day.      Glucose Blood (ONETOUCH ULTRA) In Vitro Strip Dx. E11.9. Checks qam. 100 each 11    aspirin 81 MG Oral Tab Take 1 tablet (81 mg total) by mouth daily.      Calcium Carbonate-Vitamin D 600-200 MG-UNIT Oral Tab Take by mouth.      Multiple Vitamins-Minerals (MULTIVITAMIN OR) Take by mouth.        Past Medical History:    Cataracts, bilateral    Footdrop    right    History of blood transfusion    5 years ago    Hypogammaglobulinemia (HCC)    mild, IgG1 and IgM; no treatment    Inguinal hernia, right    Repair    Positive PPD    CXR negative.     Prostate cancer (HCC)    Ureteral stricture      Past Surgical History:   Procedure Laterality Date    Appendectomy      Cataract extraction w/ intraocular lens implant Left 12/09/2020    [facility] CE LENSX/ORA MAC TORIC OS DR. SCRUGGS  [id number] +18.50    Cholecystectomy      Colonoscopy  09/2013    osis    Colonoscopy  03/2020    Colonoscopy N/A 3/4/2020    Procedure: COLONOSCOPY, POSSIBLE BIOPSY, POSSIBLE POLYPECTOMY 52540;  Surgeon: Salvador Perkins MD;  Location: [facility] [place] [facility] [place], [facility]    Egd  09/2013    stricture and large HH w Alonso erosions    Inguinal hernia repair Right 2005    Other surgical history  2001    radical prostatectomy      Family History   Problem Relation Age of Onset    Heart Attack Father         CAD    Cancer Father         lung    Cancer Sister         colon    Other (Other) Mother         alzheimers       Social History:  Social History     Socioeconomic History    Marital status:    Tobacco Use    Smoking status: Former     Current packs/day: 0.00     Types: Cigarettes     Quit date: 1998     Years since quittin.4    Smokeless tobacco: Never   Vaping Use    Vaping status: Never Used   Substance and Sexual Activity    Alcohol use: Yes     Comment: rarely    Drug use: No   Other Topics Concern    Caffeine Concern Yes     Comment: coffee, 2 cups       History/Other:     Patient Active Problem List   Diagnosis    Hyperlipidemia    Hypogammaglobulinemia (HCC)    Iron deficiency anemia    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    Essential hypertension    Family history of colon cancer    Glaucoma suspect, bilateral    Personal history of prostate cancer    Splenic infarct    Abnormal computed tomography of ureter    Hyperkalemia    Lumbar stenosis with neurogenic claudication    Type 2 diabetes mellitus without complication, without long-term current use of insulin (HCC)    Vaccine counseling    Positive PPD    Dry eye syndrome of bilateral lacrimal glands    CKD (chronic kidney disease) stage 3, GFR 30-59 ml/min (HCC)    Chronic pain of right knee    Hypercalcemia    Hypomagnesemia    Abnormal TSH    Abnormal CT of the abdomen    Pulmonary nodule    Elevated PTHrP level    Combined forms of age-related cataract of right eye    Smokers' cough (HCC)    Vitamin D deficiency     Tobacco:  He smoked tobacco in the past but quit greater than 12 months ago.  Social History     Tobacco Use   Smoking Status Former    Current packs/day: 0.00    Types: Cigarettes    Quit date: 1998    Years since quittin.4   Smokeless Tobacco Never        CAGE Alcohol Screen:   CAGE screening score of 0 on 2025, showing low risk of alcohol abuse.        Patient Care Team:  Janiya Griggs MD as PCP - General (Internal Medicine)  Salvador Perkins MD (GASTROENTEROLOGY)  Laura Douglas MD as Consulting Physician  (OPHTHALMOLOGY)  Nathan Amor MD as Consulting Physician (SURGERY, ORTHOPEDIC)  Chey Stone MD as Consulting Physician (Hematology and Oncology)  Gerry Carmona MD as Consulting Physician (DERMATOLOGY)  HPI    Review of Systems   Constitutional: Negative.  Negative for activity change, appetite change, chills, diaphoresis, fatigue, fever and unexpected weight change.   HENT: Negative.  Negative for congestion, dental problem, drooling, ear discharge, ear pain, facial swelling, hearing loss, mouth sores, nosebleeds, postnasal drip, rhinorrhea, sinus pressure, sinus pain, sneezing, sore throat, tinnitus, trouble swallowing and voice change.    Eyes: Negative.  Negative for photophobia, pain, discharge, redness, itching and visual disturbance.   Respiratory: Negative.  Negative for apnea, cough, choking, chest tightness, shortness of breath, wheezing and stridor.    Cardiovascular: Negative.  Negative for chest pain, palpitations and leg swelling.   Gastrointestinal: Negative.  Negative for abdominal distention, abdominal pain, anal bleeding, blood in stool, constipation, diarrhea, nausea, rectal pain and vomiting.   Endocrine: Negative for cold intolerance, heat intolerance, polydipsia, polyphagia and polyuria.   Genitourinary: Negative.  Negative for decreased urine volume, difficulty urinating, dysuria, flank pain, frequency, genital sores, hematuria, penile discharge, penile pain, penile swelling, scrotal swelling, testicular pain and urgency.   Skin: Negative.  Negative for color change, pallor, rash and wound.   Neurological: Negative.  Negative for dizziness, tremors, seizures, syncope, facial asymmetry, speech difficulty, weakness, light-headedness, numbness and headaches.   Psychiatric/Behavioral: Negative.  Negative for agitation, behavioral problems, confusion, decreased concentration, dysphoric mood, hallucinations, self-injury, sleep disturbance and suicidal ideas. The patient is not nervous/anxious  and is not hyperactive.    All other systems reviewed and are negative.          Functional Ability/Status:   Israel Cornelius has a completely normal functional assessment. See flowsheet for details.        Fall Risk Assessment:   He has been screened for Falls and is low risk.        Medicare Hearing Assessment:   Hearing Screening    Time taken: 6/19/2025 10:12 AM  Entry User: Davida Garza  Screening Method: Finger Rub  Finger Rub Result: Pass         Depression Screening (PHQ-2/PHQ-9): PHQ-2 SCORE: 0  , done 6/12/2025          Cognitive Assessment:   He had a completely normal cognitive assessment - see flowsheet entries       Supplementary Documentation:     In the past six months, have you lost more than 10 pounds without trying?: (Patient-Rptd) 2 - No  Has your appetite been poor?: (Patient-Rptd) No  Type of Diet: (Patient-Rptd) Balanced  How does the patient maintain a good energy level?: (Patient-Rptd) Daily Walks  How would you describe your daily physical activity?: (Patient-Rptd) Moderate  How would you describe your current health state?: (Patient-Rptd) Good  How do you maintain positive mental well-being?: (Patient-Rptd) Social Interaction  On a scale of 0 to 10, with 0 being no pain and 10 being severe pain, what is your pain level?: (Patient-Rptd) 3 - (Mild)  In the past six months, have you experienced urine leakage?: (Patient-Rptd) 0-No  At any time do you feel concerned for the safety/well-being of yourself and/or your children, in your home or elsewhere?: (Patient-Rptd) No  Have you had any immunizations at another office such as Influenza, Hepatitis B, Tetanus, or Pneumococcal?: (Patient-Rptd) No    Visual Acuity:   Right Eye Visual Acuity: Uncorrected Right Eye Chart Acuity: 20/40   Left Eye Visual Acuity: Uncorrected Left Eye Chart Acuity: 20/40   Both Eyes Visual Acuity: Uncorrected Both Eyes Chart Acuity: 20/40   Able To Tolerate Visual Acuity: Yes        PHYSICAL EXAM:   BP  127/78 (BP Location: Right arm, Patient Position: Sitting, Cuff Size: large)   Pulse 78   Temp 97.2 °F (36.2 °C) (Temporal)   Ht 5' 8\" (1.727 m)   Wt 183 lb 6.4 oz (83.2 kg)   SpO2 100%   BMI 27.89 kg/m²   BP Readings from Last 3 Encounters:   06/19/25 127/78   04/13/25 150/77   03/13/25 137/72     Wt Readings from Last 3 Encounters:   06/19/25 183 lb 6.4 oz (83.2 kg)   03/13/25 180 lb 6.4 oz (81.8 kg)   12/12/24 181 lb 3.2 oz (82.2 kg)      Body mass index is 27.89 kg/m².   Physical Exam  Vitals and nursing note reviewed.   Constitutional:       General: He is not in acute distress.     Appearance: Normal appearance. He is well-developed and well-groomed. He is not ill-appearing, toxic-appearing or diaphoretic.      Interventions: He is not intubated.  HENT:      Head: Normocephalic and atraumatic.      Right Ear: Hearing, tympanic membrane, ear canal and external ear normal. No decreased hearing noted. No laceration, drainage, swelling or tenderness. No middle ear effusion. There is no impacted cerumen. No foreign body. No mastoid tenderness. No PE tube. No hemotympanum. Tympanic membrane is not injected, scarred, perforated, erythematous, retracted or bulging. Tympanic membrane has normal mobility.      Left Ear: Hearing, tympanic membrane, ear canal and external ear normal. No decreased hearing noted. No laceration, drainage, swelling or tenderness.  No middle ear effusion. There is no impacted cerumen. No foreign body. No mastoid tenderness. No PE tube. No hemotympanum. Tympanic membrane is not injected, scarred, perforated, erythematous, retracted or bulging. Tympanic membrane has normal mobility.      Nose:      Right Sinus: No maxillary sinus tenderness or frontal sinus tenderness.      Left Sinus: No maxillary sinus tenderness or frontal sinus tenderness.      Mouth/Throat:      Lips: Pink. No lesions.      Mouth: Mucous membranes are moist. No injury, lacerations, oral lesions or angioedema.       Dentition: No dental tenderness, gingival swelling, dental abscesses or gum lesions.      Tongue: No lesions. Tongue does not deviate from midline.      Palate: No mass and lesions.      Pharynx: No pharyngeal swelling, oropharyngeal exudate, posterior oropharyngeal erythema or uvula swelling.      Tonsils: No tonsillar exudate or tonsillar abscesses.   Eyes:      General: Lids are normal. Gaze aligned appropriately. No scleral icterus.        Right eye: No foreign body, discharge or hordeolum.         Left eye: No foreign body, discharge or hordeolum.      Extraocular Movements: Extraocular movements intact.      Right eye: Normal extraocular motion and no nystagmus.      Left eye: Normal extraocular motion and no nystagmus.      Conjunctiva/sclera: Conjunctivae normal.      Right eye: Right conjunctiva is not injected. No chemosis, exudate or hemorrhage.     Left eye: Left conjunctiva is not injected. No chemosis, exudate or hemorrhage.     Pupils: Pupils are equal, round, and reactive to light.   Neck:      Thyroid: No thyroid mass, thyromegaly or thyroid tenderness.      Vascular: No carotid bruit or JVD.      Trachea: Trachea and phonation normal. No tracheal tenderness, tracheostomy, abnormal tracheal secretions or tracheal deviation.   Cardiovascular:      Rate and Rhythm: Normal rate and regular rhythm.      Pulses: Normal pulses.           Carotid pulses are 2+ on the right side and 2+ on the left side.       Radial pulses are 2+ on the right side and 2+ on the left side.        Dorsalis pedis pulses are 2+ on the right side and 2+ on the left side.        Posterior tibial pulses are 2+ on the right side and 2+ on the left side.      Heart sounds: Normal heart sounds, S1 normal and S2 normal.   Pulmonary:      Effort: Pulmonary effort is normal. No tachypnea, bradypnea, accessory muscle usage, prolonged expiration, respiratory distress or retractions. He is not intubated.      Breath sounds: Normal breath  sounds and air entry. No stridor, decreased air movement or transmitted upper airway sounds. No decreased breath sounds, wheezing, rhonchi or rales.   Chest:      Chest wall: No tenderness.   Abdominal:      General: Bowel sounds are normal. There is no distension.      Palpations: Abdomen is soft. Abdomen is not rigid.      Tenderness: There is no abdominal tenderness. There is no right CVA tenderness, left CVA tenderness, guarding or rebound.   Musculoskeletal:      Cervical back: Neck supple.      Right lower leg: No edema.      Left lower leg: No edema.   Lymphadenopathy:      Head:      Right side of head: No submental, submandibular, preauricular, posterior auricular or occipital adenopathy.      Left side of head: No submental, submandibular, preauricular, posterior auricular or occipital adenopathy.      Cervical: No cervical adenopathy.      Right cervical: No superficial, deep or posterior cervical adenopathy.     Left cervical: No superficial, deep or posterior cervical adenopathy.      Upper Body:      Right upper body: No supraclavicular adenopathy.      Left upper body: No supraclavicular adenopathy.   Skin:     General: Skin is warm and dry.      Coloration: Skin is not pale.      Findings: No erythema or rash.      Nails: There is no clubbing.   Neurological:      Mental Status: He is alert and oriented to person, place, and time.   Psychiatric:         Speech: Speech normal.         Behavior: Behavior normal. Behavior is cooperative.     Bilateral barefoot skin diabetic exam is normal, visualized feet and the appearance is normal.  Bilateral monofilament sensation of both feet is normal.  Pulsation pedal pulse exam of both lower legs/feet is normal as well.            ASSESSMENT/PLAN:     Encounter Diagnoses   Name Primary?    Abnormal TSH Resolved.    Yes    Mixed hyperlipidemia Stable.        Stage 3a chronic kidney disease (HCC) No motrin, ibuprofen, advil, alleve, naprosyn  with these  medications.         Vitamin D deficiency Check blood.        Vaccine counseling Recommend RSV vaccine.  Would check on insurance coverage at local pharmacy.  RSV is a one-time vaccine as of now.  Potential side effects with RSV vaccine are low-grade fevers body aches etc.  Also would recommend flu shot.  Separate from RSV vaccine by 2 weeks.  Also would recommend new COVID-vaccine.  Separate from other 2 vaccines by 2 weeks.        Type 2 diabetes mellitus without complication, without long-term current use of insulin (HCC) Stable. Check blood and urine. Careful with diet and excercise at least 30 minutes 3-4 times a week. Check sugars at different times on different dates. Careful with low sugars. Carry something with you and check sugar if can. Can carry ye cracker, etc. Decrease carbohydrates. But also, careful with fruits and natural sugars.One serving a day and no more than 1 handful every day. Check feet  every AM and careful with sores and ulcers on feet bilaterally. Check eyes every year with dilated eye exam.  Check sugars.  2-hour postmeal should be less than 140s.  Pre-meal should be 's.  Both equally affected A1c.  Discussed importance of glycemic control to prevent complications of diabetes  -Discussed complications of diabetes include retinopathy, neuropathy, nephropathy and cardiovascular disease  -Discussed ABCs of DM  -Discussed importance of SBGM  -Discussed importance of low CHO diet, recommend 45gm per meal or 135gm per day  -Normal foot exam.Wear shoes or sandals in the house at all times.  Good comfortable shoes or sandals that have a strap so stay on the feet.  Check feet every morning watch for signs and symptoms of infection i.e. sores drainage redness pain etc.  If lotion feet, lotion on the top and bottom and not between the toes.   -FU with optho  -Normotensive   Check urine.       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        Pulmonary nodule Stable.       Positive PPD status posttreatment.        Personal history of prostate cancer       Lumbar stenosis with neurogenic claudication Improved.        Other iron deficiency anemia Check blood.        Hypomagnesemia Resolved.        Hypogammaglobulinemia (HCC)       Hyperkalemia Resolved.        Hypercalcemia Resolved.        Glaucoma suspect, bilateral no new vision changes.  Follow-up with ophthalmology.       Family history of colon cancer up-to-date with screening.       Elevated PTHrP level       Dry eye syndrome of bilateral lacrimal glands no symptoms.  Follow-up with ophthalmology.       Combined forms of age-related cataract of right eye follow-up with ophthalmology.  No new vision changes.       Chronic pain of right knee stable.       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 avoidance.       Abnormal CT of the abdomen     Abnormal computed tomography of ureter Stable.        Encounter for annual health examination check urine.        Orders Placed This Encounter   Procedures    URINALYSIS, AUTO, W/O SCOPE    Vitamin D       Meds This Visit:  Requested Prescriptions      No prescriptions requested or ordered in this encounter       Imaging & Referrals:  None      RTC 6 months for FU.     1. Abnormal TSH (Primary)  2. Mixed hyperlipidemia  3. Stage 3a chronic kidney disease (HCC)  -     URINALYSIS, AUTO, W/O SCOPE  4. Vitamin D deficiency  -     Vitamin D; Future; Expected date: 06/19/2025  5. Vaccine counseling  6. Type 2 diabetes mellitus without complication, without long-term current use of insulin (HCC)  7. Splenic infarct  8. Pulmonary nodule  Overview:  Incidental lung nodule, > 3mm and < 8mm0.6 cm subpleural nodule within the left lower lobe is unchanged since the prior exams.   --Stable since 2018 and therefore do not recommend biopsy or further w/u as likely benign given stability over the past 4 yrs.      9. Positive PPD  Overview:  CXR negative.   10. Personal history of prostate cancer  Overview:  Radical prostatectomy 2001  11. Lumbar stenosis with  neurogenic claudication  12. Other iron deficiency anemia  Overview:  Other iron deficiency anemia  --Completed GI w/u that was negative.  --iron deficiency has resolved.    --No further need for iron, folic acid or vitamin C supplement.  . He also is taking iron pills due to iron deficiency in 2020 and colonoscopy negative.  Last EGD in 2013 with a hiatal hernia, stricture and Alonso erosions.  13. Hypomagnesemia  14. Hypogammaglobulinemia (HCC)  15. Hyperkalemia  16. Hypercalcemia  Overview:  mild hypercalcemia with mildly elevated peptide related peptide.  His highest calcium level has been up to 10.9 which was back on 1130 of 2021.  This was initially noted on 11/24/2020.  Prior to that his calcium levels were normal, and in fact he had hypocalcemia back in 2018.  His albumin has remained normal, he does have chronic kidney disease stage III.  He has had CBCs which have been normal.  Patient's work-up included serum protein electrophoresis which did not show any M spike, I did show decreased immunoglobulins.  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  He did have a light chains which showed slightly elevated kappa light chain, with normal ratio, but in the setting of CKD.  Patient follows with endocrinology.  He was requested to meet with medical oncology due to the mild hypercalcemia.  Patient had Bence-Lester protein in the urine evaluated back in 2014 which was negative.Patient with initial hypocalcemia, on supplemental calcium with subsequent hypercalcemia. Patient has discontinued the calcium supplement since December and has not had repeat levels since.  Recommend to repeat as per order from endocrine and PCP.   --Elevated PTH rp but on imaging, there is no evidence of malignancy or bone lesions.  Alk phos is normal.  SPEP is also negative.   --He does have mild CKD and maybe contributing to PTH rp elevation.  This has contributed to elevation of kappa light chain.    1/24/2022 as part  of the work-up to look for occult malignancy, the patient underwent CT scan of the chest, abdomen and pelvis which revealed no evidence of a primary cancer nor metastatic disease in the chest, abdomen, or pelvis.  The postoperative changes secondary to prostatectomy.  He also had a 2.7 cm wedge-shaped area of low attenuation in the medial spleen which was felt to be new since prior exam and may represent small splenic infarct.  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  Patient had hypercoagulable work-up at that time for acquired thrombophilia, given his age at the time it was not felt that a work-up for hereditary thrombophilia be performed, and he did not have family history either.  Anticardiolipin antibodies were performed at the time which were negative.  17. Glaucoma suspect, bilateral  18. Family history of colon cancer  Overview:  Sister-63  19. Elevated PTHrP level  Overview:  Patient here for evaluation of mild hypercalcemia with mildly elevated peptide related peptide.  His highest calcium level has been up to 10.9 which was back on 1130 of 2021.  This was initially noted on 11/24/2020.  Prior to that his calcium levels were normal, and in fact he had hypocalcemia back in 2018.  His albumin has remained normal, he does have chronic kidney disease stage III.  He has had CBCs which have been normal.  Patient's work-up included serum protein electrophoresis which did not show any M spike, I did show decreased immunoglobulins.  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  He did have a light chains which showed slightly elevated kappa light chain, with normal ratio, but in the setting of CKD.  Patient follows with endocrinology.  He was requested to meet with medical oncology due to the mild hypercalcemia.  Patient had Bence-Lester protein in the urine evaluated back in 2014 which was negative.     20. Dry eye syndrome of bilateral lacrimal  glands  21. Combined forms of age-related cataract of right eye  22. Chronic pain of right knee  Overview:  5-18-21: Assessment: Right knee pain with likely lateral meniscus tear.     Plan: Recommended cortisone injection today which she accepted.  Aspiration was negative and he tolerated the injection bupivacaine 0.5% 4 cc and Kenalog 1 cc well.  He will call in a week.  If he is not improved, he will have MRI right knee and follow in the office.  23. 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 Abnormal CT of the abdomen  Overview:     1/24/2022 as part of the work-up to look for occult malignancy, the patient underwent CT scan of the chest, abdomen and pelvis which revealed no evidence of a primary cancer nor metastatic disease in the chest, abdomen, or pelvis.  The postoperative changes secondary to prostatectomy.  He also had a 2.7 cm wedge-shaped area of low attenuation in the medial spleen which was felt to be new since prior exam and may represent small splenic infarct.  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  Patient had hypercoagulable work-up at that time for acquired thrombophilia, given his age at the time it was not felt that a work-up for hereditary thrombophilia be performed, and he did not have family history either.  Anticardiolipin antibodies were performed at the time which were negative. Recent CT scan with a 2.7 x 2.3 cm wedge-shaped low-attenuation lesions seen within the medial aspect of the spleen which is new since the prior exams and a 0.6 cm subpleural nodule within the left lower lobe is unchanged since the prior exams, going back to 2018. Abnormal CT of the abdomen  --Prior h/o wedge shaped areas in the spleen, felt to be c/w infarcts.  These had entirely resolved and now has a new one. Unclear as how long this has been present.  --Asymptomatic.  --No recommendation for anticoagulation  25. Abnormal computed tomography of ureter  26. Encounter for annual  health examination      The patient indicates understanding of these issues and agrees to the plan.  Further testing ordered.  Imaging studies ordered.  Lab work ordered.  Reinforced healthy diet, lifestyle, and exercise.      No follow-ups on file.    Advanced Directives:   He does have a Living Will but we do NOT have it on file in Epic.    He does have a POA but we do NOT have it on file in Epic.    Discussed Advance Care Planning with patient (and family/surrogate if present). Standard forms made available to patient in After Visit Summary.  16+ minutes was spent with all Advanced Care Planning elements today (up to 30 minutes for CPT 57830)    MD Israel Hugo's SCREENING SCHEDULE   Tests on this list are recommended by your physician but may not be covered, or covered at this frequency, by your insurer.   Please check with your insurance carrier before scheduling to verify coverage.   PREVENTATIVE SERVICES FREQUENCY &  COVERAGE DETAILS LAST COMPLETION DATE   Diabetes Screening    Fasting Blood Sugar / Glucose    One screening every 12 months if never tested or if previously tested but not diagnosed with pre-diabetes   One screening every 6 months if diagnosed with pre-diabetes Lab Results   Component Value Date     (H) 03/31/2025        Cardiovascular Disease Screening    Lipid Panel  Cholesterol  Lipoprotein (HDL)  Triglycerides Covered every 5 years for all Medicare beneficiaries without apparent signs or symptoms of cardiovascular disease Lab Results   Component Value Date    CHOLEST 121 10/21/2024    HDL 39 (L) 10/21/2024    LDL 55 10/21/2024    LDLD 57 06/24/2022    TRIG 157 (H) 10/21/2024         Electrocardiogram (EKG)   Covered if needed at Welcome to Medicare, and non-screening if indicated for medical reasons 11/30/2020      Ultrasound Screening for Abdominal Aortic Aneurysm (AAA) Covered once in a lifetime for one of the following risk factors    Men who are  65-75 years old and have ever smoked    Anyone with a family history -     Colorectal Cancer Screening  Covered for ages 50-85; only need ONE of the following:    Colonoscopy   Covered every 10 years    Covered every 2 years if patient is at high risk or previous colonoscopy was abnormal 03/04/2020    No recommendations at this time    Flexible Sigmoidoscopy   Covered every 4 years -    Fecal Occult Blood Test Covered annually -   Prostate Cancer Screening    Prostate-Specific Antigen (PSA) Annually Lab Results   Component Value Date    PSA <0.01 03/04/2022     There are no preventive care reminders to display for this patient.   Immunizations    Influenza Covered once per flu season  Please get every year 12/12/2024  No recommendations at this time    Pneumococcal Each vaccine ([id number] & [id number]) covered once after 65 Prevnar 13: 11/24/2015    Irdoxqmsg40: 11/24/2020     No recommendations at this time    Hepatitis B One screening covered for patients with certain risk factors   -  No recommendations at this time    Tetanus Toxoid Not covered by Medicare Part B unless medically necessary (cut with metal); may be covered with your pharmacy prescription benefits -    Tetanus, Diptheria and Pertusis TD and TDaP Not covered by Medicare Part B -  No recommendations at this time    Zoster Not covered by Medicare Part B; may be covered with your pharmacy  prescription benefits -  No recommendations at this time     Diabetes      Hemoglobin A1C Annually; if last result is elevated, may be asked to retest more frequently.    Medicare covers every 3 months Lab Results   Component Value Date     (H) 02/12/2025    A1C 7.6 (H) 02/12/2025       No recommendations at this time    Creat/alb ratio Annually Lab Results   Component Value Date    MICROALBCREA  12/12/2024      Comment:      Unable to calculate due to Urine Microalbumin <0.3 mg/dL           LDL Annually Lab Results   Component Value Date    LDL 55  10/21/2024       Dilated Eye Exam Annually Last Diabetic Eye Exam:  Last Dilated Eye Exam: 10/03/24  Eye Exam shows Diabetic Retinopathy?: No       Annual Monitoring of Persistent Medications (ACE/ARB, digoxin diuretics, anticonvulsants)    Potassium Annually Lab Results   Component Value Date    K 5.0 03/31/2025         Creatinine   Annually Lab Results   Component Value Date    CREATSERUM 1.59 (H) 03/31/2025         BUN Annually Lab Results   Component Value Date    BUN 22 03/31/2025       Drug Serum Conc Annually No results found for: \"DIGOXIN\", \"DIG\", \"VALP\"           Chronic Obstructive Pulmonary Disease (COPD)    Spirometry Annually Spirometry date:             Understanding Advance Care Planning  Advance care planning is the process of deciding one’s own future medical care. It helps assure that if you can’t speak for yourself, your wishes can still be carried out. The plan is a series of legal documents that note a person’s wishes. The documents vary by state. Advance care planning should be discussed at a regular office visit with your primary care provider before an acute illness. Advance care planning is encouraged when a person has a serious illness that is expected to get worse. It may also be done before major surgery. And it can help you and your family be prepared in case of a major illness or injury. Advance care planning helps with making decisions at these times.     Who will speak on your behalf?  A healthcare proxy is a person who acts as the voice of a patient when the patient can’t speak for himself or herself. The name of this role varies by state. It may be called a Durable Medical Power of  or Durable Power of  for Healthcare. It may be called an agent, surrogate, or advocate. Or it may be called a representative or decision maker. It's an official duty that is identified by a legal document. The document also varies by state.   Why is advance care planning important?   If a  person communicates his or her healthcare wishes:   He or she will be given medical care that matches his or her values and goals.  Family members will not be forced to make decisions in a crisis with no guidance.  Creating a plan  Making an advance care plan is often done in 3 steps:   Thinking about one’s wishes. To create an advance care plan, think about what kind of medical treatment you would want if you lose the ability to communicate. Are there any situations in which you would refuse or stop treatment? Are there therapies you would want or not want? And whom do you want to make decisions for you? There are many places to learn more about how to plan for your care. Ask your healthcare provider or  for resources.  Picking a healthcare proxy. This means choosing a trusted person to speak for you only when you can’t speak for yourself. When you can't make medical decisions, your proxy makes sure the instructions in your advance care plan are followed. A proxy doesn't make decisions based on his or her own opinions. They must put aside those opinions and values if needed, and carry out your wishes.  Filling out the legal documents. There are several kinds of legal documents for advance care planning. Each one tells healthcare providers your wishes. The documents may vary by state. They must be signed and may need to be witnessed or notarized. You can cancel or change them whenever you wish. Depending on your state, the documents may include a Healthcare Proxy form, Living Will, Durable Medical Power of , and Advance Directive.  The family’s role  The best help a family can give is to support their loved one’s wishes. Open and honest communication is vital. Family should express any concerns they have about the patient’s choices while the patient can still make decisions in the event that his or her illness prevents communicating those wishes at a later time.    Roma last reviewed this  educational content on 12/1/2019 © 2000-2021 [employer]. All rights reserved. This information is not intended as a substitute for professional medical care. Always follow your healthcare professional's instructions.          Advance Medical Directive  An advance medical directive is a form that lets you plan ahead for the care you’d want if you could no longer express your wishes. This statement outlines the medical treatment you’d want or names the person you’d wish to make healthcare decisions for you. Be aware that laws vary from state to state, and it may be worthwhile to talk with an .     Writing down your wishes  An advance directive is important whether you’re young or old. Injury or illness can strike at any age.  Decide what is important to you and the kind of treatment you’d want, or not want to have.  Some states allow only one kind of advance directive. Some let you do both a durable power of  for healthcare and a living will. Some states put both kinds on the same form.    A durable power of  (POA) for healthcare   This form lets you name someone else that you have chosen and trust to speak and make decisions on your behalf.  This person can decide on treatment for you only when you can’t speak for yourself.  You don't need to be at the end of your life. They could speak for you if you were in a coma but were likely to recover.    A living will  This form lets you list the care you want at the end of your life.  A living will applies only if you won’t live without medical treatment. It would apply if you had advanced cancer, a massive stroke, or other serious illness from which you will not recover.  It takes effect only when you can no longer express your wishes yourself.    Cleo last reviewed this educational content on 2/1/2021    © 7446-5451 [employer]. All rights reserved. This information is not intended as a substitute for  professional medical care. Always follow your healthcare professional's instructions.          Choosing an Agent  A durable power of  for healthcare is only as good as the person you name to be your agent. Your agent is the person you have chosen to speak and make decisions on your behalf. If this person knows your treatment wishes and is willing to carry them out, you’ll probably be well represented. Be sure to tell your agent what’s important to you.     Who to choose  Here are suggestions for choosing an agent:  You can name a family member, close friend, , , or rabbi.  You should name one person as your agent. Then name one or two alternates. You need a backup person in case your first choice can’t be reached when needed.  Talk with each person you're thinking of naming as your agent or alternate. Do this before you decide who should carry out your wishes.  Your agent should be ...  A competent adult, age 18 or older  Someone you trust and can talk to about the care you want and what's important to you  Someone who supports your treatment choices    In many states, your agent can’t be ...   Your healthcare provider  An employee of your provider or of a hospital, nursing home, or hospice program where you receive care  Some states have other restrictions on who can be named as an agent for an advance directive.   Be prepared  Tip: It's a good idea to write down your wishes and give a copy to your agent and all others who are involved with your healthcare.   Roma last reviewed this educational content on 8/1/2021 © 2000-2021 [employer]. All rights reserved. This information is not intended as a substitute for professional medical care. Always follow your healthcare professional's instructions.          Understanding DNR Orders  Do not resuscitate (DNR) orders tell hospital staff not to do potentially life-restoring measures, such as CPR, if you or your loved one's heart  and lungs stop working. It allows a natural death, and prevents healthcare staff from artificially reviving a person and placing them on life support. In many states, a DNR order also applies to staff outside the hospital such as in nursing homes and emergency medical services. A DNR order must be written by a healthcare provider. Or in some cases, certain other healthcare workers write it. This can only be done with the person’s or family’s consent. If a person has not written an advance directive, their family will decide on a DNR with the help of the healthcare team.   The person can cancel a DNR order at any time. The healthcare team can answer questions about the DNR form. Have copies of a DNR form are readily available so that your wishes can be faithfully followed.   Writing a DNR order  When might a DNR order be written? When the person’s health condition is such that, in the case of cardiac arrest, CPR and other resuscitation methods are not desired. This could be because the chance of successful resuscitation is very low. Or it could be because the care plan now focuses on comfort measures instead of life-sustaining measures. Coma and terminal illness are instances when a DNR order might be used.   Irreversible coma  In a coma, a person does not respond to sight, sound, or touch. The heart and lungs could be working, but brain function is damaged due to trauma or disease.   Terminal illness  In the last stages of heart disease, AIDS, cancer, and other illnesses, some people don’t want to prolong their suffering. If recovery isn’t likely and quality of life is poor or getting worse, a person or their family may agree to a DNR order.   DNR orders and hospice care  A hospice program can offer care during the final weeks of life. Hospice programs provide dignity, pain control and comfort care in the home or at special facilities. Hospice does not provide aggressive treatment. In fact, a DNR order will likely  be discussed before a person is admitted to hospice. A  or  may be able to help you arrange for hospice support.   Documenting end-of-life wishes  In addition to DNR orders, a person with a serious, life-limiting illness may wish to document their treatment wishes. This is called a POST form or by different names, depending on the state. It's meant to provide a portable medical order to help guide healthcare providers on the specific medical treatments a person wants during a medical emergency. It's meant to complement a DNR order, not replace it. Your healthcare provider can tell you more and help you complete the forms. The form may be called one of these:   MOLST (medical orders for life-sustaining treatment)  POLST (physician orders for life-sustaining treatment)  MOST (medical orders for scope of treatment)  POST (physician orders for scope of treatment)  TPOPP (transportable physician orders for patient preferences)  Roma last reviewed this educational content on 7/1/2021 © 2000-2021 [employer]. All rights reserved. This information is not intended as a substitute for professional medical care. Always follow your healthcare professional's instructions.          An Agent’s Role for Durable Power of  for Health Care   It’s impossible to know which medical treatment choices you might face in the future. What if you aren't able to make these decisions for yourself? A durable power of  for healthcare lets you name an agent to speak and carry out your wishes on your behalf. This happens only if you can’t express your wishes yourself.     An agent’s duty  Your agent respects your wishes in the following ways:    Your agent’s duty is to see that your wishes are followed.  If your wishes aren’t known, your agent should try to decide what you want.  Your agent’s choices come before anyone else’s wishes for you.  A durable power of  for healthcare  doesn't not give your agent control over your money . Your agent also can’t be made to pay your bills.  Find out what your agent can do  Restrictions on what an agent can and can’t do vary by state. Check your state laws. In most states your agent can:   Choose or refuse life-sustaining and other medical treatment on your behalf  Consent to treatment, and then stop treatment if your condition doesn’t improve  Access and release your medical records  Request an autopsy and donate your organs, unless you’ve stated otherwise in your advance directive  Find out whether your state allows your agent to do the following:   Refuse or withdraw life-enhancing care  Refuse or stop tube feeding or other life-sustaining care--even if you haven’t stated on your advance directive that you don’t want these treatments  Order sterilization or   Roma last reviewed this educational content on 2021 [employer]. All rights reserved. This information is not intended as a substitute for professional medical care. Always follow your healthcare professional's instructions.          Being a Healthcare Proxy  A healthcare proxy is someone who represents a person who can’t speak for themself. The name of this role varies by state. It may be called a Durable Medical Power of . It may be called a Durable Power of  for Healthcare. It may be called an agent, surrogate, or advocate. Or it may be called a representative or decision maker. It's an official duty that is noted by a legal document. The document also varies by state. The person must name you as his or her proxy on the document.      A healthcare proxy speaks for another person when he or she is not able to do so. The proxy helps make sure the person’s healthcare wishes are known and followed.     What it means to be a healthcare proxy   Your role as healthcare proxy starts when the person can’t make medical decisions. This  assessment can only be made by a licensed doctor. You then make the healthcare decisions as needed. You do this by carrying out the person’s wishes. These wishes are noted in his or her advance care planning documents. These declare what kind of treatment the person wishes to have or not have. You may need to put aside your own values and opinions to carry out the person’s wishes. This may include refusing or stopping life-sustaining treatments.   Documenting end-of-life wishes   As a healthcare proxy, encourage the person to discuss his or her wishes, while they are able. They can do this with their healthcare provider and then document the wishes as a medical order. The provider can help the person complete the form. The forms are known by different names depending on the state. The form may be called one of these:     MOLST (medical orders for life-sustaining treatment)  POLST (physician orders for life-sustaining treatment)  MOST (medical orders for scope of treatment)  POST (physician orders for scope of treatment)  TPOPP (transportable physician orders for patient preferences)    The form documents the person’s wishes at the end of life. It's not tied to a certain healthcare provider or facility. It's different than a living will. The form is an order written according to state regulations by a healthcare provider. To complete one, the person must express his or her wishes to an advanced healthcare provider. If the person can’t make his or her own decisions, then this is done by the person’s healthcare proxy.   Carrying out your role  Your duties depend on what the person’s advance care planning documents say. Your duties may also depend on state law. In general:   Before accepting a role as a proxy, talk with the person. Be sure you know his or her wishes. Ask questions. This will help you be his or her voice if and when it is needed.  Be sure that the person’s healthcare team knows that you are his or her  proxy. Carry a copy of the document and proof of your identity.  Make sure the healthcare team has a copy of the advance care planning documents.  Talk to the healthcare team. Ask questions as often as you need. Stay informed about the person’s condition.  Ask for any help you need to understand the medical situation. Ask about the person’s condition and prognosis. Ask about risks and benefits of tests and treatments. Find out all the facts and options.  Speak on the person’s behalf with the healthcare team when needed.  Talk with family members and keep them informed.  Know your rights. You have the right to ask for information. You can ask for consultations and second opinions. You have the right to request or refuse treatment for the person. You may be able to review his or her medical chart. You can authorize the person’s transfer to another facility. You can also request a new healthcare provider for him or her. If you are not sure what your rights are at any time, ask a .  When it’s time to make decisions  If the person’s wishes are clear in the advance care plan documents, ask for them to be carried out as noted. If they are not clear, talk with the healthcare team. Listen to the team’s recommendations. Talk with a  or counselor. It may be hard for you to make a decision at times. You may feel sad or upset about a decision. Being a healthcare proxy is not an easy role. But it's an important one. Remember that the person trusts you to carry out his or her wishes.   If you need help  Ask the healthcare team if you have trouble with a decision. The healthcare team will help you.  Encourage the person you are helping to have a conversation with their provider about their end-of-life wishes. The provider can help them fill out the form.  You may need help in resolving family conflicts. Ask the hospital or clinic , ethics consultant, or a  for help.  If  you are having trouble talking with the healthcare team while the person is in the hospital, reach out to the patient relations department. Or ask to speak to the hospital ombudsman or ethics committee.    [place] last reviewed this educational content on 9/1/2019 © 2000-2021 [employer]. All rights reserved. This information is not intended as a substitute for professional medical care. Always follow your healthcare professional's instructions.          Life Support  If you understand how specific treatments may affect your quality of life, you can decide which ones you’d choose or refuse. You may want to talk to your healthcare provider about the possible benefits and risks of treatments. The chance of good results from these therapies varies based on each individual clinical situation and can be very hard to predict. Medical treatment, if your life is in danger, falls into 3 main categories.     Life supporting  This care keeps your heart and lungs going when they can no longer work on their own.  CPR restarts your heart and lungs if they stop working.  A respirator (or ventilator) keeps you breathing. Air is pumped into your lungs through a tube that’s put into your windpipe.    Life sustaining  This care keeps you alive longer when you have an illness that can’t be cured.  Tube feeding or TPN (total parenteral nutrition) provides food and fluids through a tube or IV (intravenous). It is given if you can’t chew or swallow on your own.  Dialysis is a kidney machine that cleans your blood when your kidneys can no longer work on their own.    Life enhancing  This care controls pain and discomfort, such as nausea or trouble breathing. This type of care is not designed to prolong your life, but to enhance comfort and quality of life. Nothing is done to keep you alive longer.  Hospice care is comfort care. It might provide food and fluids by mouth or help with bathing. Hospice care is given during  the last stages of a terminal illness.  Strong pain medicine can be given to help keep you comfortable.    Do Not Resuscitate (DNR)  Would you want CPR if your heart stops while you’re a patient in a hospital or nursing home? If not, talk to your healthcare provider about issuing a DNR (Do-Not-Resuscitate) order.   DNRs and advance directives may not apply during anesthesia, in emergency rooms, or when emergency medical teams respond to a 911 call. Ask your healthcare provider how you can make sure your wishes will be followed. Also, a DNR will not prevent you from getting other kinds of needed medical care such as treatment for pain, or bleeding.   [place] last reviewed this educational content on 8/1/2021 © 2000-2021 [employer]. All rights reserved. This information is not intended as a substitute for professional medical care. Always follow your healthcare professional's instructions.          Stopping Life-Sustaining Treatments  Certain treatments can help sustain life when you have a serious illness. But as your illness progresses, there may come a time when these treatments are no longer a benefit. Decisions must then be made whether to continue or stop these treatments. This can be a hard task for you and your loved ones, but know that you’re not alone. Your healthcare provider and healthcare team will guide you through these treatment decisions. They will also help answer any questions you may have.     What are life-sustaining treatments?  Life-sustaining treatments help keep you alive if a vital body function fails. These treatments can include CPR and the use of machines to help with heart, lung, or kidney function. They can also include the use of tubes to deliver food, fluids, blood, and medicines to the body. Blood transfusions and antibiotics are also types of life-sustaining treatments.   What happens if life-sustaining treatments are continued?  These treatments can help extend  your life. But they will not cure your illness. If you are near the end of your life, you may find it hard to handle the side effects and problems that can occur with these treatments. In this case, the treatments may be too much of a burden on your body. They may cause more harm than good. They may also disrupt the natural dying process and prolong suffering.   What happens if life-sustaining treatments are stopped?  If these treatments are stopped, the focus of treatment will shift to comfort care. This involves measures to control pain and other symptoms you may have. These measures are not meant to cure your illness or help you live longer. Instead, they are meant to improve your quality of life during the time you have left. If you are in the end stage of your illness, you may be referred to hospice by your healthcare provider. Hospice provides end-of-life care. This includes emotional, spiritual, and social support for you and your loved ones.   How do I decide whether to stop life-sustaining treatments?  Your healthcare provider and healthcare team will talk with you about the specific treatments that are part of your care plan. If you want, you may include family and friends in these meetings. Here are some questions to think about or ask your healthcare team:   Is there is a chance that my illness will improve? Or will it continue to get worse?  What are my goals of care? Do I want to extend the time I have left? Or do I want to focus my care on comfort and managing symptoms?  How will stopping or continuing these treatments affect my health? Will they enhance my comfort and quality of life? Or will they cause more problems?  Consider your own values or kaylee. Also ask for advice from those who share your values.  How do I state my decisions about life-sustaining treatments?  Once you’ve made your decision to continue or stop specific treatments, you can tell your healthcare provider directly. It's best to  also put your treatment wishes in writing with advance directives. These are legal forms related to healthcare decisions. Laws about advance directives vary from state to state. Ask your healthcare provider about what forms are needed to make sure your wishes will be followed. Some common forms include:   A durable power of  for healthcare or a healthcare proxy form.  This form lets you name a person to make treatment decisions for you when you can’t. This person is often called a healthcare proxy, medical or healthcare power of , or agent.  A living will.  This form tells others the kinds of treatment you want or don’t want if you become too ill or injured to speak for yourself.  Orders for life-sustaining treatments.  These are actual healthcare provider's orders that must be followed by other medical providers. The form belongs to you, not to the healthcare provider or hospital.). These are legal forms obtained from your healthcare provider or hospital that document your wishes. The forms are known by different names depending on the state. Common names include:  MOLST (medical orders for life-sustaining treatment)  POLST (physician orders for life-sustaining treatment)  MOST (medical orders for scope of treatment)  POST (physician orders for scope of treatment)  TPOPP (transportable physician orders for patient preferences)     Keep in mind that you can change or cancel an advance directive at any time. Make it a practice to review your decisions each time there is a change in your health or goals of care. Also be sure to tell your healthcare proxy and loved ones of any changes in your decisions.   Deciding whether to stop life-sustaining treatments for a loved one   The decision to stop treatment ideally is made with the person’s consent. If the person is not capable of making decisions and has no advance directive, the decision falls to the person’s healthcare proxy or other adult. If you need  to make a decision about stopping treatment for a loved one, start by talking to his or her healthcare provider. Review the goals of care and the benefits and burdens of specific treatments on your loved one’s health. Also think about your loved one’s wishes and values. If needed, seek advice from other healthcare team members, like a  or .   [employer] last reviewed this educational content on 12/1/2019 © 2000-2021 [employer]. All rights reserved. This information is not intended as a substitute for professional medical care. Always follow your healthcare professional's instructions.

## 2025-07-09 ENCOUNTER — HOSPITAL ENCOUNTER (OUTPATIENT)
Dept: ENDOCRINOLOGY | Facility: HOSPITAL | Age: 85
Discharge: HOME | End: 2025-07-09
Attending: INTERNAL MEDICINE
Payer: MEDICARE

## 2025-07-09 DIAGNOSIS — E11.9 TYPE 2 DIABETES MELLITUS WITHOUT COMPLICATION, WITHOUT LONG-TERM CURRENT USE OF INSULIN (HCC): Primary | ICD-10-CM

## 2025-07-09 NOTE — PROGRESS NOTES
Israel Cornelius : 1940  attended individual initial assessment for Diabetes Education:    Date: 2025         Start time: 12:00 pm  End time: 1:00 pm     HEMOGLOBIN A1c (% of total Hgb)   Date Value   2023 8.2 (H)     HgbA1C (%)   Date Value   2025 7.9 (H)       Wt Readings from Last 1 Encounters:   25 183 lb 6.4 oz       Assessment: Met with patient for initial diabetes education. Patient was diagnosed with type 2 diabetes over 20 years ago.Denies family medical history related to diabetes. Monitors blood glucose levels randomly fasting. Current diabetes regimen consists of metformin bid  and glimepiride tid. Patient reports compliance with the current diabetes medications.Patient voiced concerns with eye sight complications. Patient is receptive to education and  agreeable to continue with the diabetes education sessions.     Diet Hx: 3 meals   Meal 1: time 8;30 am rye toast with coffee  Meal 2 : time 12 pm cheese and crackers with ham or gyro sandwich   Meal 3 : time 5-6 pm cereal or noodles tilapia      Education provided on the below topics:     Diabetes Overview:  Pathophysiology, A1C results and treatment options for diabetes self-management reviewed.   Described basic process and treatment options for diabetes self-management.  Introduced long term impact of uncontrolled blood glucose on health.    Healthy Eating:  Obtained usual diet history.  Instructed on Basic Diet Guidelines which includes eating 3 meals/day. Eat moderate amounts at consistent times from day to day. Limit/avoid sweets. Instructed on Balanced Plate Method of meal planning. Instructed to limit grains or starchy vegetables to ¼ of plate, protein to ¼ of plate, non-starchy vegetables to ½ plate and modest portions of fruit, yogurt, milk as desired.    Being Active:   Encouraged Physical Activity and briefly reviewed ADA recommended guidelines for activity with type 2 diabetes.    Taking Medications:    Reviewed current diabetes medications (if applicable).    Monitoring:  Instructed/reinforced blood glucose monitoring, testing schedules and target goals:   Fasting / Pre-meal  mg/dL 2 Hour Post-prandial <180 mg/dL  Demonstrated ability to perform blood glucose testing.     Problem Solving:   Prevention, detection and treatment of acute complications: taught symptoms of hypoglycemia, hyperglycemia, how to treat low blood sugar (Rule of 15) and actions for lowering high blood glucose levels.     Behavior Change:  Initiated individualized goal setting process and will continue to refine throughout class series.    Recommendations:      Monitor blood glucose as directed.  Follow Balance Plate method of meal planning.   Attend all Group Class in series         Written materials provided for all areas covered.  Patient verbalized understanding and all questions answered.    Erika Shepherd RN

## 2025-07-11 RX ORDER — LEVOTHYROXINE SODIUM 75 UG/1
75 TABLET ORAL
Qty: 90 TABLET | Refills: 1 | Status: SHIPPED | OUTPATIENT
Start: 2025-07-11

## 2025-07-14 ENCOUNTER — OFFICE VISIT (OUTPATIENT)
Facility: CLINIC | Age: 85
End: 2025-07-14
Payer: MEDICARE

## 2025-07-14 VITALS
HEART RATE: 88 BPM | HEIGHT: 68 IN | SYSTOLIC BLOOD PRESSURE: 122 MMHG | DIASTOLIC BLOOD PRESSURE: 80 MMHG | BODY MASS INDEX: 27.89 KG/M2 | WEIGHT: 184 LBS

## 2025-07-14 DIAGNOSIS — E61.1 IRON DEFICIENCY: Primary | ICD-10-CM

## 2025-07-14 PROCEDURE — 99203 OFFICE O/P NEW LOW 30 MIN: CPT | Performed by: INTERNAL MEDICINE

## 2025-07-14 PROCEDURE — 3074F SYST BP LT 130 MM HG: CPT | Performed by: INTERNAL MEDICINE

## 2025-07-14 PROCEDURE — 3079F DIAST BP 80-89 MM HG: CPT | Performed by: INTERNAL MEDICINE

## 2025-07-14 PROCEDURE — 1159F MED LIST DOCD IN RCRD: CPT | Performed by: INTERNAL MEDICINE

## 2025-07-14 PROCEDURE — 3008F BODY MASS INDEX DOCD: CPT | Performed by: INTERNAL MEDICINE

## 2025-07-14 NOTE — PROGRESS NOTES
Subjective:   Patient ID: Israel Cornelius is a 84 year old male.    HPI  The patient is seen today at the request of Dr. Griggs to discuss/management of iron deficiency.  History is obtained from the patient and from chart review.    The patient was admitted to the hospital in July 2011 with a hemoglobin of 6.2 and a ferritin of 2.  Transfusion was required.  A colonoscopy in September 2010 had been unremarkable.  An upper endoscopy in July 2011 revealed a large hiatal hernia that was felt to be causative.    The patient underwent an upper and lower endoscopy by Dr. Perkins in September 2013 for a recurrent iron deficiency anemia, personal history of adenomatous colon polyps and a family history of colon cancer in his sister.  An upper endoscopy revealed a large (8 cm) hiatal hernia with Alonso's erosions and an esophageal stricture.  The large hiatal hernia and Alonso's erosions were felt to be causative.    The patient last underwent a colonoscopy by Dr. Salvador Perkins in March 2020.  An ascending colon polyp was removed but not retrieved.  Endoscopic photographs reveal a clear view of the cecum and diverticulosis.  I do not see an obvious polyp on scanned images.  No further colonoscopic surveillance was advised.    Chart review reveals low ferritin levels dating back to May 2021 (6.3).  Values since that time have ranged from 14.2-33.1 with the current value of 20 on June 19, 2025.  Iron saturation was 11% in May 2021 with a range of 12-29 since that time with the current value of 29% in June.  Hemoglobin osorio was 12.7 in May 2021 and August 2023 with the most recent value of 13.4 in June.  MCV has remained consistently normal/high normal currently at 100.2.    The patient feels well.  He denies fatigue or weakness.  His appetite is good.  He denies nausea or vomiting.  He will note occasional difficulty swallowing \"if I do not chew well\".  He describes the sensation in the mid chest.  The ingested food  will spontaneously pass.  This occurs about once monthly.  He denies heartburn since discontinuing smoking 25 years prior.  Prior to this he \"lived on Rolaids\".  He denies abdominal pain.  His bowel movements are regular without recent change \"like clockwork at 0930).  He has been taking ferrous sulfate 3 times daily.  Stools are dark on the iron.  He has no blood in the stool.    Past medical history:  1.  Diabetes mellitus  2.  Chronic kidney disease  3.  Essential hypertension  4.  Cholecystectomy, appendectomy and herniorrhaphy    Medications:  As listed  Social history: The patient takes 81 mg of aspirin.  No NSAIDs.    Social history:  Quit smoking in 1998.    Family history:  Negative for gastrointestinal syndromes or cancers      History/Other:   Review of Systems  See above    Wt Readings from Last 7 Encounters:   07/14/25 184 lb (83.5 kg)   06/19/25 183 lb 6.4 oz (83.2 kg)   03/13/25 180 lb 6.4 oz (81.8 kg)   12/12/24 181 lb 3.2 oz (82.2 kg)   10/29/24 183 lb 11.2 oz (83.3 kg)   06/13/24 183 lb (83 kg)   03/04/24 187 lb (84.8 kg)       Current Medications[1]  Allergies:Allergies[2]    Objective:   Physical Exam  Vitals and nursing note reviewed.   Constitutional:       General: He is not in acute distress.     Appearance: He is well-developed. He is not ill-appearing, toxic-appearing or diaphoretic.      Comments: Looks younger than his stated age   HENT:      Mouth/Throat:      Pharynx: No oropharyngeal exudate.   Eyes:      General: No scleral icterus.     Conjunctiva/sclera: Conjunctivae normal.   Neck:      Thyroid: No thyromegaly.   Cardiovascular:      Rate and Rhythm: Normal rate and regular rhythm.      Heart sounds: Normal heart sounds. No murmur heard.  Pulmonary:      Effort: Pulmonary effort is normal. No respiratory distress.      Breath sounds: Normal breath sounds. No wheezing or rales.   Abdominal:      General: Bowel sounds are normal. There is no distension.      Palpations: Abdomen is  soft. There is no mass.      Tenderness: There is no abdominal tenderness. There is no guarding or rebound.   Musculoskeletal:      Cervical back: Neck supple.   Lymphadenopathy:      Cervical: No cervical adenopathy.   Neurological:      Mental Status: He is alert and oriented to person, place, and time.   Psychiatric:         Behavior: Behavior normal.         Component      Latest Ref Rng 11/24/2020 5/28/2021 8/30/2021 11/30/2021   WBC      4.0 - 11.0 x10(3) uL 7.0  6.8  6.9  7.6    RBC      3.80 - 5.80 x10(6)uL 4.40  4.64  4.62  4.84    Hemoglobin      13.0 - 17.5 g/dL 12.8 (L)  12.7 (L)  14.0  15.5    Hematocrit      39.0 - 53.0 % 40.7  42.0  44.1  47.3    MCV      80.0 - 100.0 fL 92.5  90.5  95.5  97.7    MCH      26.0 - 34.0 pg 29.1  27.4  30.3  32.0    MCHC      31.0 - 37.0 g/dL 31.4  30.2 (L)  31.7  32.8    RDW-SD      35.1 - 46.3 fL 47.8 (H)  52.6 (H)  48.9 (H)  48.5 (H)    RDW      11.0 - 15.0 % 14.2  15.9 (H)  13.8  13.2    Platelet Count      150.0 - 450.0 10(3)uL 344.0  357.0  302.0  350.0    Prelim Neutrophil Abs      1.50 - 7.70 x10 (3) uL 3.91  4.40  4.44  4.79    Neutrophils Absolute      1.50 - 7.70 x10(3) uL 3.91  4.40  4.44  4.79    Lymphocytes Absolute      1.00 - 4.00 x10(3) uL 1.63  1.37  1.38  1.47    Monocytes Absolute      0.10 - 1.00 x10(3) uL 0.60  0.62  0.55  0.64    Eosinophils Absolute      0.00 - 0.70 x10(3) uL 0.83 (H)  0.36  0.44  0.57    Basophils Absolute      0.00 - 0.20 x10(3) uL 0.06  0.06  0.06  0.07    Immature Granulocyte Absolute      0.00 - 1.00 x10(3) uL 0.01  0.02  0.01  0.02    Neutrophils %      % 55.5  64.3  64.5  63.4    Lymphocytes %      % 23.2  20.1  20.1  19.4    Monocytes %      % 8.5  9.1  8.0  8.5    Eosinophils %      % 11.8  5.3  6.4  7.5    Basophils %      % 0.9  0.9  0.9  0.9    Immature Granulocyte %      % 0.1  0.3  0.1  0.3      Component      Latest Ref Rn 12/20/2021 6/24/2022 1/12/2023 8/16/2023   WBC      4.0 - 11.0 x10(3) uL 6.6  4.9 (E) 6.4  6.3     RBC      3.80 - 5.80 x10(6)uL 4.74   4.33  4.09    Hemoglobin      13.0 - 17.5 g/dL 14.9  13.3 (E) 13.7  12.7 (L)    Hematocrit      39.0 - 53.0 % 46.5  40.5 (E) 40.4  39.3    MCV      80.0 - 100.0 fL 98.1  94.8 (E) 93.3  96.1    MCH      26.0 - 34.0 pg 31.4   31.6  31.1    MCHC      31.0 - 37.0 g/dL 32.0   33.9  32.3    RDW-SD      35.1 - 46.3 fL 47.4 (H)       RDW      11.0 - 15.0 % 13.2   12.2  12.3    Platelet Count      150.0 - 450.0 10(3)uL 322.0  268 (E) 348  325.0    Prelim Neutrophil Abs      1.50 - 7.70 x10 (3) uL 4.21    3.89    Neutrophils Absolute      1.50 - 7.70 x10(3) uL 4.21   3,686  3.89    Lymphocytes Absolute      1.00 - 4.00 x10(3) uL 1.27   1,402  1.28    Monocytes Absolute      0.10 - 1.00 x10(3) uL 0.54   544  0.65    Eosinophils Absolute      0.00 - 0.70 x10(3) uL 0.50   717 (H)  0.41    Basophils Absolute      0.00 - 0.20 x10(3) uL 0.05   51  0.04    Immature Granulocyte Absolute      0.00 - 1.00 x10(3) uL 0.01    0.01    Neutrophils %      % 63.9   57.6  61.9    Lymphocytes %      % 19.3   21.9  20.4    Monocytes %      % 8.2   8.5  10.4    Eosinophils %      % 7.6   11.2  6.5    Basophils %      % 0.8   0.8  0.6    Immature Granulocyte %      % 0.2    0.2      Component      Latest Ref Rng 12/14/2023 2/25/2024 6/3/2024 10/21/2024   WBC      4.0 - 11.0 x10(3) uL 7.1  5.4  5.8  5.6    RBC      3.80 - 5.80 x10(6)uL 4.32  4.04  4.40  4.41    Hemoglobin      13.0 - 17.5 g/dL 13.6  13.3  14.6  14.2    Hematocrit      39.0 - 53.0 % 41.7  38.8 (L)  42.6  42.9    MCV      80.0 - 100.0 fL 96.5  96.0  96.8  97.3    MCH      26.0 - 34.0 pg 31.5  32.9  33.2  32.2    MCHC      31.0 - 37.0 g/dL 32.6  34.3  34.3  33.1    RDW-SD      35.1 - 46.3 fL 50.9 (H)  46.5 (H)  44.0  45.0    RDW      11.0 - 15.0 % 14.3  13.1  12.3  12.6    Platelet Count      150.0 - 450.0 10(3)uL 296.0  272.0  279.0  276.0    Prelim Neutrophil Abs      1.50 - 7.70 x10 (3) uL 3.98  3.20  3.05  3.11    Neutrophils Absolute       1.50 - 7.70 x10(3) uL 3.98  3.20  3.05  3.11    Lymphocytes Absolute      1.00 - 4.00 x10(3) uL 1.52  1.27  1.63  1.51    Monocytes Absolute      0.10 - 1.00 x10(3) uL 0.65  0.44  0.49  0.49    Eosinophils Absolute      0.00 - 0.70 x10(3) uL 0.84 (H)  0.44  0.56  0.46    Basophils Absolute      0.00 - 0.20 x10(3) uL 0.07  0.04  0.05  0.05    Immature Granulocyte Absolute      0.00 - 1.00 x10(3) uL 0.01  0.01  0.01  0.02    Neutrophils %      % 56.3  59.4  52.5  55.0    Lymphocytes %      % 21.5  23.5  28.2  26.8    Monocytes %      % 9.2  8.1  8.5  8.7    Eosinophils %      % 11.9  8.1  9.7  8.2    Basophils %      % 1.0  0.7  0.9  0.9    Immature Granulocyte %      % 0.1  0.2  0.2  0.4      Component      Latest Ref Rng 2/12/2025 6/19/2025   WBC      4.0 - 11.0 x10(3) uL 6.6  5.1    RBC      3.80 - 5.80 x10(6)uL 4.45  4.20    Hemoglobin      13.0 - 17.5 g/dL 14.0  13.4    Hematocrit      39.0 - 53.0 % 43.0  42.1    MCV      80.0 - 100.0 fL 96.6  100.2 (H)    MCH      26.0 - 34.0 pg 31.5  31.9    MCHC      31.0 - 37.0 g/dL 32.6  31.8    RDW-SD      35.1 - 46.3 fL 45.5  49.3 (H)    RDW      11.0 - 15.0 % 12.9  13.3    Platelet Count      150.0 - 450.0 10(3)uL 307.0  289.0    Prelim Neutrophil Abs      1.50 - 7.70 x10 (3) uL 3.81  3.06    Neutrophils Absolute      1.50 - 7.70 x10(3) uL 3.81  3.06    Lymphocytes Absolute      1.00 - 4.00 x10(3) uL 1.40  1.17    Monocytes Absolute      0.10 - 1.00 x10(3) uL 0.46  0.46    Eosinophils Absolute      0.00 - 0.70 x10(3) uL 0.84 (H)  0.37    Basophils Absolute      0.00 - 0.20 x10(3) uL 0.06  0.04    Immature Granulocyte Absolute      0.00 - 1.00 x10(3) uL 0.01  0.01    Neutrophils %      % 57.8  59.9    Lymphocytes %      % 21.3  22.9    Monocytes %      % 7.0  9.0    Eosinophils %      % 12.8  7.2    Basophils %      % 0.9  0.8    Immature Granulocyte %      % 0.2  0.2       Legend:  (L) Low  (H) High  (E) External lab result    Component      Latest Ref Rng 5/28/2021  8/30/2021 11/30/2021 8/16/2023 12/14/2023   Iron, Serum      65 - 175 ug/dL 51 (L)  80  121  55 (L)  58 (L)    Transferrin      215 - 365 mg/dL 317  285  324  315  288    Iron Bind.Cap.(TIBC)      250 - 425 ug/dL 472 (H)  425  483 (H)  469 (H)  429 (H)    Iron Saturation      20 - 50 % 11 (L)  19 (L)  25  12 (L)  14 (L)      Component      Latest Ref Rng 6/3/2024 10/21/2024 2/12/2025 6/19/2025   Iron, Serum      65 - 175 ug/dL 91  78  91  110    Transferrin      215 - 365 mg/dL 269  278  298  290    Iron Bind.Cap.(TIBC)      250 - 425 ug/dL 401  414  366  379    Iron Saturation      20 - 50 % 23  19 (L)  25  29       Legend:  (L) Low  (H) High    Component      Latest Ref Rng 5/28/2021 8/30/2021 11/30/2021 1/12/2023 8/16/2023 12/14/2023   FERRITIN      50 - 336 ng/mL 6.3 (L)  14.2 (L)  33.1  12 (L)  8.5 (L)  15.1 (L)      Component      Latest Ref Rng 6/3/2024 10/21/2024 2/12/2025 6/19/2025   FERRITIN      50 - 336 ng/mL 19.1 (L)  26 (L)  19 (L)  20 (L)       Legend:  (L) Low      Assessment & Plan:   1. Iron deficiency    The patient has a history of an iron deficiency/anemia dating back to 2011.  He is currently not anemic and has a stable low ferritin on ferrous sulfate 3 times daily.  Previous endoscopic evaluation has revealed a large hiatal hernia with Alonso's erosions.  I suspect that the patient's anemia is due to the large hiatal hernia and Alonso's erosions.  I doubt other serious structural lesions.  I am recommending that the patient continue the ferrous sulfate.  He may decrease the medication to once daily as #3 times daily dosing is not more efficacious and may actually lessen iron absorption.  I would recommend that we repeat blood work in the next several weeks as directed by Dr. Anson.  I would reserve endoscopic evaluation for a progressive blood loss anemia or any symptoms or signs referable to the GI tract.  The patient is supportive of this approach and its limitations.      Meds This  Visit:  Requested Prescriptions      No prescriptions requested or ordered in this encounter       Imaging & Referrals:  None           [1]   Current Outpatient Medications   Medication Sig Dispense Refill    levothyroxine 75 MCG Oral Tab Take 1 tablet (75 mcg total) by mouth before breakfast. 90 tablet 1    metFORMIN HCl 1000 MG Oral Tab Take 1 tablet (1,000 mg total) by mouth 2 (two) times daily. 180 tablet 3    glimepiride 1 MG Oral Tab Take 1 tablet (1 mg total) by mouth 3 (three) times daily. 270 tablet 3    lisinopril 5 MG Oral Tab Take 1 tablet (5 mg total) by mouth daily. 90 tablet 3    rosuvastatin 20 MG Oral Tab Take 1 tablet (20 mg total) by mouth nightly. 90 tablet 3    ferrous sulfate 325 (65 FE) MG Oral Tab EC Take 1 tablet (325 mg total) by mouth daily with breakfast.      TRUEplus Lancets 33G Does not apply Misc TEST BLOOD SUGAR ONE TIME DAILY IN THE MORNING 100 each 11    Alcohol Swabs 70 % Does not apply Pads Clean area prior to checking blood glucose daily. 100 each 0    Blood Glucose Monitoring Suppl (TRUE METRIX METER) w/Device Does not apply Kit       Blood Gluc Meter Disp-Strips Does not apply Device Code: E11.9. Checks qam. 50 each 5    NON FORMULARY FISH OIL. Patient doen't know how many mg. He takes it once  a day.      Glucose Blood (ONETOUCH ULTRA) In Vitro Strip Dx. E11.9. Checks qam. 100 each 11    aspirin 81 MG Oral Tab Take 1 tablet (81 mg total) by mouth daily.      Calcium Carbonate-Vitamin D 600-200 MG-UNIT Oral Tab Take by mouth.      Multiple Vitamins-Minerals (MULTIVITAMIN OR) Take by mouth.     [2]   Allergies  Allergen Reactions    Shellfish-Derived Products UNKNOWN

## 2025-07-21 PROBLEM — J41.0 SMOKERS' COUGH (HCC): Chronic | Status: RESOLVED | Noted: 2024-03-04 | Resolved: 2025-07-21

## 2025-08-13 RX ORDER — ROSUVASTATIN CALCIUM 20 MG/1
20 TABLET, COATED ORAL NIGHTLY
Qty: 90 TABLET | Refills: 3 | Status: SHIPPED | OUTPATIENT
Start: 2025-08-13

## (undated) DIAGNOSIS — E11.9 TYPE 2 DIABETES MELLITUS WITHOUT COMPLICATION, WITHOUT LONG-TERM CURRENT USE OF INSULIN (HCC): Primary | ICD-10-CM

## (undated) DIAGNOSIS — E83.52 HYPERCALCEMIA: Primary | ICD-10-CM

## (undated) DEVICE — SOL H2O 1000ML BTL

## (undated) DEVICE — Device

## (undated) DEVICE — MEDI-VAC NON-CONDUCTIVE SUCTION TUBING: Brand: CARDINAL HEALTH

## (undated) DEVICE — SOL  .9 1000ML BTL

## (undated) DEVICE — OIL CARTRIDGE: Brand: CORE, MAESTRO

## (undated) DEVICE — VIOLET BRAIDED (POLYGLACTIN 910), SYNTHETIC ABSORBABLE SUTURE: Brand: COATED VICRYL

## (undated) DEVICE — LAMINECTOMY: Brand: MEDLINE INDUSTRIES, INC.

## (undated) DEVICE — DUAL LUMEN CATHETER

## (undated) DEVICE — DRAPE SRG 150X54IN LEICA

## (undated) DEVICE — SUTURE VICRYL 2-0 FS-1

## (undated) DEVICE — CYSTO PACK: Brand: MEDLINE INDUSTRIES, INC.

## (undated) DEVICE — SOLO FLEX HYBRID GUIDEWIRE .03

## (undated) DEVICE — STERILE POLYISOPRENE POWDER-FREE SURGICAL GLOVES: Brand: PROTEXIS

## (undated) DEVICE — ISOVUE 300 10X100ML VIAL

## (undated) DEVICE — SOLO HYBRID WIRE 35 FLEX STR

## (undated) DEVICE — CATH URET CONE TIP 8FR 138008

## (undated) DEVICE — STERILE SURGICAL LUBRICANT, METAL TUBE: Brand: SURGILUBE

## (undated) DEVICE — STERILE LATEX POWDER-FREE SURGICAL GLOVESWITH NITRILE COATING: Brand: PROTEXIS

## (undated) DEVICE — C-ARMOR C-ARM EQUIPMENT COVERS CLEAR STERILE UNIVERSAL FIT 12 PER CASE: Brand: C-ARMOR

## (undated) DEVICE — GAUZE SPONGES,12 PLY: Brand: CURITY

## (undated) DEVICE — SUTURE MONOCRYL 4-0 Y845G

## (undated) DEVICE — SPONGE LAP 18X18 XRAY STRL

## (undated) DEVICE — SOL  .9 3000ML

## (undated) DEVICE — 3M(TM) TEGADERM(TM) TRANSPARENT FILM DRESSING FRAME STYLE 1628: Brand: 3M™ TEGADERM™

## (undated) DEVICE — CUSHION INSERT PRONEVIEW LG

## (undated) DEVICE — MIS PRECISION NEURO (MATCH HEAD)

## (undated) DEVICE — DRAPE C-ARM UNIVERSAL

## (undated) DEVICE — SUCTION CANISTER, 3000CC,SAFELINER: Brand: DEROYAL

## (undated) DEVICE — 3M™ STERI-STRIP™ COMPOUND BENZOIN TINCTURE 40 BAGS/CARTON 4 CARTONS/CASE C1544: Brand: 3M™ STERI-STRIP™

## (undated) DEVICE — SPONGE LAP 4X18

## (undated) DEVICE — DIFFUSER: Brand: CORE, MAESTRO

## (undated) DEVICE — OCCLUSIVE GAUZE STRIP OVERWRAP,3% BISMUTH TRIBROMOPHENATE IN PETROLATUM BLEND: Brand: XEROFORM

## (undated) DEVICE — STERILE LATEX POWDER-FREE SURGICAL GLOVES WITH HYDROGEL COATING, SMOOTH FINISH, STRAIGHT FINGER: Brand: PROTEXIS

## (undated) DEVICE — PUMP SAPS 1  ACT 1 WY VLV

## (undated) DEVICE — FLEXIBLE YANKAUER,MEDIUM TIP, NO VACUUM CONTROL: Brand: ARGYLE

## (undated) DEVICE — UROLOGY DRAIN BAG

## (undated) DEVICE — ENDOSCOPIC VALVE WITH ADAPTER.: Brand: SURSEAL® II

## (undated) DEVICE — 11.1-M5 MULTIMODALITY KIT 5

## (undated) DEVICE — TIGERTAIL 6F FLXTIP 70CM

## (undated) NOTE — Clinical Note
TCM RN outreach complete. No TCM apt slots open. TE routed to clinical staff for assistance.  Qualifies until 11/8/18

## (undated) NOTE — LETTER
AUTHORIZATION FOR SURGICAL OPERATION OR OTHER PROCEDURE    1.  I hereby authorize Dr. Iker Sung  and [facility] staff assigned to my case to perform the following operation and/or procedure at the [facility]:    Cortisone injection in [place] Time:  ________ A. M.  P.M.        Patient Name:  ______________________________________________________  (please print)      Patient signature:  ___________________________________________________             Relationship to Patient:           []  Parent

## (undated) NOTE — MR AVS SNAPSHOT
[address] [facility] [address]  [phone]               Thank you for choosing us for your health care visit with Denny Mckeon MD.  We are glad to serve you and happy to provide you with this summary of your Will check his A1C today         Allergies as of Mar 08, 2017     Iodine-131     Other reaction(s): Unknown    Shellfish Swelling    Other reaction(s): SHELLFISH DERIVED    Shellfish Allergy Unknown                Today's Vital Signs     BP Pulse Temp Heig 1. Patient to consistently perform her HEP and it's progression to maintain her improved condition. 2. Patient to have reduced, centralized, and abolished symptoms in the low back to enable easier ADLs, functional, work, and recreational activities.    3 Water is best for hydration Fast food. Eat at home when possible     Tips for increasing your physical activity – Adults who are physically active are less likely to develop some chronic diseases than adults who are inactive.      HOW TO GET STARTED: HOW

## (undated) NOTE — MR AVS SNAPSHOT
[address] [facility] [address]  [phone]               Thank you for choosing us for your health care visit with Lavon Castro MD.  We are glad to serve you and happy to provide you with this summary of your This list is accurate as of: 3/13/17 11:59 PM.  Always use your most recent med list.                ASPIR-81 81 MG Tbec   Generic drug:  aspirin   Take 81 mg by mouth every morning.  take 1 tablet (81MG)  by ORAL route  every day           Atorvastatin Jay URINALYSIS, ROUTINE      Component Value Standard Range & Units    Urine Color Yellow Yellow    Clarity Urine Clear Clear    Spec Gravity 1.023 1.002-1.035    pH Urine 5.0 5.0-8.0    Protein Urine Negative Negative mg/dL    Glucose Urine Trace Negative mg Summaries. If you've been to the Emergency Department or your doctor's office, you can view your past visit information in [facility] by going to Visits < Visit Summaries. [facility] questions? Call [phone] for help.   [facility] is NOT to be used for urge

## (undated) NOTE — LETTER
6/23/2023              Madras Deis        [address] [facility] [id number]         Dear Rebecca Doan,    This letter is to inform you that our office has made several attempts to reach you by phone without success. We were attempting to contact you by phone regarding - Test results    Please contact our office at the number listed below as soon as you receive this letter to discuss this issue and to make the necessary changes in our system to your contact information. Thank you for your cooperation. Sincerely,    Tami Jolly. Rosalina Caruso, [address] [place] [address] Drive [address] [phone]        Document electronically generated by:   Radha Loyola RN

## (undated) NOTE — ED AVS SNAPSHOT
Ping Castro   MRN: [id number]    Department:  [facility] Emergency Department   Date of Visit:  1/27/2018           Disclosure     Insurance plans vary and the physician(s) referred by the ER may not be covered by your plan.  Please conta within the next three months to obtain basic health screening including reassessment of your blood pressure.     IF THERE IS ANY CHANGE OR WORSENING OF YOUR CONDITION, CALL YOUR PRIMARY CARE PHYSICIAN AT ONCE OR RETURN IMMEDIATELY TO THE EMERGENCY DEPARTMEN

## (undated) NOTE — ED AVS SNAPSHOT
Benjy Shepard   MRN: [id number]    Department:  [facility] Emergency Department   Date of Visit:  5/26/2018           Disclosure     Insurance plans vary and the physician(s) referred by the ER may not be covered by your plan.  Please conta within the next three months to obtain basic health screening including reassessment of your blood pressure.     IF THERE IS ANY CHANGE OR WORSENING OF YOUR CONDITION, CALL YOUR PRIMARY CARE PHYSICIAN AT ONCE OR RETURN IMMEDIATELY TO THE EMERGENCY DEPARTMEN

## (undated) NOTE — ED AVS SNAPSHOT
Colton Castillo   MRN: [id number]    Department:  [facility] Emergency Department   Date of Visit:  10/25/2019           Disclosure     Insurance plans vary and the physician(s) referred by the ER may not be covered by your plan.  Please cont within the next three months to obtain basic health screening including reassessment of your blood pressure.     IF THERE IS ANY CHANGE OR WORSENING OF YOUR CONDITION, CALL YOUR PRIMARY CARE PHYSICIAN AT ONCE OR RETURN IMMEDIATELY TO THE EMERGENCY DEPARTMEN